# Patient Record
Sex: MALE | Race: WHITE | NOT HISPANIC OR LATINO
[De-identification: names, ages, dates, MRNs, and addresses within clinical notes are randomized per-mention and may not be internally consistent; named-entity substitution may affect disease eponyms.]

---

## 2018-05-17 ENCOUNTER — APPOINTMENT (OUTPATIENT)
Dept: ORTHOPEDIC SURGERY | Facility: CLINIC | Age: 53
End: 2018-05-17
Payer: COMMERCIAL

## 2018-05-17 VITALS
DIASTOLIC BLOOD PRESSURE: 96 MMHG | HEIGHT: 74 IN | HEART RATE: 87 BPM | SYSTOLIC BLOOD PRESSURE: 160 MMHG | WEIGHT: 310 LBS | BODY MASS INDEX: 39.78 KG/M2

## 2018-05-17 PROCEDURE — 99204 OFFICE O/P NEW MOD 45 MIN: CPT | Mod: 25

## 2018-05-17 PROCEDURE — 20552 NJX 1/MLT TRIGGER POINT 1/2: CPT

## 2018-05-17 PROCEDURE — 72100 X-RAY EXAM L-S SPINE 2/3 VWS: CPT

## 2018-05-17 RX ORDER — EPINEPHRINE 0.3 MG/.3ML
0.3 INJECTION INTRAMUSCULAR
Qty: 2 | Refills: 0 | Status: ACTIVE | COMMUNITY
Start: 2018-01-19

## 2018-06-07 ENCOUNTER — APPOINTMENT (OUTPATIENT)
Dept: ORTHOPEDIC SURGERY | Facility: CLINIC | Age: 53
End: 2018-06-07
Payer: COMMERCIAL

## 2018-06-07 VITALS
HEART RATE: 84 BPM | WEIGHT: 310 LBS | SYSTOLIC BLOOD PRESSURE: 149 MMHG | BODY MASS INDEX: 39.78 KG/M2 | DIASTOLIC BLOOD PRESSURE: 89 MMHG | HEIGHT: 74 IN

## 2018-06-07 PROCEDURE — 99214 OFFICE O/P EST MOD 30 MIN: CPT

## 2018-08-30 ENCOUNTER — OTHER (OUTPATIENT)
Age: 53
End: 2018-08-30

## 2018-09-18 ENCOUNTER — APPOINTMENT (OUTPATIENT)
Dept: ORTHOPEDIC SURGERY | Facility: CLINIC | Age: 53
End: 2018-09-18
Payer: COMMERCIAL

## 2018-09-18 VITALS
DIASTOLIC BLOOD PRESSURE: 90 MMHG | BODY MASS INDEX: 39.78 KG/M2 | WEIGHT: 310 LBS | SYSTOLIC BLOOD PRESSURE: 146 MMHG | HEIGHT: 74 IN | HEART RATE: 84 BPM

## 2018-09-18 DIAGNOSIS — Z78.9 OTHER SPECIFIED HEALTH STATUS: ICD-10-CM

## 2018-09-18 PROCEDURE — 96372 THER/PROPH/DIAG INJ SC/IM: CPT

## 2018-09-18 PROCEDURE — 99214 OFFICE O/P EST MOD 30 MIN: CPT | Mod: 25

## 2018-10-30 ENCOUNTER — APPOINTMENT (OUTPATIENT)
Dept: ORTHOPEDIC SURGERY | Facility: CLINIC | Age: 53
End: 2018-10-30
Payer: COMMERCIAL

## 2018-10-30 VITALS
HEIGHT: 74 IN | DIASTOLIC BLOOD PRESSURE: 93 MMHG | WEIGHT: 310 LBS | SYSTOLIC BLOOD PRESSURE: 163 MMHG | HEART RATE: 83 BPM | BODY MASS INDEX: 39.78 KG/M2

## 2018-10-30 PROCEDURE — 99214 OFFICE O/P EST MOD 30 MIN: CPT

## 2018-12-04 ENCOUNTER — OTHER (OUTPATIENT)
Age: 53
End: 2018-12-04

## 2019-01-04 ENCOUNTER — APPOINTMENT (OUTPATIENT)
Dept: ORTHOPEDIC SURGERY | Facility: CLINIC | Age: 54
End: 2019-01-04
Payer: COMMERCIAL

## 2019-01-04 VITALS
HEIGHT: 74 IN | HEART RATE: 79 BPM | DIASTOLIC BLOOD PRESSURE: 86 MMHG | SYSTOLIC BLOOD PRESSURE: 136 MMHG | WEIGHT: 310 LBS | BODY MASS INDEX: 39.78 KG/M2

## 2019-01-04 DIAGNOSIS — E66.9 OBESITY, UNSPECIFIED: ICD-10-CM

## 2019-01-04 DIAGNOSIS — M47.816 SPONDYLOSIS W/OUT MYELOPATHY OR RADICULOPATHY, LUMBAR REGION: ICD-10-CM

## 2019-01-04 DIAGNOSIS — Z86.79 PERSONAL HISTORY OF OTHER DISEASES OF THE CIRCULATORY SYSTEM: ICD-10-CM

## 2019-01-04 DIAGNOSIS — M60.9 MYOSITIS, UNSPECIFIED: ICD-10-CM

## 2019-01-04 PROCEDURE — 99214 OFFICE O/P EST MOD 30 MIN: CPT

## 2019-01-26 ENCOUNTER — EMERGENCY (EMERGENCY)
Facility: HOSPITAL | Age: 54
LOS: 1 days | Discharge: HOME | End: 2019-01-26
Attending: EMERGENCY MEDICINE
Payer: COMMERCIAL

## 2019-01-26 VITALS
RESPIRATION RATE: 18 BRPM | HEART RATE: 96 BPM | OXYGEN SATURATION: 99 % | SYSTOLIC BLOOD PRESSURE: 131 MMHG | DIASTOLIC BLOOD PRESSURE: 82 MMHG

## 2019-01-26 VITALS
RESPIRATION RATE: 22 BRPM | OXYGEN SATURATION: 99 % | TEMPERATURE: 98 F | SYSTOLIC BLOOD PRESSURE: 162 MMHG | DIASTOLIC BLOOD PRESSURE: 80 MMHG | HEART RATE: 114 BPM

## 2019-01-26 PROCEDURE — 99284 EMERGENCY DEPT VISIT MOD MDM: CPT

## 2019-01-26 PROCEDURE — 71045 X-RAY EXAM CHEST 1 VIEW: CPT

## 2019-01-26 PROCEDURE — 99285 EMERGENCY DEPT VISIT HI MDM: CPT

## 2019-01-26 PROCEDURE — 71045 X-RAY EXAM CHEST 1 VIEW: CPT | Mod: 26

## 2019-01-26 RX ORDER — SODIUM CHLORIDE 9 MG/ML
1000 INJECTION INTRAMUSCULAR; INTRAVENOUS; SUBCUTANEOUS ONCE
Qty: 0 | Refills: 0 | Status: COMPLETED | OUTPATIENT
Start: 2019-01-26 | End: 2019-01-26

## 2019-01-26 NOTE — ED PROVIDER NOTE - SKIN, MLM
Skin normal color for race, warm, dry and intact. No evidence of rash. NO scalp hematomas or external signs of trauma.

## 2019-01-26 NOTE — ED ADULT NURSE REASSESSMENT NOTE - NS ED NURSE REASSESS COMMENT FT1
received in room pt awake but sleepy denies complaints states wants to go home awaiting wife to arrive, pt informed of need to monitor due to receiving narcan verbalizes understanding. pt agrees to stay for observation, refusing blood and urine cardiac monitor in place 02 sat 97% on room air

## 2019-01-26 NOTE — ED ADULT NURSE REASSESSMENT NOTE - NS ED NURSE REASSESS COMMENT FT1
pt awake,  a and ox4 wants to leave, pts wife at bedside ok to take pt home. dr alonso aware. iv dced at pt request awaiting dc

## 2019-01-26 NOTE — ED PROVIDER NOTE - MEDICAL DECISION MAKING DETAILS
Pt presents after found unresponsive for EMS; pt reports drinking heavily x several days;  currently refusing blood work/imaging/; is alert and oriented; VSS:  will monitor.

## 2019-01-26 NOTE — ED ADULT NURSE NOTE - OBJECTIVE STATEMENT
assumed pt care at 1900 as critical care RN per ER protocol procedure.  Per EMS, pt was found unresponsive and cyanotic lying on the ground next to his car in a bar parking lot.  EMS gave 0.4 IV narcan.  Pt arrives awake, alert and oriented X 3.  Fall asleep inbetween answering questions.  Grunting at times and states "Im cold thast why im grunting."  Oxygen saturation 98%.  Dr. venegas at bedside.  Pt admits to drinking alcohol for the past two days.  He refused lab draw.  Dr. Venegas discussed importance of bloodwork. Pt takes oxycode at home for gout symptoms. No signs of trauma present. He denies pain.

## 2019-01-26 NOTE — ED ADULT TRIAGE NOTE - CHIEF COMPLAINT QUOTE
Patient BIBA, as per EMS patient found unconscious outside a bar, as per EMS patient was cyanotic, bagged and given 0.4mg narcan. patient barely responsive at this time, sent to critical care upon ED arrival

## 2019-01-26 NOTE — ED PROVIDER NOTE - PROGRESS NOTE DETAILS
PT alert/ oriented x 3; continues to refuse blood work and imaging. Pt sitting up on edge of bed with wife, awake and alert; I spoke to them together, with patient's permission to review events of today and concerns of EMS and US. I alerted pt that he could have electrolyte abnormalities/ dehydration due to heavy drinking x 3 days ( which he continues to endorse), and he states he will be sure to hydrate. Wife, who is a nurse, says she will watch him as well. Pt advised to return to ER immediately if he does not feel well or develops any concerning symptoms as our workup and intervention was minimal and he understands. Repeat VS stable. PT well appearing. No over current signs of intoxication. Will discharge home with wife. Pt sitting up on edge of bed with wife, awake and alert; I spoke to them together, with patient's permission to review events of today and concerns of EMS and US. I alerted pt that he could have electrolyte abnormalities/ dehydration due to heavy drinking x 3 days ( which he continues to endorse), and he states he will be sure to hydrate. Pt , again, admits to going on a drinking binge for the past three days due to the passing of his friend's wife and states he knows he drank too much.  Wife, who is a nurse, says she will watch him at home as well. Pt advised to return to ER immediately if he does not feel well or develops any concerning symptoms as our workup and intervention was minimal and he understands. Repeat VS stable. PT well appearing. No over current signs of intoxication. Will discharge home with wife.

## 2019-01-26 NOTE — ED PROVIDER NOTE - OBJECTIVE STATEMENT
53yoM found unresponsive on the ground outside a bar;  pt states he drank a lot; per EMS they arrived and pt was minimally responsive; they started bagging him and state that he had pinpoint pupils but started to come- to when they administered 0m4 mg Narcan;  pt admits to drinking; states that his friend's wife just passed away and he has been at a wake for several days;   in the field. Pt denies smoking/ drug use. Pt refusing blood work/ imaging stating he feels fine and doesn't need it. He states this only happened because he was drinking.     PCP Isaac. 53yoM found unresponsive on the ground outside a bar;  pt states he drank a lot; per EMS they arrived and pt was minimally responsive; they started bagging him and state that he had pinpoint pupils but started to come- to when they administered 0m4 mg Narcan;  pt admits to drinking; states that his friend's wife just passed away and he has been at a wake and drinking for several days straight;   in the field. Pt denies smoking/ drug use. Pt refusing blood work/ imaging stating he feels fine and doesn't need it. He states this only happened because he was drinking.     PCP Isaac. 53yoM found unresponsive on the ground outside a bar;  pt states he drank a lot; per EMS they arrived and pt was minimally responsive; they started bagging him and state that he had pinpoint pupils but started to come- to when they administered 0.4 mg Narcan;  pt admits to drinking; states that his friend's wife just passed away and he has been at a wake and drinking for several days straight;   in the field. Pt denies smoking/ drug use. Pt refusing blood work/ imaging stating he feels fine and doesn't need it. He states this only happened because he was drinking.     PCP Isaac.

## 2019-02-12 ENCOUNTER — OTHER (OUTPATIENT)
Age: 54
End: 2019-02-12

## 2021-03-09 PROBLEM — I10 ESSENTIAL (PRIMARY) HYPERTENSION: Chronic | Status: ACTIVE | Noted: 2019-01-26

## 2021-03-09 PROBLEM — M10.9 GOUT, UNSPECIFIED: Chronic | Status: ACTIVE | Noted: 2019-01-26

## 2021-03-15 ENCOUNTER — APPOINTMENT (OUTPATIENT)
Dept: SURGERY | Facility: CLINIC | Age: 56
End: 2021-03-15
Payer: MEDICARE

## 2021-03-15 VITALS
OXYGEN SATURATION: 95 % | SYSTOLIC BLOOD PRESSURE: 150 MMHG | WEIGHT: 315 LBS | BODY MASS INDEX: 42.2 KG/M2 | HEART RATE: 87 BPM | TEMPERATURE: 97.3 F | DIASTOLIC BLOOD PRESSURE: 87 MMHG | HEIGHT: 72.5 IN

## 2021-03-15 DIAGNOSIS — M54.9 DORSALGIA, UNSPECIFIED: ICD-10-CM

## 2021-03-15 DIAGNOSIS — E78.00 PURE HYPERCHOLESTEROLEMIA, UNSPECIFIED: ICD-10-CM

## 2021-03-15 DIAGNOSIS — I10 ESSENTIAL (PRIMARY) HYPERTENSION: ICD-10-CM

## 2021-03-15 DIAGNOSIS — M10.9 GOUT, UNSPECIFIED: ICD-10-CM

## 2021-03-15 PROCEDURE — 99204 OFFICE O/P NEW MOD 45 MIN: CPT

## 2021-03-15 RX ORDER — UBIDECARENONE/VIT E ACET 100MG-5
CAPSULE ORAL
Refills: 0 | Status: ACTIVE | COMMUNITY

## 2021-03-15 RX ORDER — MULTIVIT-MIN/IRON/FOLIC ACID/K 18-600-40
CAPSULE ORAL
Refills: 0 | Status: ACTIVE | COMMUNITY

## 2021-03-15 NOTE — HISTORY OF PRESENT ILLNESS
[de-identified] : LEONIDAS DALE is a 55 year old morbidly obese male (BMI=48), with history of prediabetes, AISHA on CPAP, high blood pressure, high cholesterol presents today to discuss his weight loss options. He states that he has tried numerous times to lose weight through more traditional medical means (diets including Slimfast, liquid protein, low calorie diet, Atkins, [place] & physician supervised diets) with limited success. He states that he loses approx. 15-20 lbs each attempt before plateauing and losing interest. He is looking for a more durable weight loss solution. He is interested in bariatric surgery in the hopes of eliminating these comorbid conditions and living a longer healthier life.  \par Denies tobacco use \par Denies GERD\par Family history of gastric cancer in multiple relatives

## 2021-03-15 NOTE — ASSESSMENT
[FreeTextEntry1] : 55M h/o HTN, HLD, AISHA, morbid obesity with BMI 48, here for initial visit for possible bariatric surgery. Based on current weight, comorbidities, and weight loss goals, would be excellent candidate for sleeve gastrectomy.

## 2021-03-15 NOTE — REVIEW OF SYSTEMS
[Joint Pain] : joint pain [Fever] : no fever [Chills] : no chills [Night Sweats] : no night sweats [Fatigue] : no fatigue [Eye Pain] : no eye pain [Red Eyes] : eyes not red [Dry Eyes] : no dryness of the eyes [Dysphagia] : no dysphagia [Loss of Hearing] : no loss of hearing [Nosebleeds] : no nosebleeds [Odynophagia] : no odynophagia [Mucosal Pain] : no mucosal pain [Chest Pain] : no chest pain [Palpitations] : no palpitations [Shortness Of Breath] : no shortness of breath [Wheezing] : no wheezing [Abdominal Pain] : no abdominal pain [Vomiting] : no vomiting [Constipation] : no constipation [Diarrhea] : no diarrhea [Reflux/Heartburn] : no reflex/heartburn [Hernia] : no hernia [Dysuria] : no dysuria [Incontinence] : no incontinence [Joint Stiffness] : no joint stiffness [Muscle Pain] : no muscle pain [Skin Rash] : no skin rash [Skin Wound] : no skin wound [Confused] : no confusion [Dizziness] : no dizziness [Fainting] : no fainting [Suicidal] : not suicidal [Insomnia] : no insomnia [Anxiety] : no anxiety [Proptosis] : no proptosis [Hot Flashes] : no hot flashes [Muscle Weakness] : no muscle weakness [Easy Bleeding] : no tendency for easy bleeding [Easy Bruising] : no tendency for easy bruising [Swollen Glands] : no swollen glands

## 2021-03-15 NOTE — PLAN
[FreeTextEntry1] : Bariatric labs\par Dietician/Psych\par UGI/RUQ Sono\par EGD/GI eval for H.pylori\par 3 months medically supervised wt loss\par Cards/Pulm clearance.

## 2021-03-18 DIAGNOSIS — R73.03 PREDIABETES.: ICD-10-CM

## 2021-03-18 LAB
25(OH)D3 SERPL-MCNC: 48.6 NG/ML
ALBUMIN SERPL ELPH-MCNC: 4.2 G/DL
ALP BLD-CCNC: 109 U/L
ALT SERPL-CCNC: 29 U/L
ANION GAP SERPL CALC-SCNC: 11 MMOL/L
APPEARANCE: CLEAR
APTT BLD: 32.2 SEC
AST SERPL-CCNC: 22 U/L
BACTERIA: NEGATIVE
BASOPHILS # BLD AUTO: 0.07 K/UL
BASOPHILS NFR BLD AUTO: 1.1 %
BILIRUB SERPL-MCNC: 0.3 MG/DL
BILIRUBIN URINE: NEGATIVE
BLOOD URINE: NEGATIVE
BUN SERPL-MCNC: 21 MG/DL
CALCIUM SERPL-MCNC: 9.7 MG/DL
CALCIUM SERPL-MCNC: 9.7 MG/DL
CHLORIDE SERPL-SCNC: 99 MMOL/L
CHOLEST SERPL-MCNC: 189 MG/DL
CO2 SERPL-SCNC: 32 MMOL/L
COLOR: NORMAL
CREAT SERPL-MCNC: 0.91 MG/DL
EOSINOPHIL # BLD AUTO: 0.27 K/UL
EOSINOPHIL NFR BLD AUTO: 4.3 %
ESTIMATED AVERAGE GLUCOSE: 151 MG/DL
FOLATE SERPL-MCNC: >20 NG/ML
GLUCOSE QUALITATIVE U: NEGATIVE
GLUCOSE SERPL-MCNC: 114 MG/DL
HBA1C MFR BLD HPLC: 6.9 %
HCT VFR BLD CALC: 47 %
HDLC SERPL-MCNC: 47 MG/DL
HGB BLD-MCNC: 15.4 G/DL
HYALINE CASTS: 0 /LPF
IMM GRANULOCYTES NFR BLD AUTO: 0.2 %
INR PPP: 0.97 RATIO
IRON SATN MFR SERPL: 51 %
IRON SERPL-MCNC: 157 UG/DL
KETONES URINE: NEGATIVE
LDLC SERPL CALC-MCNC: 102 MG/DL
LEUKOCYTE ESTERASE URINE: NEGATIVE
LYMPHOCYTES # BLD AUTO: 2.12 K/UL
LYMPHOCYTES NFR BLD AUTO: 33.8 %
MAN DIFF?: NORMAL
MCHC RBC-ENTMCNC: 30.5 PG
MCHC RBC-ENTMCNC: 32.8 GM/DL
MCV RBC AUTO: 93.1 FL
MICROSCOPIC-UA: NORMAL
MONOCYTES # BLD AUTO: 0.57 K/UL
MONOCYTES NFR BLD AUTO: 9.1 %
NEUTROPHILS # BLD AUTO: 3.24 K/UL
NEUTROPHILS NFR BLD AUTO: 51.5 %
NITRITE URINE: NEGATIVE
NONHDLC SERPL-MCNC: 142 MG/DL
PARATHYROID HORMONE INTACT: 40 PG/ML
PH URINE: 7
PLATELET # BLD AUTO: 222 K/UL
POTASSIUM SERPL-SCNC: 4.5 MMOL/L
PREALB SERPL NEPH-MCNC: 28 MG/DL
PROT SERPL-MCNC: 6.7 G/DL
PROTEIN URINE: NEGATIVE
PT BLD: 11.6 SEC
RBC # BLD: 5.05 M/UL
RBC # FLD: 12.7 %
RED BLOOD CELLS URINE: 3 /HPF
SODIUM SERPL-SCNC: 142 MMOL/L
SPECIFIC GRAVITY URINE: 1.02
SQUAMOUS EPITHELIAL CELLS: 0 /HPF
TIBC SERPL-MCNC: 307 UG/DL
TRIGL SERPL-MCNC: 198 MG/DL
TSH SERPL-ACNC: 1.56 UIU/ML
UIBC SERPL-MCNC: 151 UG/DL
UROBILINOGEN URINE: NORMAL
VIT B12 SERPL-MCNC: 694 PG/ML
WBC # FLD AUTO: 6.28 K/UL
WHITE BLOOD CELLS URINE: 0 /HPF

## 2021-03-19 ENCOUNTER — NON-APPOINTMENT (OUTPATIENT)
Age: 56
End: 2021-03-19

## 2021-03-19 LAB
H PYLORI AB SER-ACNC: <5 UNITS
H PYLORI IGA SER-ACNC: 32.7 UNITS

## 2021-03-22 LAB — ZINC SERPL-MCNC: 145 UG/DL

## 2021-03-23 LAB
A-TOCOPHEROL VIT E SERPL-MCNC: 9 MG/L
BETA+GAMMA TOCOPHEROL SERPL-MCNC: 2.1 MG/L
VIT A SERPL-MCNC: 46.3 UG/DL

## 2021-03-25 ENCOUNTER — NON-APPOINTMENT (OUTPATIENT)
Age: 56
End: 2021-03-25

## 2021-03-25 ENCOUNTER — OUTPATIENT (OUTPATIENT)
Dept: OUTPATIENT SERVICES | Facility: HOSPITAL | Age: 56
LOS: 1 days | End: 2021-03-25
Payer: MEDICARE

## 2021-03-25 DIAGNOSIS — Z01.818 ENCOUNTER FOR OTHER PREPROCEDURAL EXAMINATION: ICD-10-CM

## 2021-03-25 LAB — VIT B1 SERPL-MCNC: 182.6 NMOL/L

## 2021-03-25 PROCEDURE — 74246 X-RAY XM UPR GI TRC 2CNTRST: CPT | Mod: 26

## 2021-03-25 PROCEDURE — 76700 US EXAM ABDOM COMPLETE: CPT

## 2021-03-25 PROCEDURE — 74246 X-RAY XM UPR GI TRC 2CNTRST: CPT

## 2021-03-25 PROCEDURE — 76700 US EXAM ABDOM COMPLETE: CPT | Mod: 26

## 2021-03-30 ENCOUNTER — APPOINTMENT (OUTPATIENT)
Dept: PULMONOLOGY | Facility: CLINIC | Age: 56
End: 2021-03-30
Payer: MEDICARE

## 2021-03-30 VITALS
HEART RATE: 72 BPM | OXYGEN SATURATION: 96 % | TEMPERATURE: 97 F | SYSTOLIC BLOOD PRESSURE: 140 MMHG | HEIGHT: 72.5 IN | DIASTOLIC BLOOD PRESSURE: 86 MMHG | BODY MASS INDEX: 42.2 KG/M2 | RESPIRATION RATE: 16 BRPM | WEIGHT: 315 LBS

## 2021-03-30 DIAGNOSIS — Z82.49 FAMILY HISTORY OF ISCHEMIC HEART DISEASE AND OTHER DISEASES OF THE CIRCULATORY SYSTEM: ICD-10-CM

## 2021-03-30 DIAGNOSIS — E11.9 TYPE 2 DIABETES MELLITUS W/OUT COMPLICATIONS: ICD-10-CM

## 2021-03-30 DIAGNOSIS — M06.9 RHEUMATOID ARTHRITIS, UNSPECIFIED: ICD-10-CM

## 2021-03-30 PROCEDURE — 99204 OFFICE O/P NEW MOD 45 MIN: CPT | Mod: CS

## 2021-03-30 RX ORDER — DOXYCYCLINE HYCLATE 100 MG/1
100 TABLET ORAL
Qty: 20 | Refills: 0 | Status: DISCONTINUED | COMMUNITY
Start: 2021-01-22 | End: 2021-03-30

## 2021-03-30 RX ORDER — DIAZEPAM 5 MG/1
5 TABLET ORAL
Qty: 2 | Refills: 0 | Status: DISCONTINUED | COMMUNITY
Start: 2018-01-11 | End: 2021-03-30

## 2021-03-30 RX ORDER — AZITHROMYCIN 250 MG/1
250 TABLET, FILM COATED ORAL
Qty: 6 | Refills: 0 | Status: DISCONTINUED | COMMUNITY
Start: 2020-10-31 | End: 2021-03-30

## 2021-03-30 RX ORDER — TIZANIDINE HYDROCHLORIDE 4 MG/1
4 CAPSULE ORAL
Qty: 45 | Refills: 0 | Status: DISCONTINUED | COMMUNITY
Start: 2020-10-26 | End: 2021-03-30

## 2021-03-30 RX ORDER — KETOROLAC TROMETHAMINE 10 MG/1
10 TABLET, FILM COATED ORAL 3 TIMES DAILY
Qty: 15 | Refills: 0 | Status: DISCONTINUED | COMMUNITY
Start: 2018-09-18 | End: 2021-03-30

## 2021-03-30 RX ORDER — PREDNISONE 20 MG/1
20 TABLET ORAL
Qty: 5 | Refills: 0 | Status: ACTIVE | COMMUNITY
Start: 2020-11-04

## 2021-03-30 RX ORDER — METHYLPREDNISOLONE 4 MG/1
4 TABLET ORAL
Qty: 1 | Refills: 0 | Status: DISCONTINUED | COMMUNITY
Start: 2018-09-18 | End: 2021-03-30

## 2021-03-30 RX ORDER — OLMESARTAN MEDOXOMIL 40 MG/1
TABLET, FILM COATED ORAL
Refills: 0 | Status: DISCONTINUED | COMMUNITY
End: 2021-03-30

## 2021-03-30 RX ORDER — METHYLPREDNISOLONE 4 MG/1
4 TABLET ORAL
Qty: 1 | Refills: 0 | Status: DISCONTINUED | COMMUNITY
Start: 2019-01-04 | End: 2021-03-30

## 2021-03-30 RX ORDER — CYCLOBENZAPRINE HYDROCHLORIDE 10 MG/1
10 TABLET, FILM COATED ORAL
Qty: 30 | Refills: 0 | Status: DISCONTINUED | COMMUNITY
Start: 2020-11-04 | End: 2021-03-30

## 2021-03-30 RX ORDER — ZOLPIDEM TARTRATE 10 MG/1
10 TABLET ORAL
Qty: 30 | Refills: 0 | Status: DISCONTINUED | COMMUNITY
Start: 2020-12-14 | End: 2021-03-30

## 2021-03-30 RX ORDER — METHYLPREDNISOLONE 4 MG/1
4 TABLET ORAL
Qty: 1 | Refills: 1 | Status: DISCONTINUED | COMMUNITY
Start: 2018-05-17 | End: 2021-03-30

## 2021-03-30 RX ORDER — FLUTICASONE PROPIONATE 50 UG/1
50 SPRAY, METERED NASAL
Qty: 16 | Refills: 0 | Status: ACTIVE | COMMUNITY
Start: 2021-01-22

## 2021-03-30 RX ORDER — NAPROXEN 500 MG/1
500 TABLET ORAL
Qty: 60 | Refills: 0 | Status: DISCONTINUED | COMMUNITY
Start: 2018-04-13 | End: 2021-03-30

## 2021-03-30 RX ORDER — SEMAGLUTIDE 1.34 MG/ML
2 INJECTION, SOLUTION SUBCUTANEOUS
Qty: 3 | Refills: 0 | Status: ACTIVE | COMMUNITY
Start: 2021-03-20

## 2021-03-30 RX ORDER — TOBRAMYCIN AND DEXAMETHASONE 3; 1 MG/G; MG/G
0.3-0.1 OINTMENT OPHTHALMIC
Qty: 4 | Refills: 0 | Status: DISCONTINUED | COMMUNITY
Start: 2020-10-15 | End: 2021-03-30

## 2021-03-30 RX ORDER — KETOROLAC TROMETHAMINE 10 MG/1
10 TABLET, FILM COATED ORAL 3 TIMES DAILY
Qty: 15 | Refills: 0 | Status: DISCONTINUED | COMMUNITY
Start: 2018-05-17 | End: 2021-03-30

## 2021-03-30 NOTE — PHYSICAL EXAM
[No Acute Distress] : no acute distress [Well Nourished] : well nourished [Well Developed] : well developed [Low Lying Soft Palate] : low lying soft palate [Enlarged Base of the Tongue] : enlarged base of the tongue [IV] : Mallampati Class: IV [Supple] : supple [Normal Appearance] : normal appearance [Normal Rate/Rhythm] : normal rate/rhythm [Murmur ___ / 6] : murmur [unfilled] / 6 [Normal S1, S2] : normal s1, s2 [No Resp Distress] : no resp distress [No Acc Muscle Use] : no acc muscle use [Normal Rhythm and Effort] : normal rhythm and effort [Clear to Auscultation Bilaterally] : clear to auscultation bilaterally [No Abnormalities] : no abnormalities [Benign] : benign [Not Tender] : not tender [Soft] : soft [Normal Gait] : normal gait [No Clubbing] : no clubbing [No Edema] : no edema [No Focal Deficits] : no focal deficits [Oriented x3] : oriented x3 [TextBox_2] : Morbidly obese

## 2021-03-30 NOTE — DISCUSSION/SUMMARY
[FreeTextEntry1] : \par #1. Will schedule PFTs in near future to assess lung function with Covid testing prior to PFTs\par #2. The patient does not appear to require chronic BD therapy at this time\par #3. Diet and exercise for weight loss\par #4. SOBOE is likely related to weight or deconditioning\par #5. Pt is currently on CPAP therapy but reports residual EDS and is having trouble sleeping with it; will obtain bilevel titration study; alternative could try to change pressures as 4-8 cm of water may be too low\par #6. F/u with cardiology regarding murmur\par #7. F/u in 4-6 weeks with compliance\par #8. Reviewed risks of exposure and symptoms of Covid-19 virus, including how the virus is spread and precautions to avoid kenia virus.\par \par Patient's questions were answered and patient appears to understand these recommendations

## 2021-03-30 NOTE — CONSULT LETTER
[Dear  ___] : Dear  [unfilled], [Consult Letter:] : I had the pleasure of evaluating your patient, [unfilled]. [Please see my note below.] : Please see my note below. [Consult Closing:] : Thank you very much for allowing me to participate in the care of this patient.  If you have any questions, please do not hesitate to contact me. [Sincerely,] : Sincerely, [FreeTextEntry3] : Usman Vallecillo MD, FCCP, D. ABSM\par Pulmonary and Sleep Medicine\par [facility] at [place]

## 2021-03-30 NOTE — REVIEW OF SYSTEMS
[SOB on Exertion] : sob on exertion [Seasonal Allergies] : seasonal allergies [Back Pain] : back pain [Diabetes] : diabetes [Fever] : no fever [Chills] : no chills [Nasal Congestion] : no nasal congestion [Postnasal Drip] : no postnasal drip [Sinus Problems] : no sinus problems [Cough] : no cough [Chest Tightness] : no chest tightness [Sputum] : no sputum [Dyspnea] : no dyspnea [Pleuritic Pain] : no pleuritic pain [Wheezing] : no wheezing [Chest Discomfort] : no chest discomfort [Edema] : no edema [Palpitations] : no palpitations [Syncope] : no syncope [Hay Fever] : no hay fever [GERD] : no gerd [Abdominal Pain] : no abdominal pain [Nausea] : no nausea [Vomiting] : no vomiting [Constipation] : no constipation [Diarrhea] : no diarrhea [Anemia] : no anemia [Headache] : no headache [Seizures] : no seizures [Dizziness] : no dizziness [Numbness] : no numbness [Paralysis] : no paralysis [Confusion] : no confusion [Depression] : no depression [Anxiety] : no anxiety [Thyroid Problem] : no thyroid problem

## 2021-04-13 LAB — MENADIONE SERPL-MCNC: 1.37 NMOL/L

## 2021-04-19 ENCOUNTER — APPOINTMENT (OUTPATIENT)
Dept: GASTROENTEROLOGY | Facility: CLINIC | Age: 56
End: 2021-04-19
Payer: MEDICARE

## 2021-04-19 VITALS
SYSTOLIC BLOOD PRESSURE: 135 MMHG | WEIGHT: 315 LBS | DIASTOLIC BLOOD PRESSURE: 95 MMHG | BODY MASS INDEX: 42.2 KG/M2 | TEMPERATURE: 95.9 F | HEIGHT: 72.5 IN | OXYGEN SATURATION: 97 % | RESPIRATION RATE: 14 BRPM | HEART RATE: 90 BPM

## 2021-04-19 DIAGNOSIS — Z12.11 ENCOUNTER FOR SCREENING FOR MALIGNANT NEOPLASM OF COLON: ICD-10-CM

## 2021-04-19 PROCEDURE — 99204 OFFICE O/P NEW MOD 45 MIN: CPT

## 2021-04-19 RX ORDER — ROSUVASTATIN CALCIUM 10 MG/1
10 TABLET, FILM COATED ORAL
Qty: 90 | Refills: 0 | Status: DISCONTINUED | COMMUNITY
Start: 2021-03-20 | End: 2021-04-19

## 2021-04-19 NOTE — HISTORY OF PRESENT ILLNESS
[Heartburn] : denies heartburn [Nausea] : denies nausea [Vomiting] : denies vomiting [Diarrhea] : denies diarrhea [Constipation] : denies constipation [Yellow Skin Or Eyes (Jaundice)] : denies jaundice [Abdominal Pain] : denies abdominal pain [Abdominal Swelling] : denies abdominal swelling [Rectal Pain] : denies rectal pain [Test: ___] : [unfilled] [_________] : Performed [unfilled] [de-identified] : LEONIDAS DALE is a 55 year old male presenting today for clearance prior to bariatric surgery. He will be having the vertical sleeve gastrectomy with Dr. Allen. His current weight is 334 lbs and BMI is 44.68. His highest weight was about 360 lbs. He has lost about 30 lbs in the last month through strict diet and exercise. He denies any abdominal pain, nausea, vomiting, acid reflux, dysphagia or odynophagia. He reports a strong family history of gastric cancer on his dads side due to a genetic mutation. His paternal grandmother, 2 paternal aunts, and a paternal uncle all had gastric cancer. He states that 13 other family members including aunts, uncles, and cousins had radical gastrectomies due to the genetic mutation. His father did not have the gene. \par There is a family history of colon polyps in his father. He denies any family history of colon cancer. He denies abdominal pain, constipation, diarrhea, black or bloody stools. He moves his bowels regularly. \par Medical history includes HTN, diabetes, hypercholesterolemia, gout, and fatty liver.  [de-identified] : hepatic steatosis [de-identified] : normal study

## 2021-04-19 NOTE — PHYSICAL EXAM
[General Appearance - Alert] : alert [General Appearance - In No Acute Distress] : in no acute distress [Sclera] : the sclera and conjunctiva were normal [PERRL With Normal Accommodation] : pupils were equal in size, round, and reactive to light [Extraocular Movements] : extraocular movements were intact [Outer Ear] : the ears and nose were normal in appearance [Oropharynx] : the oropharynx was normal [Neck Appearance] : the appearance of the neck was normal [Neck Cervical Mass (___cm)] : no neck mass was observed [Jugular Venous Distention Increased] : there was no jugular-venous distention [Thyroid Diffuse Enlargement] : the thyroid was not enlarged [Thyroid Nodule] : there were no palpable thyroid nodules [Auscultation Breath Sounds / Voice Sounds] : lungs were clear to auscultation bilaterally [Heart Rate And Rhythm] : heart rate was normal and rhythm regular [Heart Sounds] : normal S1 and S2 [Heart Sounds Gallop] : no gallops [Murmurs] : no murmurs [Heart Sounds Pericardial Friction Rub] : no pericardial rub [Bowel Sounds] : normal bowel sounds [Abdomen Soft] : soft [Abdomen Tenderness] : non-tender [Abdomen Mass (___ Cm)] : no abdominal mass palpated [Abnormal Walk] : normal gait [Nail Clubbing] : no clubbing  or cyanosis of the fingernails [Musculoskeletal - Swelling] : no joint swelling seen [Skin Color & Pigmentation] : normal skin color and pigmentation [Motor Tone] : muscle strength and tone were normal [Skin Turgor] : normal skin turgor [] : no rash [Oriented To Time, Place, And Person] : oriented to person, place, and time [Impaired Insight] : insight and judgment were intact [Affect] : the affect was normal [Edema] : there was no peripheral edema [FreeTextEntry1] : obese

## 2021-04-19 NOTE — ASSESSMENT
[FreeTextEntry1] : The patient presents today for GI clearance prior to bariatric surgery and colon cancer screening. He will be scheduled for both an endoscopy and a colonoscopy with Suprep at [facility]. I have discussed the indications, benefits, risks (including but not limited to reaction to the anesthesia, infection, bleeding, and perforation),  and alternatives to an endoscopy and colonoscopy with the patient. He understands all options and has agreed to have both procedures. He is medically optimized.

## 2021-04-19 NOTE — ADDENDUM
[FreeTextEntry1] : I, Johanna Gatica NP, acted as scribe for Dr. VENU York for this patient encounter

## 2021-04-19 NOTE — REASON FOR VISIT
[Initial Evaluation] : an initial evaluation [FreeTextEntry1] : clearance for bariatric surgery, colonoscopy consult, father with colon polyps

## 2021-04-27 ENCOUNTER — APPOINTMENT (OUTPATIENT)
Dept: DISASTER EMERGENCY | Facility: CLINIC | Age: 56
End: 2021-04-27

## 2021-04-28 LAB — SARS-COV-2 N GENE NPH QL NAA+PROBE: NOT DETECTED

## 2021-04-30 ENCOUNTER — APPOINTMENT (OUTPATIENT)
Dept: PULMONOLOGY | Facility: CLINIC | Age: 56
End: 2021-04-30
Payer: MEDICARE

## 2021-04-30 VITALS — TEMPERATURE: 98 F | HEIGHT: 72 IN | BODY MASS INDEX: 42.66 KG/M2 | WEIGHT: 315 LBS

## 2021-04-30 VITALS
OXYGEN SATURATION: 95 % | HEART RATE: 84 BPM | RESPIRATION RATE: 16 BRPM | SYSTOLIC BLOOD PRESSURE: 122 MMHG | DIASTOLIC BLOOD PRESSURE: 78 MMHG

## 2021-04-30 DIAGNOSIS — U07.1 COVID-19: ICD-10-CM

## 2021-04-30 DIAGNOSIS — G47.33 OBSTRUCTIVE SLEEP APNEA (ADULT) (PEDIATRIC): ICD-10-CM

## 2021-04-30 DIAGNOSIS — Z99.89 OBSTRUCTIVE SLEEP APNEA (ADULT) (PEDIATRIC): ICD-10-CM

## 2021-04-30 DIAGNOSIS — Z01.818 ENCOUNTER FOR OTHER PREPROCEDURAL EXAMINATION: ICD-10-CM

## 2021-04-30 PROCEDURE — 94729 DIFFUSING CAPACITY: CPT

## 2021-04-30 PROCEDURE — 85018 HEMOGLOBIN: CPT | Mod: QW

## 2021-04-30 PROCEDURE — 99214 OFFICE O/P EST MOD 30 MIN: CPT | Mod: CS,25

## 2021-04-30 PROCEDURE — 94727 GAS DIL/WSHOT DETER LNG VOL: CPT

## 2021-04-30 PROCEDURE — 94010 BREATHING CAPACITY TEST: CPT

## 2021-04-30 NOTE — DISCUSSION/SUMMARY
[FreeTextEntry1] : \par #1. PFTs performed today are essentially normal with mildly reduced lung volumes\par #2. The patient does not appear to require chronic BD therapy at this time\par #3. SOBOE is likely related to weight or deconditioning given normal PFTs\par #4. Diet and exercise for weight loss \par #5. Pt is currently on CPAP therapy and reports improvedl EDS and reports compliance despite compliance report; will change pressure to 8-12 cm of water given residual mild AISHA with an AHI of 11.8; if still no improvement, could consider bilevel therapy but would expect further improvement with weight loss\par #6. F/u with cardiology regarding murmur\par #7. F/u in 2 months with compliance and CXR\par #8. Further pre-op recommendations will be forthcoming pending the above w/u \par #9. Reviewed risks of exposure and symptoms of Covid-19 virus, including how the virus is spread and precautions to avoid kenia virus.\par \par Patient's questions were answered and patient appears to understand these recommendations

## 2021-04-30 NOTE — HISTORY OF PRESENT ILLNESS
[Never] : never [None] : No associated symptoms are reported [Fair Symptom Control] : fair symptom control [Follow-Up - Routine Clinic] : a routine clinic follow-up of [Excess Weight] : excess weight [Currently Experiencing] : The patient is currently experiencing symptoms. [Dyspnea] : dyspnea [Knee Pain] : knee pain [Back Pain] : back pain [Low Calorie Diet] : low calorie diet [Fair Tolerance] : fair tolerance of treatment [Poor Symptom Control] : poor symptom control [Dyslipidemia] : dyslipidemia [Sleep Apnea] : sleep apnea [Diabetes] : diabetes [Hypertension] : hypertension [High] : high [Low Calorie] : low calorie [Well Balanced Diet] : well balanced meals [Infrequently] : exercises infrequently [TextBox_4] : Patient c/o SOBOE but is otherwise without associated respiratory complaints.\par He reports seasonal allergies.\par Pt presents for evaluation prior to his upcoming gastric sleeve surgery.\par  [Fair Compliance] : fair compliance with treatment [de-identified] : autoCPAP 4-8 cm of water

## 2021-04-30 NOTE — CONSULT LETTER
[Dear  ___] : Dear  [unfilled], [Consult Letter:] : I had the pleasure of evaluating your patient, [unfilled]. [Please see my note below.] : Please see my note below. [Consult Closing:] : Thank you very much for allowing me to participate in the care of this patient.  If you have any questions, please do not hesitate to contact me. [Sincerely,] : Sincerely, [FreeTextEntry3] : Usman Vallecillo MD, FCCP, D. ABSM\par Pulmonary and Sleep Medicine\par [facility] at [place]

## 2021-04-30 NOTE — PHYSICAL EXAM
[No Acute Distress] : no acute distress [Well Nourished] : well nourished [Well Developed] : well developed [Low Lying Soft Palate] : low lying soft palate [Enlarged Base of the Tongue] : enlarged base of the tongue [IV] : Mallampati Class: IV [Normal Appearance] : normal appearance [Supple] : supple [Normal Rate/Rhythm] : normal rate/rhythm [Murmur ___ / 6] : murmur [unfilled] / 6 [Normal S1, S2] : normal s1, s2 [No Resp Distress] : no resp distress [No Acc Muscle Use] : no acc muscle use [Normal Rhythm and Effort] : normal rhythm and effort [Clear to Auscultation Bilaterally] : clear to auscultation bilaterally [No Abnormalities] : no abnormalities [Benign] : benign [Not Tender] : not tender [Soft] : soft [Normal Gait] : normal gait [No Clubbing] : no clubbing [No Edema] : no edema [No Focal Deficits] : no focal deficits [Oriented x3] : oriented x3 [TextBox_2] : Morbidly obese

## 2021-04-30 NOTE — REVIEW OF SYSTEMS
[SOB on Exertion] : sob on exertion [Seasonal Allergies] : seasonal allergies [Back Pain] : back pain [Diabetes] : diabetes [Fever] : no fever [Chills] : no chills [Nasal Congestion] : no nasal congestion [Postnasal Drip] : no postnasal drip [Sinus Problems] : no sinus problems [Cough] : no cough [Chest Tightness] : no chest tightness [Sputum] : no sputum [Dyspnea] : no dyspnea [Pleuritic Pain] : no pleuritic pain [Wheezing] : no wheezing [Chest Discomfort] : no chest discomfort [Edema] : no edema [Palpitations] : no palpitations [Syncope] : no syncope [Hay Fever] : no hay fever [GERD] : no gerd [Abdominal Pain] : no abdominal pain [Nausea] : no nausea [Vomiting] : no vomiting [Diarrhea] : no diarrhea [Constipation] : no constipation [Anemia] : no anemia [Headache] : no headache [Seizures] : no seizures [Dizziness] : no dizziness [Numbness] : no numbness [Paralysis] : no paralysis [Confusion] : no confusion [Depression] : no depression [Anxiety] : no anxiety [Thyroid Problem] : no thyroid problem

## 2021-04-30 NOTE — REASON FOR VISIT
[Follow-Up] : a follow-up visit [Sleep Apnea] : sleep apnea [Shortness of Breath] : shortness of breath [Obesity] : obesity

## 2021-07-07 ENCOUNTER — APPOINTMENT (OUTPATIENT)
Dept: PULMONOLOGY | Facility: CLINIC | Age: 56
End: 2021-07-07

## 2021-07-17 ENCOUNTER — APPOINTMENT (OUTPATIENT)
Dept: DISASTER EMERGENCY | Facility: CLINIC | Age: 56
End: 2021-07-17

## 2021-07-18 LAB — SARS-COV-2 N GENE NPH QL NAA+PROBE: NOT DETECTED

## 2021-07-19 ENCOUNTER — TRANSCRIPTION ENCOUNTER (OUTPATIENT)
Age: 56
End: 2021-07-19

## 2021-07-20 ENCOUNTER — OUTPATIENT (OUTPATIENT)
Dept: OUTPATIENT SERVICES | Facility: HOSPITAL | Age: 56
LOS: 1 days | End: 2021-07-20
Payer: MEDICARE

## 2021-07-20 ENCOUNTER — RESULT REVIEW (OUTPATIENT)
Age: 56
End: 2021-07-20

## 2021-07-20 ENCOUNTER — APPOINTMENT (OUTPATIENT)
Dept: GASTROENTEROLOGY | Facility: HOSPITAL | Age: 56
End: 2021-07-20

## 2021-07-20 DIAGNOSIS — Z12.11 ENCOUNTER FOR SCREENING FOR MALIGNANT NEOPLASM OF COLON: ICD-10-CM

## 2021-07-20 DIAGNOSIS — Z80.0 FAMILY HISTORY OF MALIGNANT NEOPLASM OF DIGESTIVE ORGANS: ICD-10-CM

## 2021-07-20 PROCEDURE — 88305 TISSUE EXAM BY PATHOLOGIST: CPT | Mod: 26

## 2021-07-20 PROCEDURE — 45385 COLONOSCOPY W/LESION REMOVAL: CPT | Mod: PT

## 2021-07-20 PROCEDURE — C1889: CPT

## 2021-07-20 PROCEDURE — 43239 EGD BIOPSY SINGLE/MULTIPLE: CPT

## 2021-07-20 PROCEDURE — 45385 COLONOSCOPY W/LESION REMOVAL: CPT

## 2021-07-20 PROCEDURE — 88342 IMHCHEM/IMCYTCHM 1ST ANTB: CPT

## 2021-07-20 PROCEDURE — 88305 TISSUE EXAM BY PATHOLOGIST: CPT

## 2021-07-20 PROCEDURE — 43239 EGD BIOPSY SINGLE/MULTIPLE: CPT | Mod: 59

## 2021-07-20 PROCEDURE — 88342 IMHCHEM/IMCYTCHM 1ST ANTB: CPT | Mod: 26

## 2021-07-20 RX ORDER — PANTOPRAZOLE 40 MG/1
40 TABLET, DELAYED RELEASE ORAL
Qty: 1 | Refills: 3 | Status: ACTIVE | COMMUNITY
Start: 2021-07-20 | End: 1900-01-01

## 2021-07-22 LAB — SURGICAL PATHOLOGY STUDY: SIGNIFICANT CHANGE UP

## 2021-07-23 DIAGNOSIS — K22.70 BARRETT'S ESOPHAGUS W/OUT DYSPLASIA: ICD-10-CM

## 2021-07-27 ENCOUNTER — NON-APPOINTMENT (OUTPATIENT)
Age: 56
End: 2021-07-27

## 2021-08-23 ENCOUNTER — APPOINTMENT (OUTPATIENT)
Dept: SURGERY | Facility: CLINIC | Age: 56
End: 2021-08-23
Payer: MEDICARE

## 2021-08-23 VITALS
SYSTOLIC BLOOD PRESSURE: 147 MMHG | DIASTOLIC BLOOD PRESSURE: 92 MMHG | WEIGHT: 315 LBS | HEART RATE: 95 BPM | OXYGEN SATURATION: 93 % | BODY MASS INDEX: 42.66 KG/M2 | HEIGHT: 72 IN | TEMPERATURE: 97.2 F

## 2021-08-23 PROCEDURE — 99213 OFFICE O/P EST LOW 20 MIN: CPT

## 2021-08-23 NOTE — ASSESSMENT
[FreeTextEntry1] : Based on the recent EGD findings, I would recommend gastric bypass as the procedure of choice, which the patient is nervous about. I counseled the patient that a sleeve may make his reflux worse, thus worsening his Lamas's, whereas a bypass will improve his relfux. He would like to consider his options and will call the office.

## 2021-08-23 NOTE — REVIEW OF SYSTEMS
[Fever] : no fever [Chills] : no chills [Eye Pain] : no eye pain [Red Eyes] : eyes not red [Dysphagia] : no dysphagia [Loss of Hearing] : no loss of hearing [Odynophagia] : no odynophagia [Mucosal Pain] : no mucosal pain [Chest Pain] : no chest pain [Palpitations] : no palpitations [Shortness Of Breath] : no shortness of breath [Wheezing] : no wheezing [Abdominal Pain] : no abdominal pain [Vomiting] : no vomiting [Reflux/Heartburn] : no reflex/heartburn [Hernia] : no hernia [Dysuria] : no dysuria [Incontinence] : no incontinence [Joint Pain] : no joint pain [Joint Stiffness] : no joint stiffness [Skin Rash] : no skin rash [Skin Wound] : no skin wound [Confused] : no confusion [Dizziness] : no dizziness [Suicidal] : not suicidal [Insomnia] : no insomnia [Proptosis] : no proptosis [Hot Flashes] : no hot flashes [Easy Bleeding] : no tendency for easy bleeding [Easy Bruising] : no tendency for easy bruising

## 2021-08-23 NOTE — HISTORY OF PRESENT ILLNESS
[de-identified] : LEONIDAS DALE is a 55 year old morbidly obese male (BMI=48), with history of prediabetes, AISHA on CPAP, high blood pressure, high cholesterol presents today to discuss his weight loss options. He states that he has tried numerous times to lose weight through more traditional medical means (diets including ***) with limited success. He states that he  loses approx. *** lbs each attempt before plateauing and losing interest. He is looking for a more durable weight loss solution. He is interested in bariatric surgery in the hopes of eliminating these comorbid conditions and living a longer healthier life.  \par Denies tobacco use \par Denies GERD\par Family history of gastric cancer in multiple relatives [de-identified] : Presents today after recent EGD/biopsy showed Lamas's esophagus. Was planning for sleeve prior.

## 2021-11-03 ENCOUNTER — APPOINTMENT (OUTPATIENT)
Dept: GASTROENTEROLOGY | Facility: CLINIC | Age: 56
End: 2021-11-03
Payer: MEDICARE

## 2021-11-03 VITALS
WEIGHT: 315 LBS | HEART RATE: 95 BPM | BODY MASS INDEX: 40.43 KG/M2 | DIASTOLIC BLOOD PRESSURE: 82 MMHG | HEIGHT: 74 IN | SYSTOLIC BLOOD PRESSURE: 144 MMHG

## 2021-11-03 PROCEDURE — 99214 OFFICE O/P EST MOD 30 MIN: CPT

## 2021-11-04 NOTE — HISTORY OF PRESENT ILLNESS
[de-identified] : 56 year old man with obesity and simon's, referred here for RFA. \par \par Patient has morbid obesity and reflux and is being worked up and secheduled for bariatric surgery. Marlo wants a sleeve but due to his severe reflux RYBG was offerred. He had pre-op EGD and found to have simon's. \par \par Patient states that with the PPI he is on he has no symptoms. Without it he has post prandial nausea, chest burning. This would happen after every meal. No issues swallowing or eating. Food never gets stuck.  No post prandial vomiting. He has been on PPI for many years. \par \par

## 2021-11-04 NOTE — ASSESSMENT
[FreeTextEntry1] : 56 year old man with non dysplastic short segment simon's, here for evalauation prior to bariatric surgery. \par \par In terms of surgery, I agree with Dr. Allen that RYGB is the way to go. It is the best anti reflux surgery and this patient has reflux and simon's, and he will lose weight. Sleeve may worsen his symptoms and his simon's. He will really think about it. \par \par In terms of simon's, it's non dysplastic and short segment; in some patients you can make the argument to RFA non dysplastic long segment but not short segment, which is where we would just watch it with endoscopic eval. THe issue is that he had a GE junction polyp and it seems from the biopsies that it was all put together with the simon's biopsies, so not clear if this was a nodule and not clear if all taken out/needs EMR. \par \par He will be schedueld for EGD and possible EMR, re-biopsy of simon's.

## 2021-12-20 ENCOUNTER — APPOINTMENT (OUTPATIENT)
Dept: SURGERY | Facility: CLINIC | Age: 56
End: 2021-12-20
Payer: MEDICARE

## 2021-12-20 VITALS
RESPIRATION RATE: 16 BRPM | SYSTOLIC BLOOD PRESSURE: 118 MMHG | HEIGHT: 72 IN | HEART RATE: 83 BPM | TEMPERATURE: 97.1 F | DIASTOLIC BLOOD PRESSURE: 68 MMHG | BODY MASS INDEX: 42.66 KG/M2 | WEIGHT: 315 LBS | OXYGEN SATURATION: 9 %

## 2021-12-20 PROCEDURE — 99213 OFFICE O/P EST LOW 20 MIN: CPT

## 2022-01-06 ENCOUNTER — NON-APPOINTMENT (OUTPATIENT)
Age: 57
End: 2022-01-06

## 2022-01-18 ENCOUNTER — APPOINTMENT (OUTPATIENT)
Dept: SURGERY | Facility: HOSPITAL | Age: 57
End: 2022-01-18

## 2022-02-07 ENCOUNTER — APPOINTMENT (OUTPATIENT)
Dept: SURGERY | Facility: CLINIC | Age: 57
End: 2022-02-07

## 2022-04-11 ENCOUNTER — APPOINTMENT (OUTPATIENT)
Dept: SURGERY | Facility: CLINIC | Age: 57
End: 2022-04-11

## 2022-08-01 ENCOUNTER — APPOINTMENT (OUTPATIENT)
Dept: ORTHOPEDIC SURGERY | Facility: CLINIC | Age: 57
End: 2022-08-01

## 2022-08-01 VITALS
HEIGHT: 72 IN | DIASTOLIC BLOOD PRESSURE: 65 MMHG | SYSTOLIC BLOOD PRESSURE: 121 MMHG | BODY MASS INDEX: 42.66 KG/M2 | HEART RATE: 83 BPM | WEIGHT: 315 LBS

## 2022-08-01 PROCEDURE — 99204 OFFICE O/P NEW MOD 45 MIN: CPT | Mod: 25

## 2022-08-01 PROCEDURE — 73564 X-RAY EXAM KNEE 4 OR MORE: CPT | Mod: 50

## 2022-08-01 PROCEDURE — 20610 DRAIN/INJ JOINT/BURSA W/O US: CPT | Mod: 50

## 2022-08-01 RX ORDER — MELOXICAM 15 MG/1
15 TABLET ORAL
Qty: 30 | Refills: 0 | Status: ACTIVE | COMMUNITY
Start: 2022-08-01 | End: 1900-01-01

## 2022-08-01 NOTE — PROCEDURE
[de-identified] : I injected his right knee.\par I discussed at length with the patient the planned steroid and lidocaine injection. The risks, benefits, convalescence and alternatives were reviewed. The possible side effects discussed included but were not limited to: pain, swelling, heat, bleeding, and redness. Symptoms are generally mild but if they are extensive then contact the office. Giving pain relievers by mouth such as NSAIDs or Tylenol can generally treat the reactions to steroid and lidocaine. Rare cases of infection have been noted. Rash, hives and itching may occur post injection. If you have muscle pain or cramps, flushing and or swelling of the face, rapid heart beat, nausea, dizziness, fever, chills, headache, difficulty breathing, swelling in the arms or legs, or have a prickly feeling of your skin, contact a health care provider immediately. Following this discussion, the knee was prepped with Alcohol and under sterile condition the 80 mg Depo-Medrol and 6 cc Lidocaine injection was performed with a 20 gauge needle through a superolateral injection site. The needle was introduced into the joint, aspiration was performed to ensure intra-articular placement and the medication was injected. Upon withdrawal of the needle the site was cleaned with alcohol and a band aid applied. The patient tolerated the injection well and there were no adverse effects. Post injection instructions included no strenuous activity for 24 hours, cryotherapy and if there are any adverse effects to contact the office.\par I injected his left knee.\par I discussed at length with the patient the planned steroid and lidocaine injection. The risks, benefits, convalescence and alternatives were reviewed. The possible side effects discussed included but were not limited to: pain, swelling, heat, bleeding, and redness. Symptoms are generally mild but if they are extensive then contact the office. Giving pain relievers by mouth such as NSAIDs or Tylenol can generally treat the reactions to steroid and lidocaine. Rare cases of infection have been noted. Rash, hives and itching may occur post injection. If you have muscle pain or cramps, flushing and or swelling of the face, rapid heart beat, nausea, dizziness, fever, chills, headache, difficulty breathing, swelling in the arms or legs, or have a prickly feeling of your skin, contact a health care provider immediately. Following this discussion, the knee was prepped with Alcohol and under sterile condition the 80 mg Depo-Medrol and 6 cc Lidocaine injection was performed with a 20 gauge needle through a superolateral injection site. The needle was introduced into the joint, aspiration was performed to ensure intra-articular placement and the medication was injected. Upon withdrawal of the needle the site was cleaned with alcohol and a band aid applied. The patient tolerated the injection well and there were no adverse effects. Post injection instructions included no strenuous activity for 24 hours, cryotherapy and if there are any adverse effects to contact the office.\par

## 2022-08-01 NOTE — DISCUSSION/SUMMARY
[Medication Risks Reviewed] : Medication risks reviewed [Surgical risks reviewed] : Surgical risks reviewed [de-identified] : Patient is a 57-year-old male with severe bilateral knee osteoarthritis presenting today for initial evaluation.  I had a thorough discussion with him about conservative and surgical treatment options.  Due to the fact of his elevated BMI I do not think he is a surgical candidate at this time.  I have given him a referral to nutritionist to try to help assist with weight loss.  I recommended physical therapy.  I given a prescription for meloxicam 15 mg daily as needed for pain.  I gave him injection of cortisone to his bilateral knees which she tolerated well.  I will see him back in 3 months for repeat evaluation.  All questions were asked and answered

## 2022-08-01 NOTE — PHYSICAL EXAM
[de-identified] : GENERAL APPEARANCE: Well nourished and hydrated, pleasant, alert, and oriented x 3. Appears their stated age. \par HEENT: Normocephalic, extraocular eye motion intact. Nasal septum midline. Oral cavity clear. External auditory canal clear. \par RESPIRATORY: Breath sounds clear and audible in all lobes. No wheezing, No accessory muscle use.\par CARDIOVASCULAR: No apparent abnormalities. No lower leg edema. No varicosities. Pedal pulses are palpable.\par NEUROLOGIC: Sensation is normal, no muscle weakness in the upper or lower extremities.\par DERMATOLOGIC: No apparent skin lesions, moist, warm, no rash.\par SPINE: Cervical spine appears normal and moves freely; thoracic spine appears normal and moves freely; lumbosacral spine appears normal and moves freely, normal, nontender.\par MUSCULOSKELETAL: Hands, wrists, and elbows are normal and move freely, shoulders are normal and move freely. \par Psychiatric: Oriented to person, place, and time, insight and judgement were intact and the affect was normal. \par Musculoskeletal:. Left knee exam shows moderate effusion, ROM is 0-1 10 degrees, no instability, no pain with [place], medial and lateral joint line tenderness. \par Right knee exam shows moderate effusion, ROM is 0-1 10 degrees, no instability, no pain with [place], lateral joint line tenderness. \par 5/5 motor strength in bilateral lower extremities. Sensory: Intact in bilateral lower extremities. DTRs: Biceps, brachioradialis, triceps, patellar, ankle and plantar 2+ and symmetric bilaterally. Pulses: dorsalis pedis, posterior tibial, femoral, popliteal, and radial 2+ and symmetric bilaterally. \par  [de-identified] : 4 views of the right knee obtained the office today show no acute fracture or dislocation.  There is severe lateral joint space narrowing tricompartment degenerative changes bone-on-bone osteoarthritis consistent with Kellgren-Tobias grade 4 changes.\par \par MRI of the right knee dated 7/25/2022 shows severe lateral compartment arthritis with meniscus tear and stress reaction.  There is small subcortical stress fracture in the medial femoral condyle.  There is tearing of the medial meniscus posterior horn.  High-grade partial-thickness loss in the patellofemoral compartment.  Small joint effusion.  Small region of osteonecrosis in the proximal tibial metaphysis.\par \par 4 views of the left knee obtained the office today show no acute fracture dislocation.  There is severe medial joint space narrowing bone-on-bone osteoarthritis tricompartmental degenerative changes consistent with Kellgren-Tobias grade 4 changes.

## 2022-08-01 NOTE — HISTORY OF PRESENT ILLNESS
[de-identified] : Patient is a 57-year-old male here today for evaluation of right worse than left knee pain has been going on for the past 3 to 4 weeks.  Patient has a history of left knee arthritis and states he has previously been treated with cortisone injections.  Was told he needed to lose weight prior to any surgical intervention.  States over the last 3 to 4 weeks he is having increasing pain in the right knee.  Hurts with navigating stairs and arising reseated position.  States he has swelling in the knee.  States he feels some cracking and crunching in the knees.  Denies any neurovascular compromise.  Has been taking naproxen with some relief of the pain.

## 2022-12-07 ENCOUNTER — APPOINTMENT (OUTPATIENT)
Dept: ORTHOPEDIC SURGERY | Facility: CLINIC | Age: 57
End: 2022-12-07

## 2022-12-07 VITALS
DIASTOLIC BLOOD PRESSURE: 77 MMHG | WEIGHT: 315 LBS | SYSTOLIC BLOOD PRESSURE: 115 MMHG | BODY MASS INDEX: 42.66 KG/M2 | HEIGHT: 72 IN | HEART RATE: 87 BPM

## 2022-12-07 PROCEDURE — 20610 DRAIN/INJ JOINT/BURSA W/O US: CPT | Mod: 50

## 2022-12-07 PROCEDURE — 99214 OFFICE O/P EST MOD 30 MIN: CPT | Mod: 25

## 2022-12-07 RX ORDER — PREDNISONE 10 MG/1
10 TABLET ORAL
Qty: 40 | Refills: 0 | Status: ACTIVE | COMMUNITY
Start: 2022-07-18

## 2022-12-07 RX ORDER — OXYCODONE 20 MG/1
20 TABLET ORAL
Qty: 120 | Refills: 0 | Status: ACTIVE | COMMUNITY
Start: 2022-11-04

## 2022-12-07 NOTE — PHYSICAL EXAM
[LE] : Sensory: Intact in bilateral lower extremities [ALL] : dorsalis pedis, posterior tibial, femoral, popliteal, and radial 2+ and symmetric bilaterally [de-identified] : GENERAL APPEARANCE: Well nourished and hydrated, pleasant, alert, and oriented x 3. Appears their stated age. \par HEENT: Normocephalic, extraocular eye motion intact. Nasal septum midline. Oral cavity clear. External auditory canal clear. \par RESPIRATORY: Breath sounds clear and audible in all lobes. No wheezing, No accessory muscle use.\par CARDIOVASCULAR: No apparent abnormalities. No lower leg edema. No varicosities. Pedal pulses are palpable.\par NEUROLOGIC: Sensation is normal, no muscle weakness in the upper or lower extremities.\par DERMATOLOGIC: No apparent skin lesions, moist, warm, no rash.\par SPINE: Cervical spine appears normal and moves freely; thoracic spine appears normal and moves freely; lumbosacral spine appears normal and moves freely, normal, nontender.\par MUSCULOSKELETAL: Hands, wrists, and elbows are normal and move freely, shoulders are normal and move freely.  [de-identified] : Bilateral knee exam shows medial jointline tenderness and lateral jointline tenderness, ROM 0-120 [de-identified] : 5V xray of the b/l knee done in the office today and reviewed by Dr. Valente Prater demonstrates bone on bone lateral right knee and bone on bone medial left knee \par \par

## 2022-12-07 NOTE — HISTORY OF PRESENT ILLNESS
[Pain Location] : pain [Stable] : stable [4] : a current pain level of 4/10 [1] : a minimum pain level of 1/10 [6] : a maximum pain level of 6/10 [Walking] : worsened by walking [Rest] : relieved by rest [de-identified] : 58 y/o M pt presents for another evaluation of b/l knee pain. The left knee pain is worse.The pain is intermittent. He was seen by Dr. pacheco. He says he has lost 20 pounds which have helped him. He had injection and it did help. He says his pain is generalized on both his knees. He is under chronic opioid use. He sees pain management. He takes Oxycodone 30 mg for his back and knees. \par \par \par

## 2022-12-07 NOTE — DISCUSSION/SUMMARY
[Medication Risks Reviewed] : Medication risks reviewed [Surgical risks reviewed] : Surgical risks reviewed [de-identified] : 58 y/o M pt presents with bone on bone lateral right knee and bone on bone medial left knee. Based on the imaging, the pt is a reasonable candidate for a staged bilateral TKA, starting. We discussed the surgery in detail. We also discussed exhausting conservative therapy options such as cortisone injections, HA injections, and PT. The pt opted for a bilateral knee cortisone injections which he tolerated well. He is interested in PT to improve strength and for weight loss. We agreed and sent a script for PT. He understands before surgery he needs to wean off of narcotics prior to surgery and will have to see pain management. He understands obesity does raise the risk of infection, he is currently working toward weight loss.He will f/u in 3 months for repeat injections if needed. \par \par The patient has been counseled regarding the elevated risks associated with surgical complications in patients with a BMI> 35. The patient demonstrates an understanding of the increased risk. After a lengthy discussion, the patient agreed to make a coordinated effort at weight loss prior to surgical intervention. The patient understands our BMI policy and they will make a conscious effort to improve their BMI.		 \par \par The patient is a 57 year individual with end stage arthritis of their b/l knee joint. Based upon the patient's continued symptoms and failure to respond to conservative treatment (including HA injections, cortisone injections, over the counter medications, and PT) I have recommended a b/l total knee arthroplasty for this patient. A long discussion took place with the patient describing what a total joint replacement is and what the procedure would entail. A total knee arthroplasty model, similar to the implant that was used during the operation, was utilized to demonstrate and to discuss the various bearing surfaces of the implants. The hospitalization and post-operative care and rehabilitation were also discussed. The use of perioperative antibiotics and DVT prophylaxis were discussed. The risk, benefits and alternatives to a surgical intervention were discussed at length with the patient. The patient was also advised of risks related to the medical comorbidities, elevated body mass index (BMI), and smoking where applicable. We discussed how to reduce modifiable risk factors and encouraged smoking cessation were applicable.. A lengthy discussion took place to review the most common complications including but not limited to: deep vein thrombosis, pulmonary embolus, heart attack, stroke, infection, wound breakdown, numbness, damage to nerves, tendon, muscles, arteries or other blood vessels, death and other possible complications from anesthesia. The patient was told that we will take steps to minimize these risks by using sterile technique, antibiotics and DVT prophylaxis when appropriate and follow the patient postoperatively in the office setting to monitor progress. The possibility of recurrent pain, no improvement in pain and actual worsening of pain were also discussed with the patient.\par The discharge plan of care focused on the patient going home following surgery. The patient was encouraged to make the necessary arrangements to have someone stay with them when they are discharged home. Following discharge, a home care nurse was to the patient. The home care nurse would open the patient’s home care case and request home physical therapy services. Home physical therapy was to commence following discharge provided it was appropriate and covered by the health insurance benefit plan. \par The benefits of surgery were discussed with the patient including the potential for improving his current clinical condition through operative intervention. Alternatives to surgical intervention including continued conservative management were also discussed in detail. All questions were answered to the satisfaction of the patient. The treatment plan of care, as well as a model of a total knee arthroplasty equivalent to the one that will be used for their total joint replacement, was shared with the patient. The patient agreed to the plan of care as well as the use of implants in their total joint replacement.

## 2022-12-07 NOTE — PROCEDURE
[de-identified] : I injected the patient's right knee today with cortisone for primary osteoarthritis.\par \par I discussed at length with the patient the planned steroid and lidocaine injection. The risks, benefits, convalescence and alternatives were reviewed. The possible side effects discussed included but were not limited to: pain, swelling, heat, bleeding, and redness. Symptoms are generally mild but if they are extensive then contact the office. Giving pain relievers by mouth such as NSAIDs or Tylenol can generally treat the reactions to steroid and lidocaine. Rare cases of infection have been noted. Rash, hives and itching may occur post injection. If you have muscle pain or cramps, flushing and or swelling of the face, rapid heart beat, nausea, dizziness, fever, chills, headache, difficulty breathing, swelling in the arms or legs, or have a prickly feeling of your skin, contact a health care provider immediately. Following this discussion, the knee was prepped with Alcohol and under sterile condition the 80 mg Depo-Medrol and 6 cc Lidocaine injection was performed with a 20 gauge needle through a superolateral injection site. The needle was introduced into the joint, aspiration was performed to ensure intra-articular placement and the medication was injected. Upon withdrawal of the needle the site was cleaned with alcohol and a band aid applied. The patient tolerated the injection well and there were no adverse effects. Post injection instructions included no strenuous activity for 24 hours, cryotherapy and if there are any adverse effects to contact the office.	 \par \par I injected the patient's left knee today with cortisone for primary osteoarthritis.\par \par I discussed at length with the patient the planned steroid and lidocaine injection. The risks, benefits, convalescence and alternatives were reviewed. The possible side effects discussed included but were not limited to: pain, swelling, heat, bleeding, and redness. Symptoms are generally mild but if they are extensive then contact the office. Giving pain relievers by mouth such as NSAIDs or Tylenol can generally treat the reactions to steroid and lidocaine. Rare cases of infection have been noted. Rash, hives and itching may occur post injection. If you have muscle pain or cramps, flushing and or swelling of the face, rapid heart beat, nausea, dizziness, fever, chills, headache, difficulty breathing, swelling in the arms or legs, or have a prickly feeling of your skin, contact a health care provider immediately. Following this discussion, the knee was prepped with Alcohol and under sterile condition the 80 mg Depo-Medrol and 6 cc Lidocaine injection was performed with a 20 gauge needle through a superolateral injection site. The needle was introduced into the joint, aspiration was performed to ensure intra-articular placement and the medication was injected. Upon withdrawal of the needle the site was cleaned with alcohol and a band aid applied. The patient tolerated the injection well and there were no adverse effects. Post injection instructions included no strenuous activity for 24 hours, cryotherapy and if there are any adverse effects to contact the office.

## 2022-12-07 NOTE — END OF VISIT
[FreeTextEntry3] : I, Rosemary Arrington, acted solely as a scribe for Dr. Valente Prater on 12/07/2022

## 2023-03-17 ENCOUNTER — APPOINTMENT (OUTPATIENT)
Dept: ORTHOPEDIC SURGERY | Facility: CLINIC | Age: 58
End: 2023-03-17
Payer: MEDICARE

## 2023-03-17 VITALS
SYSTOLIC BLOOD PRESSURE: 144 MMHG | BODY MASS INDEX: 42.66 KG/M2 | WEIGHT: 315 LBS | HEIGHT: 72 IN | DIASTOLIC BLOOD PRESSURE: 83 MMHG | HEART RATE: 81 BPM

## 2023-03-17 PROCEDURE — 20610 DRAIN/INJ JOINT/BURSA W/O US: CPT | Mod: 50

## 2023-03-17 PROCEDURE — 99214 OFFICE O/P EST MOD 30 MIN: CPT | Mod: 25

## 2023-03-17 NOTE — PROCEDURE
[de-identified] : Patient received bilateral knee 80mg cortisone injection for osteoarthritis \par I discussed at length with the patient the planned steroid and lidocaine injection. The risks, benefits, convalescence and alternatives were reviewed. The possible side effects discussed included but were not limited to: pain, swelling, heat, bleeding, and redness. Symptoms are generally mild but if they are extensive then contact the office. Giving pain relievers by mouth such as NSAIDs or Tylenol can generally treat the reactions to steroid and lidocaine. Rare cases of infection have been noted. Rash, hives and itching may occur post injection. If you have muscle pain or cramps, flushing and or swelling of the face, rapid heart beat, nausea, dizziness, fever, chills, headache, difficulty breathing, swelling in the arms or legs, or have a prickly feeling of your skin, contact a health care provider immediately. Following this discussion, the knee was prepped with Alcohol and under sterile condition the 80 mg Depo-Medrol and 6 cc Lidocaine injection was performed with a 20 gauge needle through a superolateral injection site. The needle was introduced into the joint, aspiration was performed to ensure intra-articular placement and the medication was injected. Upon withdrawal of the needle the site was cleaned with alcohol and a band aid applied. The patient tolerated the injection well and there were no adverse effects. Post injection instructions included no strenuous activity for 24 hours, cryotherapy and if there are any adverse effects to contact the office. \par

## 2023-03-17 NOTE — REVIEW OF SYSTEMS
[Joint Pain] : joint pain [Joint Stiffness] : joint stiffness [Joint Swelling] : joint swelling [Negative] : Heme/Lymph [FreeTextEntry9] : Bilateral knee

## 2023-03-17 NOTE — PHYSICAL EXAM
[de-identified] : GENERAL APPEARANCE: Well nourished and hydrated, pleasant, alert, and oriented x 3. Appears their stated age. \par HEENT: Normocephalic, extraocular eye motion intact. Nasal septum midline. Oral cavity clear. External auditory canal clear. \par RESPIRATORY: Breath sounds clear and audible in all lobes. No wheezing, No accessory muscle use.\par CARDIOVASCULAR: No apparent abnormalities. No lower leg edema. No varicosities. Pedal pulses are palpable.\par NEUROLOGIC: Sensation is normal, no muscle weakness in the upper or lower extremities.\par DERMATOLOGIC: No apparent skin lesions, moist, warm, no rash.\par SPINE: Cervical spine appears normal and moves freely; thoracic spine appears normal and moves freely; lumbosacral spine appears normal and moves freely, normal, nontender.\par MUSCULOSKELETAL: Hands, wrists, and elbows are normal and move freely, shoulders are normal and move freely. \par Musculoskeletal:. Bilateral knee exam shows medial jointline tenderness and lateral jointline tenderness, ROM 0-120. \par 5/5 motor strength in bilateral lower extremities. Sensory: Intact in bilateral lower extremities. DTRs: Biceps, brachioradialis, triceps, patellar, ankle and plantar 2+ and symmetric bilaterally. Pulses: dorsalis pedis, posterior tibial, femoral, popliteal, and radial 2+ and symmetric bilaterally. \par

## 2023-03-17 NOTE — HISTORY OF PRESENT ILLNESS
[Pain Location] : pain [Stable] : stable [___ yrs] : [unfilled] year(s) ago [5] : a current pain level of 5/10 [7] : a maximum pain level of 7/10 [de-identified] : This is a 57-year-old male with bilateral knee pain left worse than right.  He has end-stage knee osteoarthritis and he had cortisone injection which gave him good relief.  He is under the care of a pain management taking oxycodone 30 mg for back pain and knee pain.\par He has trouble with long standing walking and climbing stairs.  \par He is considering left knee replacement after the summer is still able to play some golf with his 80-year-old father\par  his father had a  joint replacement with Dr. Prater has a great outcome.  Patient had history of sleep apnea but after he lost some weight it went away he is not using CPAP or BiPAP he denies a history of diabetes he does have hypertension and takes the medication  .\par

## 2023-03-17 NOTE — DISCUSSION/SUMMARY
[de-identified] : Medication risks reviewed. Surgical risks reviewed. 56 y/o M pt presents with bone on bone lateral right knee and bone on bone medial left knee. Based on the imaging, the pt is a reasonable candidate for a staged bilateral TKA, starting.  He states his left knee pain is worse therefore he  would like topursue left knee replacement  in November . we discussed the surgery in detail. We also discussed exhausting conservative therapy options such as cortisone injections, HA injections, and PT. The pt opted for a bilateral knee cortisone injections which he tolerated well.  He will continue to work on weight loss.  Patient understand and his postop pain medication will be managed by his pain management . He will f/u in 3 months for repeat injections if needed. \par \par The patient has been counseled regarding the elevated risks associated with surgical complications in patients with a BMI> 35. The patient demonstrates an understanding of the increased risk. After a lengthy discussion, the patient agreed to make a coordinated effort at weight loss prior to surgical intervention. The patient understands our BMI policy and they will make a conscious effort to improve their BMI.		 \par \par The patient is a 57 year individual with end stage arthritis of their b/l knee joint. Based upon the patient's continued symptoms and failure to respond to conservative treatment (including HA injections, cortisone injections, over the counter medications, and PT) I have recommended a b/l total knee arthroplasty for this patient. A long discussion took place with the patient describing what a total joint replacement is and what the procedure would entail. A total knee arthroplasty model, similar to the implant that was used during the operation, was utilized to demonstrate and to discuss the various bearing surfaces of the implants. The hospitalization and post-operative care and rehabilitation were also discussed. The use of perioperative antibiotics and DVT prophylaxis were discussed. The risk, benefits and alternatives to a surgical intervention were discussed at length with the patient. The patient was also advised of risks related to the medical comorbidities, elevated body mass index (BMI), and smoking where applicable. We discussed how to reduce modifiable risk factors and encouraged smoking cessation were applicable.. A lengthy discussion took place to review the most common complications including but not limited to: deep vein thrombosis, pulmonary embolus, heart attack, stroke, infection, wound breakdown, numbness, damage to nerves, tendon, muscles, arteries or other blood vessels, death and other possible complications from anesthesia. The patient was told that we will take steps to minimize these risks by using sterile technique, antibiotics and DVT prophylaxis when appropriate and follow the patient postoperatively in the office setting to monitor progress. The possibility of recurrent pain, no improvement in pain and actual worsening of pain were also discussed with the patient.\par The discharge plan of care focused on the patient going home following surgery. The patient was encouraged to make the necessary arrangements to have someone stay with them when they are discharged home. Following discharge, a home care nurse was to the patient. The home care nurse would open the patient’s home care case and request home physical therapy services. Home physical therapy was to commence following discharge provided it was appropriate and covered by the health insurance benefit plan. \par The benefits of surgery were discussed with the patient including the potential for improving his current clinical condition through operative intervention. Alternatives to surgical intervention including continued conservative management were also discussed in detail. All questions were answered to the satisfaction of the patient. The treatment plan of care, as well as a model of a total knee arthroplasty equivalent to the one that will be used for their total joint replacement, was shared with the patient. The patient agreed to the plan of care as well as the use of implants in their total joint replacement. \par

## 2023-04-05 ENCOUNTER — EMERGENCY (EMERGENCY)
Facility: HOSPITAL | Age: 58
LOS: 1 days | Discharge: HOME | End: 2023-04-05
Attending: EMERGENCY MEDICINE
Payer: MEDICARE

## 2023-04-05 VITALS
OXYGEN SATURATION: 95 % | HEART RATE: 87 BPM | TEMPERATURE: 98 F | RESPIRATION RATE: 18 BRPM | SYSTOLIC BLOOD PRESSURE: 130 MMHG | DIASTOLIC BLOOD PRESSURE: 73 MMHG

## 2023-04-05 PROCEDURE — 82962 GLUCOSE BLOOD TEST: CPT

## 2023-04-05 PROCEDURE — 99284 EMERGENCY DEPT VISIT MOD MDM: CPT

## 2023-04-05 PROCEDURE — 99285 EMERGENCY DEPT VISIT HI MDM: CPT

## 2023-04-05 NOTE — ED PROVIDER NOTE - OBJECTIVE STATEMENT
57 year old male presents with intoxication. Pt states he went to a golf outing today, started to drink at noon, continued ot drink after the golf outing, and was found sleeping outside of a bar. Pt offers no complaints at this time, no chets pain, headache, neck pain

## 2023-04-05 NOTE — ED ADULT TRIAGE NOTE - CHIEF COMPLAINT QUOTE
Pt BIBA c/o etoh intoxicated; reported etoh drinking the entire day today. Pt was found on the floor in the bar on EMS arrival. denies any injuries sustained. Pt is alert and O x4. No obvious injuries noted on arrival. Capillary glucose of 113. Pt BIBA c/o etoh intoxicated; reported etoh drinking the entire day today. Pt was found on the floor in the bar on EMS arrival. denies any injuries sustained. Pt is alert and O x4. No obvious injuries noted on arrival. Capillary glucose of 113. Changed pt to yellow gown.

## 2023-04-05 NOTE — ED ADULT NURSE NOTE - NSIMPLEMENTINTERV_GEN_ALL_ED
Implemented All Fall Risk Interventions:  Gravel Switch to call system. Call bell, personal items and telephone within reach. Instruct patient to call for assistance. Room bathroom lighting operational. Non-slip footwear when patient is off stretcher. Physically safe environment: no spills, clutter or unnecessary equipment. Stretcher in lowest position, wheels locked, appropriate side rails in place. Provide visual cue, wrist band, yellow gown, etc. Monitor gait and stability. Monitor for mental status changes and reorient to person, place, and time. Review medications for side effects contributing to fall risk. Reinforce activity limits and safety measures with patient and family.

## 2023-04-05 NOTE — ED ADULT NURSE NOTE - CHIEF COMPLAINT QUOTE
Pt BIBA c/o etoh intoxicated; reported etoh drinking the entire day today. Pt was found on the floor in the bar on EMS arrival. denies any injuries sustained. Pt is alert and O x4. No obvious injuries noted on arrival. Capillary glucose of 113. Changed pt to yellow gown.

## 2023-04-05 NOTE — ED ADULT NURSE NOTE - OBJECTIVE STATEMENT
Pt. BIBA for intox, found on floor in a bar.  Pt. states he thinks he's here because "I drank too much", when asked how many, states having 30 drinks.  When asked if he drinks daily, he denies.  Denies injury/pain.  Asking for his clothes.

## 2023-04-05 NOTE — ED ADULT TRIAGE NOTE - CCCP TRG CHIEF CMPLNT
HPI:  5/3/2019    This is a 48 y.o.male   Chief Complaint   Patient presents with    Foot Pain     left foot pain along the outside- has been happening for about a month off and on     HPI    Left foot pain  Started about 1 month ago  Getting worse   More frequent   Taking aleve without relief   Wearing same pair of shoes  Denies bruising or swelling  Had a fall a few weeks ago  Does not think he injured it     /82   Pulse 80   Ht 5' 10\" (1.778 m)   Wt 258 lb (117 kg)   BMI 37.02 kg/m²     Allergies   Allergen Reactions    Tylenol [Acetaminophen] Shortness Of Breath     Tylenol 3, makes him feel like hes having a heart attack, however can take Vicodin     Current Outpatient Medications   Medication Sig Dispense Refill    polycarbophil (FIBERCON) 625 MG tablet Take 625 mg by mouth daily      metFORMIN (GLUCOPHAGE) 500 MG tablet TAKE 1 TABLET BY MOUTH DAILY (WITH BREAKFAST) 30 tablet 5    lisinopril (PRINIVIL;ZESTRIL) 10 MG tablet TAKE ONE (1) TABLET BY MOUTH DAILY 30 tablet 5    atorvastatin (LIPITOR) 20 MG tablet TAKE ONE (1) TABLET BY MOUTH DAILY 30 tablet 5    docusate sodium (COLACE) 100 MG capsule Take 100 mg by mouth daily       aspirin EC 81 MG EC tablet Take 1 tablet by mouth daily 30 tablet 0     No current facility-administered medications for this visit. Review of Systems  See hpi    Physical Exam   Constitutional: He is oriented to person, place, and time. He appears well-developed and well-nourished. No distress. HENT:   Head: Normocephalic and atraumatic. Musculoskeletal: He exhibits no edema. Right foot: Normal.        Left foot: There is tenderness. There is normal range of motion, no swelling, no crepitus and no deformity. Neurological: He is alert and oriented to person, place, and time. Coordination normal.   Skin: Skin is warm and dry. He is not diaphoretic. No erythema. Psychiatric: He has a normal mood and affect.  Thought content normal. Assessment/Plan:  Bette Knox was seen today for foot pain. Diagnoses and all orders for this visit:  Left foot pain  -     Cancel: XR FOOT LEFT (2 VIEWS);  Future  Xray today  Did have a injury with a fall  RICE  Podiatry if abnormal imaging     Follow up for new / worsening symptoms or failing to improve as anticipated    Electronically signed by TONIA Melara CNP on 5/3/2019 at 5:09 PM ingestion

## 2023-04-05 NOTE — ED PROVIDER NOTE - PATIENT PORTAL LINK FT
You can access the FollowMyHealth Patient Portal offered by [facility] by registering at the following website: [email or web address]. By joining [facility]’s FollowMyHealth portal, you will also be able to view your health information using other applications (apps) compatible with our system.

## 2023-06-19 ENCOUNTER — APPOINTMENT (OUTPATIENT)
Dept: ORTHOPEDIC SURGERY | Facility: CLINIC | Age: 58
End: 2023-06-19
Payer: MEDICARE

## 2023-06-19 VITALS
HEART RATE: 80 BPM | WEIGHT: 305 LBS | SYSTOLIC BLOOD PRESSURE: 107 MMHG | BODY MASS INDEX: 41.31 KG/M2 | DIASTOLIC BLOOD PRESSURE: 72 MMHG | HEIGHT: 72 IN

## 2023-06-19 DIAGNOSIS — Z79.891 LONG TERM (CURRENT) USE OF OPIATE ANALGESIC: ICD-10-CM

## 2023-06-19 DIAGNOSIS — M17.0 BILATERAL PRIMARY OSTEOARTHRITIS OF KNEE: ICD-10-CM

## 2023-06-19 DIAGNOSIS — E66.01 MORBID (SEVERE) OBESITY DUE TO EXCESS CALORIES: ICD-10-CM

## 2023-06-19 PROCEDURE — 20610 DRAIN/INJ JOINT/BURSA W/O US: CPT | Mod: 50

## 2023-06-19 PROCEDURE — 99214 OFFICE O/P EST MOD 30 MIN: CPT | Mod: 25

## 2023-06-19 NOTE — PHYSICAL EXAM
[de-identified] : GENERAL APPEARANCE: Well nourished and hydrated, pleasant, alert, and oriented x 3. Appears their stated age. \par HEENT: Normocephalic, extraocular eye motion intact. Nasal septum midline. Oral cavity clear. External auditory canal clear. \par RESPIRATORY: Breath sounds clear and audible in all lobes. No wheezing, No accessory muscle use.\par CARDIOVASCULAR: No apparent abnormalities. No lower leg edema. No varicosities. Pedal pulses are palpable.\par NEUROLOGIC: Sensation is normal, no muscle weakness in the upper or lower extremities.\par DERMATOLOGIC: No apparent skin lesions, moist, warm, no rash.\par SPINE: Cervical spine appears normal and moves freely; thoracic spine appears normal and moves freely; lumbosacral spine appears normal and moves freely, normal, nontender.\par MUSCULOSKELETAL: Hands, wrists, and elbows are normal and move freely, shoulders are normal and move freely. \par Musculoskeletal:. Bilateral knee exam shows medial jointline tenderness and lateral jointline tenderness, ROM 0-120. moderate left knee effusion is present\par 5/5 motor strength in bilateral lower extremities. Sensory: Intact in bilateral lower extremities. DTRs: Biceps, brachioradialis, triceps, patellar, ankle and plantar 2+ and symmetric bilaterally. Pulses: dorsalis pedis, posterior tibial, femoral, popliteal, and radial 2+ and symmetric bilaterally. \par

## 2023-06-19 NOTE — REVIEW OF SYSTEMS
[Joint Pain] : joint pain [Joint Stiffness] : joint stiffness [Joint Swelling] : joint swelling [FreeTextEntry9] : b/l knee

## 2023-06-19 NOTE — DISCUSSION/SUMMARY
[de-identified] : Medication risks reviewed. Surgical risks reviewed. 57 y/o M pt presents with bone on bone lateral right knee and bone on bone medial left knee. Based on the imaging, the pt is a reasonable candidate for a staged bilateral TKA, starting.  He states his left knee pain is worse therefore he  would like topursue left knee replacement  in November . we discussed the surgery in detail. We also discussed exhausting conservative therapy options such as cortisone injections, HA injections, and PT. The pt opted for a bilateral knee cortisone injections which he tolerated well.  He will continue to work on weight loss.  Patient understand and his postop pain medication will be managed by his pain management . he needs cardiac, medical and pain management clearnace.  He will f/u in 3 months for repeat injections in the right knee if needed. \par \par The patient has been counseled regarding the elevated risks associated with surgical complications in patients with a BMI> 35. The patient demonstrates an understanding of the increased risk. After a lengthy discussion, the patient agreed to make a coordinated effort at weight loss prior to surgical intervention. The patient understands our BMI policy and they will make a conscious effort to improve their BMI.		 \par \par The patient is a 58 year individual with end stage arthritis of their b/l knee joint. Based upon the patient's continued symptoms and failure to respond to conservative treatment (including HA injections, cortisone injections, over the counter medications, and PT) I have recommended a b/l total knee arthroplasty for this patient. A long discussion took place with the patient describing what a total joint replacement is and what the procedure would entail. A total knee arthroplasty model, similar to the implant that was used during the operation, was utilized to demonstrate and to discuss the various bearing surfaces of the implants. The hospitalization and post-operative care and rehabilitation were also discussed. The use of perioperative antibiotics and DVT prophylaxis were discussed. The risk, benefits and alternatives to a surgical intervention were discussed at length with the patient. The patient was also advised of risks related to the medical comorbidities, elevated body mass index (BMI), and smoking where applicable. We discussed how to reduce modifiable risk factors and encouraged smoking cessation were applicable.. A lengthy discussion took place to review the most common complications including but not limited to: deep vein thrombosis, pulmonary embolus, heart attack, stroke, infection, wound breakdown, numbness, damage to nerves, tendon, muscles, arteries or other blood vessels, death and other possible complications from anesthesia. The patient was told that we will take steps to minimize these risks by using sterile technique, antibiotics and DVT prophylaxis when appropriate and follow the patient postoperatively in the office setting to monitor progress. The possibility of recurrent pain, no improvement in pain and actual worsening of pain were also discussed with the patient.\par The discharge plan of care focused on the patient going home following surgery. The patient was encouraged to make the necessary arrangements to have someone stay with them when they are discharged home. Following discharge, a home care nurse was to the patient. The home care nurse would open the patient’s home care case and request home physical therapy services. Home physical therapy was to commence following discharge provided it was appropriate and covered by the health insurance benefit plan. \par The benefits of surgery were discussed with the patient including the potential for improving his current clinical condition through operative intervention. Alternatives to surgical intervention including continued conservative management were also discussed in detail. All questions were answered to the satisfaction of the patient. The treatment plan of care, as well as a model of a total knee arthroplasty equivalent to the one that will be used for their total joint replacement, was shared with the patient. The patient agreed to the plan of care as well as the use of implants in their total joint replacement. \par

## 2023-06-19 NOTE — PROCEDURE
[de-identified] : Patient received bilateral knee 80mg cortisone injection for osteoarthritis I aspirated clear yellow 100 CC synovial fluid from his left knee \par  \par I discussed at length with the patient the planned steroid and lidocaine injection. The risks, benefits, convalescence and alternatives were reviewed. The possible side effects discussed included but were not limited to: pain, swelling, heat, bleeding, and redness. Symptoms are generally mild but if they are extensive then contact the office. Giving pain relievers by mouth such as NSAIDs or Tylenol can generally treat the reactions to steroid and lidocaine. Rare cases of infection have been noted. Rash, hives and itching may occur post injection. If you have muscle pain or cramps, flushing and or swelling of the face, rapid heart beat, nausea, dizziness, fever, chills, headache, difficulty breathing, swelling in the arms or legs, or have a prickly feeling of your skin, contact a health care provider immediately. Following this discussion, the knee was prepped with Alcohol and under sterile condition the 80 mg Depo-Medrol and 6 cc Lidocaine injection was performed with a 20 gauge needle through a superolateral injection site. The needle was introduced into the joint, aspiration was performed to ensure intra-articular placement and the medication was injected. Upon withdrawal of the needle the site was cleaned with alcohol and a band aid applied. The patient tolerated the injection well and there were no adverse effects. Post injection instructions included no strenuous activity for 24 hours, cryotherapy and if there are any adverse effects to contact the office. \par

## 2023-06-19 NOTE — HISTORY OF PRESENT ILLNESS
[Pain Location] : pain [Worsening] : worsening [6] : a current pain level of 6/10 [7] : a maximum pain level of 7/10 [de-identified] : This is a 58-year-old male with bilateral knee pain left worse than right.  He has end-stage knee osteoarthritis and he had cortisone injection which gave him good relief.  He is under the care of a pain management taking oxycodone 30 mg for back pain and knee pain.\par He has trouble with long standing walking and climbing stairs.  \par He is considering left knee replacement after the summer is still able to play some golf with his 80-year-old father\par  his father had a  joint replacement with Dr. Prater has a great outcome.  Pt had history of sleep apnea but after he lost some weight it went away he is not using CPAP or BiPAP he denies a history of diabetes he does have hypertension and takes the medication  .\par

## 2023-11-01 ENCOUNTER — OUTPATIENT (OUTPATIENT)
Dept: OUTPATIENT SERVICES | Facility: HOSPITAL | Age: 58
LOS: 1 days | End: 2023-11-01
Payer: MEDICARE

## 2023-11-01 VITALS
OXYGEN SATURATION: 98 % | RESPIRATION RATE: 16 BRPM | HEIGHT: 73 IN | DIASTOLIC BLOOD PRESSURE: 60 MMHG | TEMPERATURE: 98 F | WEIGHT: 315 LBS | SYSTOLIC BLOOD PRESSURE: 128 MMHG | HEART RATE: 79 BPM

## 2023-11-01 DIAGNOSIS — I10 ESSENTIAL (PRIMARY) HYPERTENSION: ICD-10-CM

## 2023-11-01 DIAGNOSIS — M10.9 GOUT, UNSPECIFIED: ICD-10-CM

## 2023-11-01 DIAGNOSIS — G47.33 OBSTRUCTIVE SLEEP APNEA (ADULT) (PEDIATRIC): ICD-10-CM

## 2023-11-01 DIAGNOSIS — Z01.818 ENCOUNTER FOR OTHER PREPROCEDURAL EXAMINATION: ICD-10-CM

## 2023-11-01 DIAGNOSIS — M17.12 UNILATERAL PRIMARY OSTEOARTHRITIS, LEFT KNEE: ICD-10-CM

## 2023-11-01 DIAGNOSIS — Z29.9 ENCOUNTER FOR PROPHYLACTIC MEASURES, UNSPECIFIED: ICD-10-CM

## 2023-11-01 LAB
A1C WITH ESTIMATED AVERAGE GLUCOSE RESULT: 6.4 % — HIGH (ref 4–5.6)
A1C WITH ESTIMATED AVERAGE GLUCOSE RESULT: 6.4 % — HIGH (ref 4–5.6)
ANION GAP SERPL CALC-SCNC: 10 MMOL/L — SIGNIFICANT CHANGE UP (ref 5–17)
ANION GAP SERPL CALC-SCNC: 10 MMOL/L — SIGNIFICANT CHANGE UP (ref 5–17)
APTT BLD: 30.3 SEC — SIGNIFICANT CHANGE UP (ref 24.5–35.6)
APTT BLD: 30.3 SEC — SIGNIFICANT CHANGE UP (ref 24.5–35.6)
BLD GP AB SCN SERPL QL: SIGNIFICANT CHANGE UP
BLD GP AB SCN SERPL QL: SIGNIFICANT CHANGE UP
BUN SERPL-MCNC: 35.1 MG/DL — HIGH (ref 8–20)
BUN SERPL-MCNC: 35.1 MG/DL — HIGH (ref 8–20)
CALCIUM SERPL-MCNC: 9.4 MG/DL — SIGNIFICANT CHANGE UP (ref 8.4–10.5)
CALCIUM SERPL-MCNC: 9.4 MG/DL — SIGNIFICANT CHANGE UP (ref 8.4–10.5)
CHLORIDE SERPL-SCNC: 100 MMOL/L — SIGNIFICANT CHANGE UP (ref 96–108)
CHLORIDE SERPL-SCNC: 100 MMOL/L — SIGNIFICANT CHANGE UP (ref 96–108)
CO2 SERPL-SCNC: 26 MMOL/L — SIGNIFICANT CHANGE UP (ref 22–29)
CO2 SERPL-SCNC: 26 MMOL/L — SIGNIFICANT CHANGE UP (ref 22–29)
CREAT SERPL-MCNC: 1.13 MG/DL — SIGNIFICANT CHANGE UP (ref 0.5–1.3)
CREAT SERPL-MCNC: 1.13 MG/DL — SIGNIFICANT CHANGE UP (ref 0.5–1.3)
EGFR: 75 ML/MIN/1.73M2 — SIGNIFICANT CHANGE UP
EGFR: 75 ML/MIN/1.73M2 — SIGNIFICANT CHANGE UP
ESTIMATED AVERAGE GLUCOSE: 137 MG/DL — HIGH (ref 68–114)
ESTIMATED AVERAGE GLUCOSE: 137 MG/DL — HIGH (ref 68–114)
GLUCOSE SERPL-MCNC: 111 MG/DL — HIGH (ref 70–99)
GLUCOSE SERPL-MCNC: 111 MG/DL — HIGH (ref 70–99)
HCT VFR BLD CALC: 37.6 % — LOW (ref 39–50)
HCT VFR BLD CALC: 37.6 % — LOW (ref 39–50)
HGB BLD-MCNC: 12.6 G/DL — LOW (ref 13–17)
HGB BLD-MCNC: 12.6 G/DL — LOW (ref 13–17)
INR BLD: 0.95 RATIO — SIGNIFICANT CHANGE UP (ref 0.85–1.18)
INR BLD: 0.95 RATIO — SIGNIFICANT CHANGE UP (ref 0.85–1.18)
MCHC RBC-ENTMCNC: 30.7 PG — SIGNIFICANT CHANGE UP (ref 27–34)
MCHC RBC-ENTMCNC: 30.7 PG — SIGNIFICANT CHANGE UP (ref 27–34)
MCHC RBC-ENTMCNC: 33.5 GM/DL — SIGNIFICANT CHANGE UP (ref 32–36)
MCHC RBC-ENTMCNC: 33.5 GM/DL — SIGNIFICANT CHANGE UP (ref 32–36)
MCV RBC AUTO: 91.5 FL — SIGNIFICANT CHANGE UP (ref 80–100)
MCV RBC AUTO: 91.5 FL — SIGNIFICANT CHANGE UP (ref 80–100)
PLATELET # BLD AUTO: 264 K/UL — SIGNIFICANT CHANGE UP (ref 150–400)
PLATELET # BLD AUTO: 264 K/UL — SIGNIFICANT CHANGE UP (ref 150–400)
POTASSIUM SERPL-MCNC: 4.5 MMOL/L — SIGNIFICANT CHANGE UP (ref 3.5–5.3)
POTASSIUM SERPL-MCNC: 4.5 MMOL/L — SIGNIFICANT CHANGE UP (ref 3.5–5.3)
POTASSIUM SERPL-SCNC: 4.5 MMOL/L — SIGNIFICANT CHANGE UP (ref 3.5–5.3)
POTASSIUM SERPL-SCNC: 4.5 MMOL/L — SIGNIFICANT CHANGE UP (ref 3.5–5.3)
PROTHROM AB SERPL-ACNC: 10.6 SEC — SIGNIFICANT CHANGE UP (ref 9.5–13)
PROTHROM AB SERPL-ACNC: 10.6 SEC — SIGNIFICANT CHANGE UP (ref 9.5–13)
RBC # BLD: 4.11 M/UL — LOW (ref 4.2–5.8)
RBC # BLD: 4.11 M/UL — LOW (ref 4.2–5.8)
RBC # FLD: 12.6 % — SIGNIFICANT CHANGE UP (ref 10.3–14.5)
RBC # FLD: 12.6 % — SIGNIFICANT CHANGE UP (ref 10.3–14.5)
SODIUM SERPL-SCNC: 136 MMOL/L — SIGNIFICANT CHANGE UP (ref 135–145)
SODIUM SERPL-SCNC: 136 MMOL/L — SIGNIFICANT CHANGE UP (ref 135–145)
WBC # BLD: 8.32 K/UL — SIGNIFICANT CHANGE UP (ref 3.8–10.5)
WBC # BLD: 8.32 K/UL — SIGNIFICANT CHANGE UP (ref 3.8–10.5)
WBC # FLD AUTO: 8.32 K/UL — SIGNIFICANT CHANGE UP (ref 3.8–10.5)
WBC # FLD AUTO: 8.32 K/UL — SIGNIFICANT CHANGE UP (ref 3.8–10.5)

## 2023-11-01 PROCEDURE — 93005 ELECTROCARDIOGRAM TRACING: CPT

## 2023-11-01 PROCEDURE — 93010 ELECTROCARDIOGRAM REPORT: CPT

## 2023-11-01 PROCEDURE — G0463: CPT

## 2023-11-01 NOTE — H&P PST ADULT - HISTORY OF PRESENT ILLNESS
58-year-old male with bilateral knee pain left worse than right. He has end-stage knee osteoarthritis and he had cortisone injection which gave him good relief. He is under the care of a pain management taking oxycodone 30 mg for back pain and knee pain.  He has trouble with long standing walking and climbing stairs.  Pt had history of sleep apnea but after he lost some weight it went away he is not using CPAP or BiPAP. He is schedueld for a Left Knee Total joint Replacment by dr. Prater on 11/21/23.       58-year-old male with PMH of AISHA ( patient states that he resolved after 5o lbs weight loss) HTN, Gout here for PST with bilateral knee pain left worse than right. He has end-stage knee osteoarthritis and he had cortisone injection which gave him good relief. He is under the care of a pain management taking oxycodone 30 mg for back pain and knee pain. He has trouble with long standing walking and climbing stairs and reports 8/10 pain with most of the activities, Pt had history of sleep apnea but after he lost some weight it went away he is not using CPAP or BiPAP. He is scheduled for a Left Knee Total joint Replacement by dr. Prater on 11/21/23. Medical and cardiac clearance pending       58-year-old male with PMH of AISHA ( patient states that he resolved after 50 lbs weight loss) HTN, Gout here for PST with bilateral knee pain left worse than right. He has end-stage knee osteoarthritis and he had cortisone injection which gave him good relief. He is under the care of a pain management taking oxycodone 30 mg for back pain and knee pain. He has trouble with long standing walking and climbing stairs and reports 8/10 pain with most of the activities, Pt had history of sleep apnea but after he lost some weight it went away he is not using CPAP or BiPAP. He is scheduled for a Left Knee Total joint Replacement by dr. Prater on 11/21/23. Medical and cardiac clearance pending

## 2023-11-01 NOTE — CHART NOTE - NSCHARTNOTEFT_GEN_A_CORE
PDI	First Name	Last Name	Birth Date	Gender	Street Address	[facility] Code  A	Tawanda Mcbride	1965	Male	[address]	[facility]	[id number]  B	Tawanda Mcbride	1965	Male	[address]	[facility]	[id number]    Prescription Information      PDI Filter:    PDI	My Rx	Current Rx	Drug Type	Rx Written	Rx Dispensed	Drug	Quantity	Days Supply	Prescriber Name	Prescriber SAÚL #	Payment Method	Dispenser  A	N	N	O	11/04/2022	11/11/2022	oxycodone hcl (ir) 20 mg tab	120	30	Yang Jones MD	[id number]	Insurance	[facility] [id number]  B	N	N	O	10/03/2023	10/03/2023	oxycodone hcl (ir) 30 mg tab	180	23	HalSchuyler alarcon	[id number]	[facility] #[id number]  B	N	N	O	09/06/2023	09/06/2023	oxycodone hcl (ir) 30 mg tab	180	23	HalSchuyler alarcon	[id number]	[facility] #[id number]  B	N	N	O	08/07/2023	08/08/2023	oxycodone hcl (ir) 30 mg tab	180	23	HalSchuyler alarcon	[id number]	[facility] #[id number]  B	N	N	O	07/13/2023	07/13/2023	oxycodone hcl (ir) 30 mg tab	180	22	HalSchuyler alarcon	[id number]	[facility] #[id number]  B	N	N	O	06/15/2023	06/15/2023	oxycodone hcl (ir) 30 mg tab	180	30	Schuyler Gauthier	[id number]	[facility] #[id number]  B	N	N	O	05/09/2023	05/12/2023	oxycodone hcl (ir) 30 mg tab	180	23	HalSchuyler alarcon	[id number]	[facility] #[id number]  B	N	N	O	04/06/2023	04/06/2023	oxycodone hcl (ir) 30 mg tab	180	30	Schuyler Gauthier	[id number]	[facility] #[id number]  B	N	N	O	03/09/2023	03/09/2023	oxycodone hcl (ir) 30 mg tab	180	23	\[facility]\""Schuyler alarcon	[id number]	[facility] #[id number]  B	N	N	O	02/10/2023	02/10/2023	oxycodone hcl (ir) 30 mg tab	180	23	Schuyler Gauthier	[id number]	[facility] #[id number]  B	N	N	O	01/09/2023	01/09/2023	oxycodone hcl (ir) 30 mg tab	180	23	Schuyler Gauthier	[id number]	[facility] #[id number]  B	N	N	O	12/09/2022	12/09/2022	oxycodone hcl (ir) 30 mg tab	180	23	Schuyler Gauthier	[id number]	[facility] #[id number]  B	N	N	O	11/11/2022	11/11/2022	oxycodone hcl (ir) 30 mg tab	180	22	Schuyler Gauthier	[id number]	[facility] #[id number]

## 2023-11-01 NOTE — H&P PST ADULT - EKG AND INTERPRETATION
EKG reviewed, no acute changes, official reading pending. NSR at 72,EKG reviewed, no acute changes, official reading pending. cardiac clearance pending

## 2023-11-01 NOTE — H&P PST ADULT - ASSESSMENT
medications reviewed, instructions given on what medications to take and what not to take. ERP teaching given and the pt verbalized understanding.     ADALI VTE 2.0 SCORE [CLOT updated 2019]    AGE RELATED RISK FACTORS                                                       MOBILITY RELATED FACTORS  [ ] Age 41-60 years                                            (1 Point)                    [ ] Bed rest                                                        (1 Point)  [ ] Age: 61-74 years                                           (2 Points)                  [ ] Plaster cast                                                   (2 Points)  [ ] Age= 75 years                                              (3 Points)                    [ ] Bed bound for more than 72 hours                 (2 Points)    DISEASE RELATED RISK FACTORS                                               GENDER SPECIFIC FACTORS  [ ] Edema in the lower extremities                       (1 Point)              [ ] Pregnancy                                                     (1 Point)  [ ] Varicose veins                                               (1 Point)                     [ ] Post-partum < 6 weeks                                   (1 Point)             [ ] BMI > 25 Kg/m2                                            (1 Point)                     [ ] Hormonal therapy  or oral contraception          (1 Point)                 [ ] Sepsis (in the previous month)                        (1 Point)               [ ] History of pregnancy complications                 (1 point)  [ ] Pneumonia or serious lung disease                                               [ ] Unexplained or recurrent                     (1 Point)           (in the previous month)                               (1 Point)  [ ] Abnormal pulmonary function test                     (1 Point)                 SURGERY RELATED RISK FACTORS  [ ] Acute myocardial infarction                              (1 Point)               [ ]  Section                                             (1 Point)  [ ] Congestive heart failure (in the previous month)  (1 Point)      [ ] Minor surgery                                                  (1 Point)   [ ] Inflammatory bowel disease                             (1 Point)               [ ] Arthroscopic surgery                                        (2 Points)  [ ] Central venous access                                      (2 Points)                [ ] General surgery lasting more than 45 minutes (2 points)  [ ] Malignancy- Present or previous                   (2 Points)                [ ] Elective arthroplasty                                         (5 points)    [ ] Stroke (in the previous month)                          (5 Points)                                                                                                                                                           HEMATOLOGY RELATED FACTORS                                                 TRAUMA RELATED RISK FACTORS  [ ] Prior episodes of VTE                                     (3 Points)                [ ] Fracture of the hip, pelvis, or leg                       (5 Points)  [ ] Positive family history for VTE                         (3 Points)             [ ] Acute spinal cord injury (in the previous month)  (5 Points)  [ ] Prothrombin 89331 A                                     (3 Points)               [ ] Paralysis  (less than 1 month)                             (5 Points)  [ ] Factor V Leiden                                             (3 Points)                  [ ] Multiple Trauma within 1 month                        (5 Points)  [ ] Lupus anticoagulants                                     (3 Points)                                                           [ ] Anticardiolipin antibodies                               (3 Points)                                                       [ ] High homocysteine in the blood                      (3 Points)                                             [ ] Other congenital or acquired thrombophilia      (3 Points)                                                [ ] Heparin induced thrombocytopenia                  (3 Points)                                     Total Score [          ]  OPIOID RISK TOOL    SHIRA EACH BOX THAT APPLIES AND ADD TOTALS AT THE END    FAMILY HISTORY OF SUBSTANCE ABUSE                 FEMALE         MALE                                                Alcohol                             [  ]1 pt          [  ]3pts                                               Illegal Drugs                     [  ]2 pts        [  ]3pts                                               Rx Drugs                           [  ]4 pts        [  ]4 pts    PERSONAL HISTORY OF SUBSTANCE ABUSE                                                                                          Alcohol                             [  ]3 pts       [  ]3 pts                                               Illegal Drugs                     [  ]4 pts        [  ]4 pts                                               Rx Drugs                           [  ]5 pts        [  ]5 pts    AGE BETWEEN 16-45 YEARS                                      [  ]1 pt         [  ]1 pt    HISTORY OF PREADOLESCENT   SEXUAL ABUSE                                                             [  ]3 pts        [  ]0pts    PSYCHOLOGICAL DISEASE                     ADD, OCD, Bipolar, Schizophrenia        [  ]2 pts         [  ]2 pts                      Depression                                               [  ]1 pt           [  ]1 pt           SCORING TOTAL   (add numbers and type here)              ( 0)                                     A score of 3 or lower indicated LOW risk for future opioid abuse  A score of 4 to 7 indicated moderate risk for future opioid abuse  A score of 8 or higher indicates a high risk for opioid abuse 58-year-old male with PMH of AISHA ( patient states that he resolved after 50 lbs weight loss) HTN, Gout here for PST with bilateral knee pain left worse than right. He has end-stage knee osteoarthritis and he had cortisone injection which gave him good relief. He is under the care of a pain management taking oxycodone 30 mg for back pain and knee pain. He has trouble with long standing walking and climbing stairs and reports 8/10 pain with most of the activities, Pt had history of sleep apnea but after he lost some weight it went away he is not using CPAP or BiPAP. He is scheduled for a Left Knee Total joint Replacement by dr. Prater on 23. Medical and cardiac clearance pending        medications reviewed, instructions given on what medications to take and what not to take. ERP teaching given and the pt verbalized understanding. He can take Oxycodone in the AM of surgery, Last dose of Olmesartan in on 23 AM. All other meds can be continued till the night before surgery. pt is not taking ASA/Plavix/Anticoagulation medication at this time. Asked the pt not to take any NSAID's 5-7 days before surgery and told the pt Tylenol is okay to take for pain, pt verbalized understanding.     CAPRINI VTE 2.0 SCORE [CLOT updated 2019]    AGE RELATED RISK FACTORS                                                       MOBILITY RELATED FACTORS  [ x] Age 41-60 years                                            (1 Point)                    [ ] Bed rest                                                        (1 Point)  [ ] Age: 61-74 years                                           (2 Points)                  [ ] Plaster cast                                                   (2 Points)  [ ] Age= 75 years                                              (3 Points)                    [ ] Bed bound for more than 72 hours                 (2 Points)    DISEASE RELATED RISK FACTORS                                               GENDER SPECIFIC FACTORS  [x ] Edema in the lower extremities                       (1 Point)              [ ] Pregnancy                                                     (1 Point)  [ ] Varicose veins                                               (1 Point)                     [ ] Post-partum < 6 weeks                                   (1 Point)             [x ] BMI > 25 Kg/m2                                            (1 Point)                     [ ] Hormonal therapy  or oral contraception          (1 Point)                 [ ] Sepsis (in the previous month)                        (1 Point)               [ ] History of pregnancy complications                 (1 point)  [ ] Pneumonia or serious lung disease                                               [ ] Unexplained or recurrent                     (1 Point)           (in the previous month)                               (1 Point)  [ ] Abnormal pulmonary function test                     (1 Point)                 SURGERY RELATED RISK FACTORS  [ ] Acute myocardial infarction                              (1 Point)               [ ]  Section                                             (1 Point)  [ ] Congestive heart failure (in the previous month)  (1 Point)      [ ] Minor surgery                                                  (1 Point)   [ ] Inflammatory bowel disease                             (1 Point)               [ ] Arthroscopic surgery                                        (2 Points)  [ ] Central venous access                                      (2 Points)                [ ] General surgery lasting more than 45 minutes (2 points)  [ ] Malignancy- Present or previous                   (2 Points)                [x ] Elective arthroplasty                                         (5 points)    [ ] Stroke (in the previous month)                          (5 Points)                                                                                                                                                           HEMATOLOGY RELATED FACTORS                                                 TRAUMA RELATED RISK FACTORS  [ ] Prior episodes of VTE                                     (3 Points)                [ ] Fracture of the hip, pelvis, or leg                       (5 Points)  [ ] Positive family history for VTE                         (3 Points)             [ ] Acute spinal cord injury (in the previous month)  (5 Points)  [ ] Prothrombin 02955 A                                     (3 Points)               [ ] Paralysis  (less than 1 month)                             (5 Points)  [ ] Factor V Leiden                                             (3 Points)                  [ ] Multiple Trauma within 1 month                        (5 Points)  [ ] Lupus anticoagulants                                     (3 Points)                                                           [ ] Anticardiolipin antibodies                               (3 Points)                                                       [ ] High homocysteine in the blood                      (3 Points)                                             [ ] Other congenital or acquired thrombophilia      (3 Points)                                                [ ] Heparin induced thrombocytopenia                  (3 Points)                                     Total Score [   8       ]  OPIOID RISK TOOL    SHIRA EACH BOX THAT APPLIES AND ADD TOTALS AT THE END    FAMILY HISTORY OF SUBSTANCE ABUSE                 FEMALE         MALE                                                Alcohol                             [  ]1 pt          [  ]3pts                                               Illegal Drugs                     [  ]2 pts        [  ]3pts                                               Rx Drugs                           [  ]4 pts        [  ]4 pts    PERSONAL HISTORY OF SUBSTANCE ABUSE                                                                                          Alcohol                             [  ]3 pts       [  ]3 pts                                               Illegal Drugs                     [  ]4 pts        [  ]4 pts                                               Rx Drugs                           [  ]5 pts        [  ]5 pts    AGE BETWEEN 16-45 YEARS                                      [  ]1 pt         [  ]1 pt    HISTORY OF PREADOLESCENT   SEXUAL ABUSE                                                             [  ]3 pts        [  ]0pts    PSYCHOLOGICAL DISEASE                     ADD, OCD, Bipolar, Schizophrenia        [  ]2 pts         [  ]2 pts                      Depression                                               [  ]1 pt           [  ]1 pt           SCORING TOTAL   (add numbers and type here)              ( 0)                                     A score of 3 or lower indicated LOW risk for future opioid abuse  A score of 4 to 7 indicated moderate risk for future opioid abuse  A score of 8 or higher indicates a high risk for opioid abuse

## 2023-11-01 NOTE — H&P PST ADULT - MUSCULOSKELETAL
details… normal/ROM intact/normal gait/strength 5/5 bilateral upper extremities/strength 5/5 bilateral lower extremities decreased ROM/decreased ROM due to pain/joint swelling/decreased strength/abnormal gait

## 2023-11-02 LAB
MRSA PCR RESULT.: SIGNIFICANT CHANGE UP
MRSA PCR RESULT.: SIGNIFICANT CHANGE UP
S AUREUS DNA NOSE QL NAA+PROBE: DETECTED
S AUREUS DNA NOSE QL NAA+PROBE: DETECTED

## 2023-11-20 ENCOUNTER — TRANSCRIPTION ENCOUNTER (OUTPATIENT)
Age: 58
End: 2023-11-20

## 2023-11-21 ENCOUNTER — INPATIENT (INPATIENT)
Facility: HOSPITAL | Age: 58
LOS: 0 days | Discharge: HOME HEALTH | DRG: 470 | End: 2023-11-22
Attending: ORTHOPAEDIC SURGERY | Admitting: ORTHOPAEDIC SURGERY
Payer: COMMERCIAL

## 2023-11-21 ENCOUNTER — TRANSCRIPTION ENCOUNTER (OUTPATIENT)
Age: 58
End: 2023-11-21

## 2023-11-21 ENCOUNTER — APPOINTMENT (OUTPATIENT)
Dept: ORTHOPEDIC SURGERY | Facility: HOSPITAL | Age: 58
End: 2023-11-21

## 2023-11-21 VITALS
TEMPERATURE: 97 F | RESPIRATION RATE: 16 BRPM | SYSTOLIC BLOOD PRESSURE: 124 MMHG | HEIGHT: 74 IN | WEIGHT: 315 LBS | HEART RATE: 80 BPM | DIASTOLIC BLOOD PRESSURE: 66 MMHG

## 2023-11-21 DIAGNOSIS — M17.12 UNILATERAL PRIMARY OSTEOARTHRITIS, LEFT KNEE: ICD-10-CM

## 2023-11-21 DIAGNOSIS — Z98.890 OTHER SPECIFIED POSTPROCEDURAL STATES: Chronic | ICD-10-CM

## 2023-11-21 LAB
BLD GP AB SCN SERPL QL: SIGNIFICANT CHANGE UP
BLD GP AB SCN SERPL QL: SIGNIFICANT CHANGE UP
GLUCOSE BLDC GLUCOMTR-MCNC: 105 MG/DL — HIGH (ref 70–99)
GLUCOSE BLDC GLUCOMTR-MCNC: 105 MG/DL — HIGH (ref 70–99)
GLUCOSE BLDC GLUCOMTR-MCNC: 118 MG/DL — HIGH (ref 70–99)
GLUCOSE BLDC GLUCOMTR-MCNC: 118 MG/DL — HIGH (ref 70–99)
GLUCOSE BLDC GLUCOMTR-MCNC: 129 MG/DL — HIGH (ref 70–99)
GLUCOSE BLDC GLUCOMTR-MCNC: 129 MG/DL — HIGH (ref 70–99)
GLUCOSE BLDC GLUCOMTR-MCNC: 141 MG/DL — HIGH (ref 70–99)
GLUCOSE BLDC GLUCOMTR-MCNC: 141 MG/DL — HIGH (ref 70–99)
GLUCOSE BLDC GLUCOMTR-MCNC: 174 MG/DL — HIGH (ref 70–99)
GLUCOSE BLDC GLUCOMTR-MCNC: 174 MG/DL — HIGH (ref 70–99)

## 2023-11-21 PROCEDURE — 27447 TOTAL KNEE ARTHROPLASTY: CPT | Mod: LT

## 2023-11-21 PROCEDURE — 73560 X-RAY EXAM OF KNEE 1 OR 2: CPT | Mod: 26,LT

## 2023-11-21 PROCEDURE — 20985 CPTR-ASST DIR MS PX: CPT

## 2023-11-21 PROCEDURE — 27447 TOTAL KNEE ARTHROPLASTY: CPT | Mod: AS,LT

## 2023-11-21 PROCEDURE — 99222 1ST HOSP IP/OBS MODERATE 55: CPT

## 2023-11-21 DEVICE — IMPLANTABLE DEVICE: Type: IMPLANTABLE DEVICE | Site: LEFT | Status: FUNCTIONAL

## 2023-11-21 DEVICE — COMP PATELLA TRI-PEG E-PLUS POLY 9X38MM: Type: IMPLANTABLE DEVICE | Site: LEFT | Status: FUNCTIONAL

## 2023-11-21 DEVICE — INSERT TIB NONPOROUS UNIV SZ 10 LT: Type: IMPLANTABLE DEVICE | Site: LEFT | Status: FUNCTIONAL

## 2023-11-21 DEVICE — GUIDE PIN SCREW ORTHO STR FLUTED: Type: IMPLANTABLE DEVICE | Site: LEFT | Status: FUNCTIONAL

## 2023-11-21 DEVICE — GUIDE PIN SCREW ORTHO THRD SQ HEADED: Type: IMPLANTABLE DEVICE | Site: LEFT | Status: FUNCTIONAL

## 2023-11-21 DEVICE — STEM MOD UNIV TIB 12X40MM: Type: IMPLANTABLE DEVICE | Site: LEFT | Status: FUNCTIONAL

## 2023-11-21 DEVICE — GUIDE PIN/SCREW ORTHO 3.2 X 37MM: Type: IMPLANTABLE DEVICE | Site: LEFT | Status: FUNCTIONAL

## 2023-11-21 DEVICE — COMP FEM NON POROUS SZ 10 LT: Type: IMPLANTABLE DEVICE | Site: LEFT | Status: FUNCTIONAL

## 2023-11-21 DEVICE — CEMENT PALACOS R: Type: IMPLANTABLE DEVICE | Site: LEFT | Status: FUNCTIONAL

## 2023-11-21 RX ORDER — GLUCAGON INJECTION, SOLUTION 0.5 MG/.1ML
1 INJECTION, SOLUTION SUBCUTANEOUS ONCE
Refills: 0 | Status: DISCONTINUED | OUTPATIENT
Start: 2023-11-21 | End: 2023-11-22

## 2023-11-21 RX ORDER — SODIUM CHLORIDE 9 MG/ML
1000 INJECTION INTRAMUSCULAR; INTRAVENOUS; SUBCUTANEOUS
Refills: 0 | Status: DISCONTINUED | OUTPATIENT
Start: 2023-11-21 | End: 2023-11-22

## 2023-11-21 RX ORDER — CELECOXIB 200 MG/1
400 CAPSULE ORAL ONCE
Refills: 0 | Status: DISCONTINUED | OUTPATIENT
Start: 2023-11-21 | End: 2023-11-21

## 2023-11-21 RX ORDER — SENNA PLUS 8.6 MG/1
2 TABLET ORAL AT BEDTIME
Refills: 0 | Status: DISCONTINUED | OUTPATIENT
Start: 2023-11-21 | End: 2023-11-22

## 2023-11-21 RX ORDER — SODIUM CHLORIDE 9 MG/ML
500 INJECTION INTRAMUSCULAR; INTRAVENOUS; SUBCUTANEOUS ONCE
Refills: 0 | Status: COMPLETED | OUTPATIENT
Start: 2023-11-21 | End: 2023-11-21

## 2023-11-21 RX ORDER — ALLOPURINOL 300 MG
300 TABLET ORAL DAILY
Refills: 0 | Status: DISCONTINUED | OUTPATIENT
Start: 2023-11-21 | End: 2023-11-22

## 2023-11-21 RX ORDER — CEFAZOLIN SODIUM 1 G
2000 VIAL (EA) INJECTION
Refills: 0 | Status: DISCONTINUED | OUTPATIENT
Start: 2023-11-21 | End: 2023-11-21

## 2023-11-21 RX ORDER — SODIUM CHLORIDE 9 MG/ML
1000 INJECTION, SOLUTION INTRAVENOUS
Refills: 0 | Status: DISCONTINUED | OUTPATIENT
Start: 2023-11-21 | End: 2023-11-22

## 2023-11-21 RX ORDER — CEFAZOLIN SODIUM 1 G
2000 VIAL (EA) INJECTION ONCE
Refills: 0 | Status: DISCONTINUED | OUTPATIENT
Start: 2023-11-21 | End: 2023-11-21

## 2023-11-21 RX ORDER — ONDANSETRON 8 MG/1
4 TABLET, FILM COATED ORAL EVERY 6 HOURS
Refills: 0 | Status: DISCONTINUED | OUTPATIENT
Start: 2023-11-21 | End: 2023-11-22

## 2023-11-21 RX ORDER — ACETAMINOPHEN 500 MG
975 TABLET ORAL ONCE
Refills: 0 | Status: DISCONTINUED | OUTPATIENT
Start: 2023-11-21 | End: 2023-11-21

## 2023-11-21 RX ORDER — ACETAMINOPHEN 500 MG
975 TABLET ORAL ONCE
Refills: 0 | Status: COMPLETED | OUTPATIENT
Start: 2023-11-21 | End: 2023-11-21

## 2023-11-21 RX ORDER — SODIUM CHLORIDE 9 MG/ML
500 INJECTION, SOLUTION INTRAVENOUS ONCE
Refills: 0 | Status: COMPLETED | OUTPATIENT
Start: 2023-11-21 | End: 2023-11-21

## 2023-11-21 RX ORDER — HYDROMORPHONE HYDROCHLORIDE 2 MG/ML
0.5 INJECTION INTRAMUSCULAR; INTRAVENOUS; SUBCUTANEOUS
Refills: 0 | Status: DISCONTINUED | OUTPATIENT
Start: 2023-11-21 | End: 2023-11-21

## 2023-11-21 RX ORDER — OXYCODONE HYDROCHLORIDE 5 MG/1
20 TABLET ORAL EVERY 4 HOURS
Refills: 0 | Status: DISCONTINUED | OUTPATIENT
Start: 2023-11-21 | End: 2023-11-22

## 2023-11-21 RX ORDER — ONDANSETRON 8 MG/1
4 TABLET, FILM COATED ORAL ONCE
Refills: 0 | Status: DISCONTINUED | OUTPATIENT
Start: 2023-11-21 | End: 2023-11-21

## 2023-11-21 RX ORDER — KETOROLAC TROMETHAMINE 30 MG/ML
15 SYRINGE (ML) INJECTION EVERY 6 HOURS
Refills: 0 | Status: COMPLETED | OUTPATIENT
Start: 2023-11-21 | End: 2023-11-22

## 2023-11-21 RX ORDER — APREPITANT 80 MG/1
40 CAPSULE ORAL ONCE
Refills: 0 | Status: DISCONTINUED | OUTPATIENT
Start: 2023-11-21 | End: 2023-11-21

## 2023-11-21 RX ORDER — TRANEXAMIC ACID 100 MG/ML
1000 INJECTION, SOLUTION INTRAVENOUS ONCE
Refills: 0 | Status: DISCONTINUED | OUTPATIENT
Start: 2023-11-21 | End: 2023-11-21

## 2023-11-21 RX ORDER — CEFAZOLIN SODIUM 1 G
3000 VIAL (EA) INJECTION ONCE
Refills: 0 | Status: DISCONTINUED | OUTPATIENT
Start: 2023-11-21 | End: 2023-11-21

## 2023-11-21 RX ORDER — FENTANYL CITRATE 50 UG/ML
50 INJECTION INTRAVENOUS
Refills: 0 | Status: DISCONTINUED | OUTPATIENT
Start: 2023-11-21 | End: 2023-11-21

## 2023-11-21 RX ORDER — DEXTROSE 50 % IN WATER 50 %
25 SYRINGE (ML) INTRAVENOUS ONCE
Refills: 0 | Status: DISCONTINUED | OUTPATIENT
Start: 2023-11-21 | End: 2023-11-22

## 2023-11-21 RX ORDER — SODIUM CHLORIDE 9 MG/ML
1000 INJECTION, SOLUTION INTRAVENOUS
Refills: 0 | Status: DISCONTINUED | OUTPATIENT
Start: 2023-11-21 | End: 2023-11-21

## 2023-11-21 RX ORDER — DEXTROSE 50 % IN WATER 50 %
12.5 SYRINGE (ML) INTRAVENOUS ONCE
Refills: 0 | Status: DISCONTINUED | OUTPATIENT
Start: 2023-11-21 | End: 2023-11-22

## 2023-11-21 RX ORDER — ASPIRIN/CALCIUM CARB/MAGNESIUM 324 MG
81 TABLET ORAL
Refills: 0 | Status: DISCONTINUED | OUTPATIENT
Start: 2023-11-21 | End: 2023-11-22

## 2023-11-21 RX ORDER — APREPITANT 80 MG/1
40 CAPSULE ORAL ONCE
Refills: 0 | Status: COMPLETED | OUTPATIENT
Start: 2023-11-21 | End: 2023-11-21

## 2023-11-21 RX ORDER — POLYETHYLENE GLYCOL 3350 17 G/17G
17 POWDER, FOR SOLUTION ORAL AT BEDTIME
Refills: 0 | Status: DISCONTINUED | OUTPATIENT
Start: 2023-11-21 | End: 2023-11-22

## 2023-11-21 RX ORDER — INSULIN LISPRO 100/ML
VIAL (ML) SUBCUTANEOUS
Refills: 0 | Status: DISCONTINUED | OUTPATIENT
Start: 2023-11-21 | End: 2023-11-22

## 2023-11-21 RX ORDER — LOSARTAN POTASSIUM 100 MG/1
100 TABLET, FILM COATED ORAL DAILY
Refills: 0 | Status: DISCONTINUED | OUTPATIENT
Start: 2023-11-23 | End: 2023-11-22

## 2023-11-21 RX ORDER — OLMESARTAN MEDOXOMIL 5 MG/1
1 TABLET, FILM COATED ORAL
Refills: 0 | DISCHARGE

## 2023-11-21 RX ORDER — DEXTROSE 50 % IN WATER 50 %
15 SYRINGE (ML) INTRAVENOUS ONCE
Refills: 0 | Status: DISCONTINUED | OUTPATIENT
Start: 2023-11-21 | End: 2023-11-22

## 2023-11-21 RX ORDER — CELECOXIB 200 MG/1
400 CAPSULE ORAL ONCE
Refills: 0 | Status: COMPLETED | OUTPATIENT
Start: 2023-11-21 | End: 2023-11-21

## 2023-11-21 RX ORDER — CEFAZOLIN SODIUM 1 G
2000 VIAL (EA) INJECTION
Refills: 0 | Status: COMPLETED | OUTPATIENT
Start: 2023-11-21 | End: 2023-11-21

## 2023-11-21 RX ORDER — ACETAMINOPHEN 500 MG
1000 TABLET ORAL ONCE
Refills: 0 | Status: COMPLETED | OUTPATIENT
Start: 2023-11-21 | End: 2023-11-22

## 2023-11-21 RX ORDER — SODIUM CHLORIDE 9 MG/ML
3 INJECTION INTRAMUSCULAR; INTRAVENOUS; SUBCUTANEOUS EVERY 8 HOURS
Refills: 0 | Status: DISCONTINUED | OUTPATIENT
Start: 2023-11-21 | End: 2023-11-21

## 2023-11-21 RX ORDER — PANTOPRAZOLE SODIUM 20 MG/1
40 TABLET, DELAYED RELEASE ORAL
Refills: 0 | Status: DISCONTINUED | OUTPATIENT
Start: 2023-11-21 | End: 2023-11-22

## 2023-11-21 RX ORDER — OXYCODONE HYDROCHLORIDE 5 MG/1
10 TABLET ORAL EVERY 4 HOURS
Refills: 0 | Status: DISCONTINUED | OUTPATIENT
Start: 2023-11-21 | End: 2023-11-22

## 2023-11-21 RX ORDER — ACETAMINOPHEN 500 MG
975 TABLET ORAL EVERY 8 HOURS
Refills: 0 | Status: DISCONTINUED | OUTPATIENT
Start: 2023-11-21 | End: 2023-11-22

## 2023-11-21 RX ORDER — MAGNESIUM HYDROXIDE 400 MG/1
30 TABLET, CHEWABLE ORAL DAILY
Refills: 0 | Status: DISCONTINUED | OUTPATIENT
Start: 2023-11-21 | End: 2023-11-22

## 2023-11-21 RX ORDER — ALLOPURINOL 300 MG
1 TABLET ORAL
Refills: 0 | DISCHARGE

## 2023-11-21 RX ORDER — CELECOXIB 200 MG/1
200 CAPSULE ORAL EVERY 12 HOURS
Refills: 0 | Status: DISCONTINUED | OUTPATIENT
Start: 2023-11-23 | End: 2023-11-22

## 2023-11-21 RX ADMIN — APREPITANT 40 MILLIGRAM(S): 80 CAPSULE ORAL at 08:40

## 2023-11-21 RX ADMIN — Medication 2000 MILLIGRAM(S): at 17:55

## 2023-11-21 RX ADMIN — Medication 15 MILLIGRAM(S): at 23:54

## 2023-11-21 RX ADMIN — SENNA PLUS 2 TABLET(S): 8.6 TABLET ORAL at 22:54

## 2023-11-21 RX ADMIN — Medication 15 MILLIGRAM(S): at 18:47

## 2023-11-21 RX ADMIN — Medication 975 MILLIGRAM(S): at 22:54

## 2023-11-21 RX ADMIN — Medication 81 MILLIGRAM(S): at 17:46

## 2023-11-21 RX ADMIN — OXYCODONE HYDROCHLORIDE 20 MILLIGRAM(S): 5 TABLET ORAL at 23:55

## 2023-11-21 RX ADMIN — Medication 975 MILLIGRAM(S): at 23:54

## 2023-11-21 RX ADMIN — CELECOXIB 400 MILLIGRAM(S): 200 CAPSULE ORAL at 08:41

## 2023-11-21 RX ADMIN — Medication 975 MILLIGRAM(S): at 08:40

## 2023-11-21 RX ADMIN — OXYCODONE HYDROCHLORIDE 20 MILLIGRAM(S): 5 TABLET ORAL at 22:55

## 2023-11-21 RX ADMIN — SODIUM CHLORIDE 500 MILLILITER(S): 9 INJECTION INTRAMUSCULAR; INTRAVENOUS; SUBCUTANEOUS at 14:40

## 2023-11-21 RX ADMIN — OXYCODONE HYDROCHLORIDE 20 MILLIGRAM(S): 5 TABLET ORAL at 17:46

## 2023-11-21 RX ADMIN — SODIUM CHLORIDE 1000 MILLILITER(S): 9 INJECTION, SOLUTION INTRAVENOUS at 15:45

## 2023-11-21 RX ADMIN — Medication 15 MILLIGRAM(S): at 22:54

## 2023-11-21 RX ADMIN — Medication 2000 MILLIGRAM(S): at 22:54

## 2023-11-21 RX ADMIN — Medication 15 MILLIGRAM(S): at 17:47

## 2023-11-21 RX ADMIN — OXYCODONE HYDROCHLORIDE 20 MILLIGRAM(S): 5 TABLET ORAL at 18:46

## 2023-11-21 NOTE — PATIENT PROFILE ADULT - PUBLIC BENEFITS
amlodipine      Last Written Prescription Date:  06/19/19  Last Fill Quantity: 30,   # refills: 1  Last Office Visit: 06/19/  Future Office visit:         
no

## 2023-11-21 NOTE — DISCHARGE NOTE PROVIDER - NSDCMRMEDTOKEN_GEN_ALL_CORE_FT
allopurinol 300 mg oral tablet: 1 tab(s) orally once a day  olmesartan 40 mg oral tablet: 1 tab(s) orally once a day  oxyCODONE 20 mg oral tablet: 1 tab(s) orally 4 times a day   acetaminophen 325 mg oral tablet: 3 tab(s) orally every 8 hours  allopurinol 300 mg oral tablet: 1 tab(s) orally once a day  aspirin 81 mg oral delayed release tablet: 1 tab(s) orally 2 times a day  CeleBREX 200 mg oral capsule: 1 cap(s) orally 2 times a day  olmesartan 40 mg oral tablet: 1 tab(s) orally once a day  pantoprazole 40 mg oral delayed release tablet: 1 tab(s) orally once a day  Senna S 50 mg-8.6 mg oral tablet: 2 tab(s) orally once a day (at bedtime)

## 2023-11-21 NOTE — DISCHARGE NOTE PROVIDER - CARE PROVIDERS DIRECT ADDRESSES
[email or web address]

## 2023-11-21 NOTE — CONSULT NOTE ADULT - SUBJECTIVE AND OBJECTIVE BOX
Patient is a 58y old  Male who presents with a chief complaint of Left total knee arthroplasty  (21 Nov 2023 13:46)      HPI:  58-year-old male with PMH of AISHA ( patient states that he resolved after 50 lbs weight loss) HTN, Gout here for PST with bilateral knee pain left worse than right. He has end-stage knee osteoarthritis and he had cortisone injection which gave him good relief. He is under the care of a pain management taking oxycodone 30 mg for back pain and knee pain. He has trouble with long standing walking and climbing stairs and reports 8/10 pain with most of the activities, Pt had history of sleep apnea but after he lost some weight it went away he is not using CPAP or BiPAP. He is scheduled for a Left Knee Total joint Replacement by dr. Prater on 11/21/23. Medical and cardiac clearance pending       (01 Nov 2023 15:36)            Analgesic Dosing for past 24 hours reviewed as below:    acetaminophen     Tablet ..   975 milliGRAM(s) Oral (11-21-23 @ 08:40)    aprepitant   40 milliGRAM(s) Oral (11-21-23 @ 08:40)    celecoxib   400 milliGRAM(s) Oral (11-21-23 @ 08:41)    ketorolac   Injectable   15 milliGRAM(s) IV Push (11-21-23 @ 17:47)    oxyCODONE    IR   20 milliGRAM(s) Oral (11-21-23 @ 17:46)          T(C): 36.3 (11-21-23 @ 17:11), Max: 36.3 (11-21-23 @ 16:00)  HR: 71 (11-21-23 @ 17:11) (62 - 80)  BP: 108/63 (11-21-23 @ 17:11) (93/73 - 124/66)  RR: 18 (11-21-23 @ 17:11) (12 - 20)  SpO2: 96% (11-21-23 @ 17:11) (96% - 100%)        acetaminophen     Tablet .. 975 milliGRAM(s) Oral every 8 hours  acetaminophen   IVPB .. 1000 milliGRAM(s) IV Intermittent once  allopurinol 300 milliGRAM(s) Oral daily  aluminum hydroxide/magnesium hydroxide/simethicone Suspension 30 milliLiter(s) Oral four times a day PRN  aspirin enteric coated 81 milliGRAM(s) Oral two times a day  ceFAZolin  Injectable. 2000 milliGRAM(s) IV Push <User Schedule>  dextrose 5%. 1000 milliLiter(s) IV Continuous <Continuous>  dextrose 5%. 1000 milliLiter(s) IV Continuous <Continuous>  dextrose 50% Injectable 25 Gram(s) IV Push once  dextrose 50% Injectable 12.5 Gram(s) IV Push once  dextrose 50% Injectable 25 Gram(s) IV Push once  dextrose Oral Gel 15 Gram(s) Oral once PRN  glucagon  Injectable 1 milliGRAM(s) IntraMuscular once  insulin lispro (ADMELOG) corrective regimen sliding scale   SubCutaneous three times a day before meals  ketorolac   Injectable 15 milliGRAM(s) IV Push every 6 hours  magnesium hydroxide Suspension 30 milliLiter(s) Oral daily PRN  ondansetron Injectable 4 milliGRAM(s) IV Push every 6 hours PRN  oxyCODONE    IR 20 milliGRAM(s) Oral every 4 hours PRN  oxyCODONE    IR 10 milliGRAM(s) Oral every 4 hours PRN  pantoprazole    Tablet 40 milliGRAM(s) Oral before breakfast  polyethylene glycol 3350 17 Gram(s) Oral at bedtime  senna 2 Tablet(s) Oral at bedtime  sodium chloride 0.9%. 1000 milliLiter(s) IV Continuous <Continuous>                  Pain Service   [phone]
58-y/o male with Hx of Morbid Obesity, AISHA not using any CPAP, HTN, Gout, he has bilateral knee pain left worse than right. He has end-stage knee osteoarthritis and he had cortisone injection which gave him good relief, he came in here for elective Left Knee Total joint Replacement by dr. Prater on 11/21/23 s/p procedure.      Allergies:  	shellfish: Food, Anaphylaxis  	penicillin: Drug, Rash     Past Medical, Past Surgical, and Family History:  PAST MEDICAL HISTORY:  Gout     HTN (hypertension)     Obstructive sleep apnea.     PAST SURGICAL HISTORY:  H/O arthroscopy of knee.     FAMILY HISTORY:  No pertinent family history in first degree relatives. No pertinent family history of: pt denies any  family history of DM, HTN, heart disease, seizures, cancer, heme disorders or auto immune disease.     Anesthesia History:  · Previous Reaction to Anesthesia	none  · Family History of Anesthesia Reaction/Malignant Hyperthermia	No  · Latex Allergy	No    Social History:     Not a smoker, drinker or using any drugs      Home Medications:   * Incomplete Medication History as of 01-Nov-2023 16:19 documented in Structured Notes  · 	allopurinol 300 mg oral tablet: Last Dose Taken:  , 1 tab(s) orally once a day  · 	oxyCODONE 20 mg oral tablet: Last Dose Taken:  , 1 tab(s) orally 4 times a day  · 	olmesartan 40 mg oral tablet: Last Dose Taken:  , 1 tab(s) orally once a day    Vital Signs Last 24 Hrs  T(C): 36.1 (21 Nov 2023 14:04), Max: 36.2 (21 Nov 2023 08:29)  T(F): 97 (21 Nov 2023 14:04), Max: 97.2 (21 Nov 2023 08:29)  HR: 68 (21 Nov 2023 14:45) (68 - 80)  BP: 113/82 (21 Nov 2023 14:45) (110/78 - 124/66)  RR: 14 (21 Nov 2023 14:45) (12 - 16)  SpO2: 100% (21 Nov 2023 14:45) (99% - 100%)    Parameters below as of 21 Nov 2023 14:45  Patient On (Oxygen Delivery Method): room air        PHYSICAL EXAM:    GENERAL: Middle age male looking comfortable   HEENT: PERRL, +EOMI  NECK: soft, Supple, No JVD  CHEST/LUNG: Clear to auscultate bilaterally; No wheezing  HEART: S1S2+, Regular rate and rhythm; No murmurs  ABDOMEN: Soft, Nontender, Nondistended; Bowel sounds present  EXTREMITIES:  1+ Peripheral Pulses, No edema, left Knee Joint area cover with dressing, no bleeding or soaking   SKIN: No rashes or lesions  NEURO: AAOX3  PSYCH: normal mood

## 2023-11-21 NOTE — PHYSICAL THERAPY INITIAL EVALUATION ADULT - ADDITIONAL COMMENTS
Pt reports living in private home with wife who is able to assist. Pt has 3 FELICITA with no handrail and 6+6 stairs inside with handrail. Independent prior to admission. Owns RW, cane, and raised toilet seat.

## 2023-11-21 NOTE — PATIENT PROFILE ADULT - FALL HARM RISK - HARM RISK INTERVENTIONS
Assistance with ambulation/Assistance OOB with selected safe patient handling equipment/Communicate Risk of Fall with Harm to all staff/Discuss with provider need for PT consult/Monitor gait and stability/Provide patient with walking aids - walker, cane, crutches/Reinforce activity limits and safety measures with patient and family/Sit up slowly, dangle for a short time, stand at bedside before walking/Tailored Fall Risk Interventions/Use of alarms - bed, chair and/or voice tab/Visual Cue: Yellow wristband and red socks/Bed in lowest position, wheels locked, appropriate side rails in place/Call bell, personal items and telephone in reach/Instruct patient to call for assistance before getting out of bed or chair/Non-slip footwear when patient is out of bed/[place] to call system/Physically safe environment - no spills, clutter or unnecessary equipment/Purposeful Proactive Rounding/Room/bathroom lighting operational, light cord in reach

## 2023-11-21 NOTE — DISCHARGE NOTE PROVIDER - CARE PROVIDER_API CALL
Valente Prater  Orthopaedic Surgery  [address] [place] [id number]  Phone: [phone]  Fax: [phone]  Follow Up Time:

## 2023-11-21 NOTE — DISCHARGE NOTE NURSING/CASE MANAGEMENT/SOCIAL WORK - PATIENT PORTAL LINK FT
You can access the FollowMyHealth Patient Portal offered by [facility] by registering at the following website: [email or web address]. By joining [email or web address]’s FollowMyHealth portal, you will also be able to view your health information using other applications (apps) compatible with our system.

## 2023-11-21 NOTE — DISCHARGE NOTE PROVIDER - NSDCFUSCHEDAPPT_GEN_ALL_CORE_FT
Elebiary, Yeon J  [facility]  ORTHOSURG [address]  Scheduled Appointment: 12/12/2023    Valente Prater  [facility]  ORTHOSURG [address]  Scheduled Appointment: 01/31/2024

## 2023-11-21 NOTE — DISCHARGE NOTE PROVIDER - HOSPITAL COURSE
The patient underwent a LEFT TOTAL KNEE REPLACEMENT on 11/21/23. The patient received antibiotics consistent with SCIP guidelines. The patient underwent the procedure and had no intra-operative complications. Post-operatively, the patient was seen by medicine and PT. The patient received ASPIRIN for clot prevention. The patient received pain medications per orthopedic pain management pathway and the pain was appropriately controlled. The patient did not have any post-operative medical complications. The patient was discharged in stable condition. The patient underwent a LEFT TOTAL KNEE REPLACEMENT on 11/21/23. The patient received antibiotics consistent with SCIP guidelines. The patient underwent the procedure and had no intra-operative complications. Post-operatively, the patient was seen by medicine and PT. The patient received ASPIRIN for DVTprevention. The patient received pain medications per orthopedic pain management pathway and the pain was appropriately controlled. The patient did not have any post-operative medical complications. The patient was discharged in stable condition.

## 2023-11-21 NOTE — CONSULT NOTE ADULT - ASSESSMENT
58-y/o male with Hx of Morbid Obesity, AISHA not using any CPAP, HTN, Gout, he has bilateral knee pain left worse than right. He has end-stage knee osteoarthritis and he had cortisone injection which gave him good relief, he came in here for elective Left Knee Total joint Replacement by dr. Prater on 11/21/23 s/p procedure.       Plan:     OA of the Left Knee s/p TKR post op day 0:     - post op no complications  - VS stable  - abx per ortho   - c/w anti hypertensive to be restarted post op day 02 except if blood pressure goes >150 systolic   - c/w IVF x 24 hrs then reassess per ortho  - opiate induced constipation regimen   - encouraging incentive spirometry   -c/w local wound care per ortho   -DVT prophylaxis and Pain meds as per Ortho team   -PT/OT and weight bearing per ortho     Gout: Will continue with allopurinol 300 mg once a day    HTN: Will resume olmesartan 40 mg once a day once blood pressure is up.     Morbid Obesity: Low caloric diet.     AISHA: he was following Dr Vallecillo from Pulmonary, in last note he was on 20cm of water, will resume CPAP, if still desaturating well call pulmonary consult.   as per PACU staff while laying in sleep he was sating low and was having apenic episodes     
58M  hx sig for fior  hx of chronic pain on oxy ir 20mg qid, recently weaned from 30mg qid  s/p TKR    Med Recs:  ketorolac   Injectable 15 milliGRAM(s) IV Push every 6 hours  oxyCODONE    IR 20 milliGRAM(s) Oral every 4 hours PRN  oxyCODONE    IR 10 milliGRAM(s) Oral every 4 hours PRN      if pain uncontrolled:  Start acetaminophen 975mg q6 hours standing. HOLD for LFT dysfunction. MAX daily dose = 4 grams   - Recommend starting robaxin 750mg TID standing. HOLD for sedation   - Recommend starting hydromorphone IVP 0.5mg q4hour PRN breakthrough pain

## 2023-11-22 VITALS
SYSTOLIC BLOOD PRESSURE: 126 MMHG | TEMPERATURE: 98 F | HEART RATE: 96 BPM | RESPIRATION RATE: 18 BRPM | DIASTOLIC BLOOD PRESSURE: 80 MMHG | OXYGEN SATURATION: 98 %

## 2023-11-22 LAB
ANION GAP SERPL CALC-SCNC: 9 MMOL/L — SIGNIFICANT CHANGE UP (ref 5–17)
ANION GAP SERPL CALC-SCNC: 9 MMOL/L — SIGNIFICANT CHANGE UP (ref 5–17)
BUN SERPL-MCNC: 32.8 MG/DL — HIGH (ref 8–20)
BUN SERPL-MCNC: 32.8 MG/DL — HIGH (ref 8–20)
CALCIUM SERPL-MCNC: 8.6 MG/DL — SIGNIFICANT CHANGE UP (ref 8.4–10.5)
CALCIUM SERPL-MCNC: 8.6 MG/DL — SIGNIFICANT CHANGE UP (ref 8.4–10.5)
CHLORIDE SERPL-SCNC: 102 MMOL/L — SIGNIFICANT CHANGE UP (ref 96–108)
CHLORIDE SERPL-SCNC: 102 MMOL/L — SIGNIFICANT CHANGE UP (ref 96–108)
CO2 SERPL-SCNC: 27 MMOL/L — SIGNIFICANT CHANGE UP (ref 22–29)
CO2 SERPL-SCNC: 27 MMOL/L — SIGNIFICANT CHANGE UP (ref 22–29)
CREAT SERPL-MCNC: 0.99 MG/DL — SIGNIFICANT CHANGE UP (ref 0.5–1.3)
CREAT SERPL-MCNC: 0.99 MG/DL — SIGNIFICANT CHANGE UP (ref 0.5–1.3)
EGFR: 88 ML/MIN/1.73M2 — SIGNIFICANT CHANGE UP
EGFR: 88 ML/MIN/1.73M2 — SIGNIFICANT CHANGE UP
GLUCOSE BLDC GLUCOMTR-MCNC: 140 MG/DL — HIGH (ref 70–99)
GLUCOSE BLDC GLUCOMTR-MCNC: 140 MG/DL — HIGH (ref 70–99)
GLUCOSE SERPL-MCNC: 148 MG/DL — HIGH (ref 70–99)
GLUCOSE SERPL-MCNC: 148 MG/DL — HIGH (ref 70–99)
HCT VFR BLD CALC: 26.5 % — LOW (ref 39–50)
HCT VFR BLD CALC: 26.5 % — LOW (ref 39–50)
HGB BLD-MCNC: 8.7 G/DL — LOW (ref 13–17)
HGB BLD-MCNC: 8.7 G/DL — LOW (ref 13–17)
MCHC RBC-ENTMCNC: 30.2 PG — SIGNIFICANT CHANGE UP (ref 27–34)
MCHC RBC-ENTMCNC: 30.2 PG — SIGNIFICANT CHANGE UP (ref 27–34)
MCHC RBC-ENTMCNC: 32.8 GM/DL — SIGNIFICANT CHANGE UP (ref 32–36)
MCHC RBC-ENTMCNC: 32.8 GM/DL — SIGNIFICANT CHANGE UP (ref 32–36)
MCV RBC AUTO: 92 FL — SIGNIFICANT CHANGE UP (ref 80–100)
MCV RBC AUTO: 92 FL — SIGNIFICANT CHANGE UP (ref 80–100)
PLATELET # BLD AUTO: 210 K/UL — SIGNIFICANT CHANGE UP (ref 150–400)
PLATELET # BLD AUTO: 210 K/UL — SIGNIFICANT CHANGE UP (ref 150–400)
POTASSIUM SERPL-MCNC: 4.9 MMOL/L — SIGNIFICANT CHANGE UP (ref 3.5–5.3)
POTASSIUM SERPL-MCNC: 4.9 MMOL/L — SIGNIFICANT CHANGE UP (ref 3.5–5.3)
POTASSIUM SERPL-SCNC: 4.9 MMOL/L — SIGNIFICANT CHANGE UP (ref 3.5–5.3)
POTASSIUM SERPL-SCNC: 4.9 MMOL/L — SIGNIFICANT CHANGE UP (ref 3.5–5.3)
RBC # BLD: 2.88 M/UL — LOW (ref 4.2–5.8)
RBC # BLD: 2.88 M/UL — LOW (ref 4.2–5.8)
RBC # FLD: 13.2 % — SIGNIFICANT CHANGE UP (ref 10.3–14.5)
RBC # FLD: 13.2 % — SIGNIFICANT CHANGE UP (ref 10.3–14.5)
SODIUM SERPL-SCNC: 138 MMOL/L — SIGNIFICANT CHANGE UP (ref 135–145)
SODIUM SERPL-SCNC: 138 MMOL/L — SIGNIFICANT CHANGE UP (ref 135–145)
WBC # BLD: 10.66 K/UL — HIGH (ref 3.8–10.5)
WBC # BLD: 10.66 K/UL — HIGH (ref 3.8–10.5)
WBC # FLD AUTO: 10.66 K/UL — HIGH (ref 3.8–10.5)
WBC # FLD AUTO: 10.66 K/UL — HIGH (ref 3.8–10.5)

## 2023-11-22 PROCEDURE — C1776: CPT

## 2023-11-22 PROCEDURE — 86901 BLOOD TYPING SEROLOGIC RH(D): CPT

## 2023-11-22 PROCEDURE — 99232 SBSQ HOSP IP/OBS MODERATE 35: CPT

## 2023-11-22 PROCEDURE — 86900 BLOOD TYPING SEROLOGIC ABO: CPT

## 2023-11-22 PROCEDURE — 94660 CPAP INITIATION&MGMT: CPT

## 2023-11-22 PROCEDURE — 97116 GAIT TRAINING THERAPY: CPT

## 2023-11-22 PROCEDURE — 36415 COLL VENOUS BLD VENIPUNCTURE: CPT

## 2023-11-22 PROCEDURE — 85027 COMPLETE CBC AUTOMATED: CPT

## 2023-11-22 PROCEDURE — 82962 GLUCOSE BLOOD TEST: CPT

## 2023-11-22 PROCEDURE — 80048 BASIC METABOLIC PNL TOTAL CA: CPT

## 2023-11-22 PROCEDURE — 27447 TOTAL KNEE ARTHROPLASTY: CPT

## 2023-11-22 PROCEDURE — 73560 X-RAY EXAM OF KNEE 1 OR 2: CPT

## 2023-11-22 PROCEDURE — C1713: CPT

## 2023-11-22 PROCEDURE — 97110 THERAPEUTIC EXERCISES: CPT

## 2023-11-22 PROCEDURE — 86850 RBC ANTIBODY SCREEN: CPT

## 2023-11-22 RX ORDER — PANTOPRAZOLE SODIUM 20 MG/1
1 TABLET, DELAYED RELEASE ORAL
Qty: 42 | Refills: 0
Start: 2023-11-22 | End: 2024-01-02

## 2023-11-22 RX ORDER — OXYCODONE HYDROCHLORIDE 5 MG/1
1 TABLET ORAL
Refills: 0 | DISCHARGE

## 2023-11-22 RX ORDER — CELECOXIB 200 MG/1
1 CAPSULE ORAL
Qty: 60 | Refills: 0
Start: 2023-11-22 | End: 2023-12-21

## 2023-11-22 RX ORDER — SENNOSIDES/DOCUSATE SODIUM 8.6MG-50MG
2 TABLET ORAL
Qty: 10 | Refills: 0
Start: 2023-11-22 | End: 2023-11-26

## 2023-11-22 RX ORDER — ASPIRIN/CALCIUM CARB/MAGNESIUM 324 MG
1 TABLET ORAL
Qty: 84 | Refills: 0
Start: 2023-11-22 | End: 2024-01-02

## 2023-11-22 RX ORDER — OXYCODONE HYDROCHLORIDE 5 MG/1
1 TABLET ORAL
Qty: 20 | Refills: 0
Start: 2023-11-22 | End: 2023-11-26

## 2023-11-22 RX ORDER — ACETAMINOPHEN 500 MG
3 TABLET ORAL
Qty: 0 | Refills: 0 | DISCHARGE
Start: 2023-11-22

## 2023-11-22 RX ADMIN — OXYCODONE HYDROCHLORIDE 20 MILLIGRAM(S): 5 TABLET ORAL at 06:09

## 2023-11-22 RX ADMIN — Medication 400 MILLIGRAM(S): at 05:22

## 2023-11-22 RX ADMIN — OXYCODONE HYDROCHLORIDE 20 MILLIGRAM(S): 5 TABLET ORAL at 07:09

## 2023-11-22 RX ADMIN — Medication 81 MILLIGRAM(S): at 05:22

## 2023-11-22 RX ADMIN — Medication 15 MILLIGRAM(S): at 05:22

## 2023-11-22 RX ADMIN — Medication 15 MILLIGRAM(S): at 06:22

## 2023-11-22 RX ADMIN — PANTOPRAZOLE SODIUM 40 MILLIGRAM(S): 20 TABLET, DELAYED RELEASE ORAL at 05:21

## 2023-11-22 RX ADMIN — OXYCODONE HYDROCHLORIDE 20 MILLIGRAM(S): 5 TABLET ORAL at 11:15

## 2023-11-22 RX ADMIN — OXYCODONE HYDROCHLORIDE 20 MILLIGRAM(S): 5 TABLET ORAL at 10:18

## 2023-11-22 RX ADMIN — Medication 1000 MILLIGRAM(S): at 06:22

## 2023-11-22 NOTE — PROGRESS NOTE ADULT - ASSESSMENT
58-y/o male with Hx of Morbid Obesity, AISHA not using any CPAP, HTN, Gout, he has bilateral knee pain left worse than right. He has end-stage knee osteoarthritis and he had cortisone injection which gave him good relief, he came in here for elective Left Knee Total joint Replacement by dr. Prater on 11/21/23 s/p procedure.       Plan:     OA of the Left Knee s/p TKR post op day 0:     - post op no complications  - VS stable  - abx per ortho   - opiate induced constipation regimen   - encouraging incentive spirometry   -c/w local wound care per ortho   -DVT prophylaxis and Pain meds as per Ortho team   -PT/OT and weight bearing per ortho     Gout: Will continue with allopurinol 300 mg once a day    HTN: Will resume olmesartan 40 mg once a day once blood pressure is up.     Morbid Obesity: Low caloric diet.     AISHA: he was following Dr Vallecillo from Pulmonary, in last note he was on 20cm of water, he was given CPAP at night    He was told to follow with him to make sure he gets his devic fixed    Patient is stable from the medicine point of view for discharge pending PT and Ortho eval.    58-y/o male with Hx of Morbid Obesity, AISHA not using any CPAP, HTN, Gout, he has bilateral knee pain left worse than right. He has end-stage knee osteoarthritis and he had cortisone injection which gave him good relief, he came in here for elective Left Knee Total joint Replacement by dr. Prater on 11/21/23 s/p procedure.       Plan:     OA of the Left Knee s/p TKR post op day 0:     - post op no complications  - VS stable  - abx per ortho   - opiate induced constipation regimen   - encouraging incentive spirometry   -c/w local wound care per ortho   -DVT prophylaxis and Pain meds as per Ortho team   -PT/OT and weight bearing per ortho     Gout: Will continue with allopurinol 300 mg once a day    HTN: Will resume olmesartan 40 mg once a day once blood pressure is up.     Morbid Obesity: Low caloric diet.     AISHA: he was following Dr Vallecillo from Pulmonary, in last note he was on 20cm of water, he was given CPAP at night    He was told to follow with him to make sure he gets his devic fixed    Acute blood loss anemia due to procedure: Iron supplement    Patient is stable from the medicine point of view for discharge pending PT and Ortho eval.

## 2023-11-22 NOTE — PROGRESS NOTE ADULT - ASSESSMENT
58M  hx sig for fior  hx of chronic pain on oxy ir 20mg qid, recently weaned from 30mg qid  s/p TKR    Pain controlled  Pain service will sign off  Please reconsult as needed

## 2023-11-22 NOTE — PROGRESS NOTE ADULT - SUBJECTIVE AND OBJECTIVE BOX
Ortho Post Op Check    Name: LEONIDAS DALE    MR #: [id number]    Procedure: Left total knee arthroplasty   Surgeon: Dr Prater    Pt comfortable without complaints, pain controlled  Denies CP, SOB, N/V, numbness/tingling     MEDICATIONS  (STANDING):  acetaminophen     Tablet .. 975 milliGRAM(s) Oral every 8 hours  acetaminophen   IVPB .. 1000 milliGRAM(s) IV Intermittent once  aspirin enteric coated 81 milliGRAM(s) Oral two times a day  ceFAZolin  Injectable. 2000 milliGRAM(s) IV Push <User Schedule>  dextrose 5%. 1000 milliLiter(s) (100 mL/Hr) IV Continuous <Continuous>  dextrose 5%. 1000 milliLiter(s) (50 mL/Hr) IV Continuous <Continuous>  dextrose 50% Injectable 25 Gram(s) IV Push once  dextrose 50% Injectable 12.5 Gram(s) IV Push once  dextrose 50% Injectable 25 Gram(s) IV Push once  glucagon  Injectable 1 milliGRAM(s) IntraMuscular once  insulin lispro (ADMELOG) corrective regimen sliding scale   SubCutaneous three times a day before meals  ketorolac   Injectable 15 milliGRAM(s) IV Push every 6 hours  lactated ringers Bolus 500 milliLiter(s) IV Bolus once  lactated ringers. 1000 milliLiter(s) (75 mL/Hr) IV Continuous <Continuous>  pantoprazole    Tablet 40 milliGRAM(s) Oral before breakfast  polyethylene glycol 3350 17 Gram(s) Oral at bedtime  senna 2 Tablet(s) Oral at bedtime  sodium chloride 0.9%. 1000 milliLiter(s) (125 mL/Hr) IV Continuous <Continuous>    MEDICATIONS  (PRN):  aluminum hydroxide/magnesium hydroxide/simethicone Suspension 30 milliLiter(s) Oral four times a day PRN Indigestion  dextrose Oral Gel 15 Gram(s) Oral once PRN Blood Glucose LESS THAN 70 milliGRAM(s)/deciliter  fentaNYL    Injectable 50 MICROGram(s) IV Push every 5 minutes PRN Severe Pain (7 - 10)  HYDROmorphone  Injectable 0.5 milliGRAM(s) IV Push every 10 minutes PRN Moderate Pain (4 - 6)  magnesium hydroxide Suspension 30 milliLiter(s) Oral daily PRN Constipation  ondansetron Injectable 4 milliGRAM(s) IV Push every 6 hours PRN Nausea and/or Vomiting  ondansetron Injectable 4 milliGRAM(s) IV Push once PRN Nausea and/or Vomiting  oxyCODONE    IR 10 milliGRAM(s) Oral every 4 hours PRN Moderate Pain (4 - 6)  oxyCODONE    IR 20 milliGRAM(s) Oral every 4 hours PRN Severe Pain (7 - 10)      General Exam:  Vital Signs Last 24 Hrs  T(C): 36.1 (11-21-23 @ 14:04), Max: 36.1 (11-21-23 @ 14:04)  T(F): 97 (11-21-23 @ 14:04), Max: 97 (11-21-23 @ 14:04)  HR: 68 (11-21-23 @ 14:45) (68 - 76)  BP: 113/82 (11-21-23 @ 14:45) (110/78 - 119/80)  BP(mean): --  RR: 14 (11-21-23 @ 14:45) (12 - 15)  SpO2: 100% (11-21-23 @ 14:45) (99% - 100%)    General: Pt Alert and oriented, NAD, controlled pain.  Dressings C/D/I. No bleeding.  Pulses: 2+ dorsalis pedis pulse. Cap refill < 2 sec.  Sensation: Grossly intact to light touch without deficit.  Motor: + EHL/FHL/TA/GS      Post-op X-Ray: prosthesis in place. No fx/dislocation    A/P: 58yMale POD#0 s/p Left total knee arthroplasty   - Stable  - Pain Control  - DVT ppx: SCDs/ASA  - Post op abx: ancef  - PT eval pending  - Weight bearing status: WBAT  - medicine following  - am labs
Interval Hx:  Patient seen during rounds  Patient reports pain to be controlled on current medications  Patient denies sedation with medications     Analgesic Dosing for past 24 hours reviewed as below:  acetaminophen     Tablet ..   975 milliGRAM(s) Oral (11-21-23 @ 22:54)    acetaminophen   IVPB ..   400 mL/Hr IV Intermittent (11-22-23 @ 05:22)    ketorolac   Injectable   15 milliGRAM(s) IV Push (11-22-23 @ 05:22)   15 milliGRAM(s) IV Push (11-21-23 @ 22:54)   15 milliGRAM(s) IV Push (11-21-23 @ 17:47)    oxyCODONE    IR   20 milliGRAM(s) Oral (11-22-23 @ 10:18)   20 milliGRAM(s) Oral (11-22-23 @ 06:09)   20 milliGRAM(s) Oral (11-21-23 @ 22:55)   20 milliGRAM(s) Oral (11-21-23 @ 17:46)          T(C): 36.9 (11-22-23 @ 09:18), Max: 36.9 (11-22-23 @ 09:18)  HR: 96 (11-22-23 @ 09:18) (62 - 96)  BP: 126/80 (11-22-23 @ 09:18) (93/73 - 126/80)  RR: 18 (11-22-23 @ 09:18) (12 - 20)  SpO2: 98% (11-22-23 @ 09:18) (95% - 100%)      11-21-23 @ 07:01  -  11-22-23 @ 07:00  --------------------------------------------------------  IN: 1615 mL / OUT: 850 mL / NET: 765 mL        acetaminophen     Tablet .. 975 milliGRAM(s) Oral every 8 hours  allopurinol 300 milliGRAM(s) Oral daily  aluminum hydroxide/magnesium hydroxide/simethicone Suspension 30 milliLiter(s) Oral four times a day PRN  aspirin enteric coated 81 milliGRAM(s) Oral two times a day  dextrose 5%. 1000 milliLiter(s) IV Continuous <Continuous>  dextrose 5%. 1000 milliLiter(s) IV Continuous <Continuous>  dextrose 50% Injectable 25 Gram(s) IV Push once  dextrose 50% Injectable 12.5 Gram(s) IV Push once  dextrose 50% Injectable 25 Gram(s) IV Push once  dextrose Oral Gel 15 Gram(s) Oral once PRN  glucagon  Injectable 1 milliGRAM(s) IntraMuscular once  insulin lispro (ADMELOG) corrective regimen sliding scale   SubCutaneous three times a day before meals  ketorolac   Injectable 15 milliGRAM(s) IV Push every 6 hours  magnesium hydroxide Suspension 30 milliLiter(s) Oral daily PRN  ondansetron Injectable 4 milliGRAM(s) IV Push every 6 hours PRN  oxyCODONE    IR 10 milliGRAM(s) Oral every 4 hours PRN  oxyCODONE    IR 20 milliGRAM(s) Oral every 4 hours PRN  pantoprazole    Tablet 40 milliGRAM(s) Oral before breakfast  polyethylene glycol 3350 17 Gram(s) Oral at bedtime  senna 2 Tablet(s) Oral at bedtime  sodium chloride 0.9%. 1000 milliLiter(s) IV Continuous <Continuous>                          8.7    10.66 )-----------( 210      ( 22 Nov 2023 06:00 )             26.5     11-22    138  |  102  |  32.8<H>  ----------------------------<  148<H>  4.9   |  27.0  |  0.99    Ca    8.6      22 Nov 2023 06:00        Urinalysis Basic - ( 22 Nov 2023 06:00 )    Color: x / Appearance: x / SG: x / pH: x  Gluc: 148 mg/dL / Ketone: x  / Bili: x / Urobili: x   Blood: x / Protein: x / Nitrite: x   Leuk Esterase: x / RBC: x / WBC x   Sq Epi: x / Non Sq Epi: x / Bacteria: x        Pain Service   [phone]
LEONIDAS DALE    [id number]    58y      Male    Patient is a 58y old  Male who presents with a chief complaint of Left TKA (22 Nov 2023 09:15)      INTERVAL HPI/OVERNIGHT EVENTS:    Patient is doing ok, pain is well managed with pain medications, has no fever, chills, chest pain, SOB, nausea, vomiting, constipation.       Vital Signs Last 24 Hrs  T(C): 36.9 (22 Nov 2023 09:18), Max: 36.9 (22 Nov 2023 09:18)  T(F): 98.5 (22 Nov 2023 09:18), Max: 98.5 (22 Nov 2023 09:18)  HR: 96 (22 Nov 2023 09:18) (62 - 96)  BP: 126/80 (22 Nov 2023 09:18) (93/73 - 126/80)  RR: 18 (22 Nov 2023 09:18) (12 - 20)  SpO2: 98% (22 Nov 2023 09:18) (95% - 100%)    Parameters below as of 22 Nov 2023 09:18  Patient On (Oxygen Delivery Method): room air        PHYSICAL EXAM:  GENERAL: Middle age male looking comfortable   HEENT: PERRL, +EOMI  NECK: soft, Supple, No JVD  CHEST/LUNG: Clear to auscultate bilaterally; No wheezing  HEART: S1S2+, Regular rate and rhythm; No murmurs  ABDOMEN: Soft, Nontender, Nondistended; Bowel sounds present  EXTREMITIES:  1+ Peripheral Pulses, No edema, left Knee Joint area cover with dressing, no bleeding or soaking   SKIN: No rashes or lesions  NEURO: AAOX3  PSYCH: normal mood      LABS:                        8.7    10.66 )-----------( 210      ( 22 Nov 2023 06:00 )             26.5     11-22    138  |  102  |  32.8<H>  ----------------------------<  148<H>  4.9   |  27.0  |  0.99    Ca    8.6      22 Nov 2023 06:00        Urinalysis Basic - ( 22 Nov 2023 06:00 )    Color: x / Appearance: x / SG: x / pH: x  Gluc: 148 mg/dL / Ketone: x  / Bili: x / Urobili: x   Blood: x / Protein: x / Nitrite: x   Leuk Esterase: x / RBC: x / WBC x   Sq Epi: x / Non Sq Epi: x / Bacteria: x          I&O's Summary    21 Nov 2023 07:01  -  22 Nov 2023 07:00  --------------------------------------------------------  IN: 1615 mL / OUT: 850 mL / NET: 765 mL        MEDICATIONS  (STANDING):  acetaminophen     Tablet .. 975 milliGRAM(s) Oral every 8 hours  allopurinol 300 milliGRAM(s) Oral daily  aspirin enteric coated 81 milliGRAM(s) Oral two times a day  dextrose 5%. 1000 milliLiter(s) (50 mL/Hr) IV Continuous <Continuous>  dextrose 5%. 1000 milliLiter(s) (100 mL/Hr) IV Continuous <Continuous>  dextrose 50% Injectable 25 Gram(s) IV Push once  dextrose 50% Injectable 12.5 Gram(s) IV Push once  dextrose 50% Injectable 25 Gram(s) IV Push once  glucagon  Injectable 1 milliGRAM(s) IntraMuscular once  insulin lispro (ADMELOG) corrective regimen sliding scale   SubCutaneous three times a day before meals  ketorolac   Injectable 15 milliGRAM(s) IV Push every 6 hours  pantoprazole    Tablet 40 milliGRAM(s) Oral before breakfast  polyethylene glycol 3350 17 Gram(s) Oral at bedtime  senna 2 Tablet(s) Oral at bedtime  sodium chloride 0.9%. 1000 milliLiter(s) (125 mL/Hr) IV Continuous <Continuous>    MEDICATIONS  (PRN):  aluminum hydroxide/magnesium hydroxide/simethicone Suspension 30 milliLiter(s) Oral four times a day PRN Indigestion  dextrose Oral Gel 15 Gram(s) Oral once PRN Blood Glucose LESS THAN 70 milliGRAM(s)/deciliter  magnesium hydroxide Suspension 30 milliLiter(s) Oral daily PRN Constipation  ondansetron Injectable 4 milliGRAM(s) IV Push every 6 hours PRN Nausea and/or Vomiting  oxyCODONE    IR 10 milliGRAM(s) Oral every 4 hours PRN Moderate Pain (4 - 6)  oxyCODONE    IR 20 milliGRAM(s) Oral every 4 hours PRN Severe Pain (7 - 10)        
LEONIDAS SHERIDANINSKI    [id number]    History: 58y Male is status post Left total knee arthroplasty on 11/21, POD#1.  Patient is doing well and is comfortable. The patient's pain is controlled using the prescribed pain medications. The patient is participating in physical therapy. Denies nausea, vomiting, chest pain, shortness of breath, abdominal pain or fever. No new complaints.                              8.7    10.66 )-----------( 210      ( 22 Nov 2023 06:00 )             26.5     11-22    138  |  102  |  32.8<H>  ----------------------------<  148<H>  4.9   |  27.0  |  0.99    Ca    8.6      22 Nov 2023 06:00        MEDICATIONS  (STANDING):  acetaminophen     Tablet .. 975 milliGRAM(s) Oral every 8 hours  allopurinol 300 milliGRAM(s) Oral daily  aspirin enteric coated 81 milliGRAM(s) Oral two times a day  dextrose 5%. 1000 milliLiter(s) (50 mL/Hr) IV Continuous <Continuous>  dextrose 5%. 1000 milliLiter(s) (100 mL/Hr) IV Continuous <Continuous>  dextrose 50% Injectable 12.5 Gram(s) IV Push once  dextrose 50% Injectable 25 Gram(s) IV Push once  dextrose 50% Injectable 25 Gram(s) IV Push once  glucagon  Injectable 1 milliGRAM(s) IntraMuscular once  insulin lispro (ADMELOG) corrective regimen sliding scale   SubCutaneous three times a day before meals  ketorolac   Injectable 15 milliGRAM(s) IV Push every 6 hours  pantoprazole    Tablet 40 milliGRAM(s) Oral before breakfast  polyethylene glycol 3350 17 Gram(s) Oral at bedtime  senna 2 Tablet(s) Oral at bedtime  sodium chloride 0.9%. 1000 milliLiter(s) (125 mL/Hr) IV Continuous <Continuous>    MEDICATIONS  (PRN):  aluminum hydroxide/magnesium hydroxide/simethicone Suspension 30 milliLiter(s) Oral four times a day PRN Indigestion  dextrose Oral Gel 15 Gram(s) Oral once PRN Blood Glucose LESS THAN 70 milliGRAM(s)/deciliter  magnesium hydroxide Suspension 30 milliLiter(s) Oral daily PRN Constipation  ondansetron Injectable 4 milliGRAM(s) IV Push every 6 hours PRN Nausea and/or Vomiting  oxyCODONE    IR 20 milliGRAM(s) Oral every 4 hours PRN Severe Pain (7 - 10)  oxyCODONE    IR 10 milliGRAM(s) Oral every 4 hours PRN Moderate Pain (4 - 6)      Physical exam: The Left  knee dressing is clean, dry and intact. No drainage or discharge. No erythema is noted. No blistering. No ecchymosis. The calf is supple nontender. Passive range of motion is acceptable to due postoperative pain. No calf tenderness. Sensation to light touch is grossly intact distally. Motor function distally is 5/5. Extensor hallucis longus and flexor hallucis longus are intact. No foot drop. 2+ dorsalis pedis pulse. Capillary refill is less than 2 seconds. No cyanosis.    Primary Orthopedic Assessment:  • s/p Left  total knee replacement    Secondary  Orthopedic Assessment(s):   •     Secondary  Medical Assessment(s):   •     Plan:   • DVT prophylaxis as prescribed, including use of compression devices and ankle pumps  • Continue physical therapy  • Weightbearing as tolerated of the Left  lower extremity with assistance of a walker  • Incentive spirometry encouraged  • Pain control as clinically indicated  • Discharge planning – anticipated discharge is Home

## 2023-11-24 ENCOUNTER — TRANSCRIPTION ENCOUNTER (OUTPATIENT)
Age: 58
End: 2023-11-24

## 2023-11-26 ENCOUNTER — EMERGENCY (EMERGENCY)
Facility: HOSPITAL | Age: 58
LOS: 1 days | Discharge: HOME | End: 2023-11-26
Attending: EMERGENCY MEDICINE
Payer: MEDICARE

## 2023-11-26 VITALS
DIASTOLIC BLOOD PRESSURE: 62 MMHG | TEMPERATURE: 98 F | OXYGEN SATURATION: 98 % | RESPIRATION RATE: 18 BRPM | HEIGHT: 74 IN | WEIGHT: 315 LBS | SYSTOLIC BLOOD PRESSURE: 116 MMHG | HEART RATE: 79 BPM

## 2023-11-26 VITALS
RESPIRATION RATE: 18 BRPM | DIASTOLIC BLOOD PRESSURE: 68 MMHG | HEART RATE: 80 BPM | OXYGEN SATURATION: 99 % | SYSTOLIC BLOOD PRESSURE: 122 MMHG

## 2023-11-26 DIAGNOSIS — Z98.890 OTHER SPECIFIED POSTPROCEDURAL STATES: Chronic | ICD-10-CM

## 2023-11-26 LAB
ALBUMIN SERPL ELPH-MCNC: 3.7 G/DL — SIGNIFICANT CHANGE UP (ref 3.3–5.2)
ALBUMIN SERPL ELPH-MCNC: 3.7 G/DL — SIGNIFICANT CHANGE UP (ref 3.3–5.2)
ALP SERPL-CCNC: 89 U/L — SIGNIFICANT CHANGE UP (ref 40–120)
ALP SERPL-CCNC: 89 U/L — SIGNIFICANT CHANGE UP (ref 40–120)
ALT FLD-CCNC: 34 U/L — SIGNIFICANT CHANGE UP
ALT FLD-CCNC: 34 U/L — SIGNIFICANT CHANGE UP
ANION GAP SERPL CALC-SCNC: 10 MMOL/L — SIGNIFICANT CHANGE UP (ref 5–17)
ANION GAP SERPL CALC-SCNC: 10 MMOL/L — SIGNIFICANT CHANGE UP (ref 5–17)
APTT BLD: 31 SEC — SIGNIFICANT CHANGE UP (ref 24.5–35.6)
APTT BLD: 31 SEC — SIGNIFICANT CHANGE UP (ref 24.5–35.6)
AST SERPL-CCNC: 24 U/L — SIGNIFICANT CHANGE UP
AST SERPL-CCNC: 24 U/L — SIGNIFICANT CHANGE UP
BASOPHILS # BLD AUTO: 0.06 K/UL — SIGNIFICANT CHANGE UP (ref 0–0.2)
BASOPHILS # BLD AUTO: 0.06 K/UL — SIGNIFICANT CHANGE UP (ref 0–0.2)
BASOPHILS NFR BLD AUTO: 0.5 % — SIGNIFICANT CHANGE UP (ref 0–2)
BASOPHILS NFR BLD AUTO: 0.5 % — SIGNIFICANT CHANGE UP (ref 0–2)
BILIRUB SERPL-MCNC: 0.3 MG/DL — LOW (ref 0.4–2)
BILIRUB SERPL-MCNC: 0.3 MG/DL — LOW (ref 0.4–2)
BUN SERPL-MCNC: 32.4 MG/DL — HIGH (ref 8–20)
BUN SERPL-MCNC: 32.4 MG/DL — HIGH (ref 8–20)
CALCIUM SERPL-MCNC: 9 MG/DL — SIGNIFICANT CHANGE UP (ref 8.4–10.5)
CALCIUM SERPL-MCNC: 9 MG/DL — SIGNIFICANT CHANGE UP (ref 8.4–10.5)
CHLORIDE SERPL-SCNC: 102 MMOL/L — SIGNIFICANT CHANGE UP (ref 96–108)
CHLORIDE SERPL-SCNC: 102 MMOL/L — SIGNIFICANT CHANGE UP (ref 96–108)
CO2 SERPL-SCNC: 26 MMOL/L — SIGNIFICANT CHANGE UP (ref 22–29)
CO2 SERPL-SCNC: 26 MMOL/L — SIGNIFICANT CHANGE UP (ref 22–29)
CREAT SERPL-MCNC: 1.15 MG/DL — SIGNIFICANT CHANGE UP (ref 0.5–1.3)
CREAT SERPL-MCNC: 1.15 MG/DL — SIGNIFICANT CHANGE UP (ref 0.5–1.3)
EGFR: 74 ML/MIN/1.73M2 — SIGNIFICANT CHANGE UP
EGFR: 74 ML/MIN/1.73M2 — SIGNIFICANT CHANGE UP
EOSINOPHIL # BLD AUTO: 0.53 K/UL — HIGH (ref 0–0.5)
EOSINOPHIL # BLD AUTO: 0.53 K/UL — HIGH (ref 0–0.5)
EOSINOPHIL NFR BLD AUTO: 4.8 % — SIGNIFICANT CHANGE UP (ref 0–6)
EOSINOPHIL NFR BLD AUTO: 4.8 % — SIGNIFICANT CHANGE UP (ref 0–6)
GLUCOSE SERPL-MCNC: 121 MG/DL — HIGH (ref 70–99)
GLUCOSE SERPL-MCNC: 121 MG/DL — HIGH (ref 70–99)
HCT VFR BLD CALC: 23.6 % — LOW (ref 39–50)
HCT VFR BLD CALC: 23.6 % — LOW (ref 39–50)
HGB BLD-MCNC: 7.8 G/DL — LOW (ref 13–17)
HGB BLD-MCNC: 7.8 G/DL — LOW (ref 13–17)
IMM GRANULOCYTES NFR BLD AUTO: 0.8 % — SIGNIFICANT CHANGE UP (ref 0–0.9)
IMM GRANULOCYTES NFR BLD AUTO: 0.8 % — SIGNIFICANT CHANGE UP (ref 0–0.9)
INR BLD: 1.03 RATIO — SIGNIFICANT CHANGE UP (ref 0.85–1.18)
INR BLD: 1.03 RATIO — SIGNIFICANT CHANGE UP (ref 0.85–1.18)
LYMPHOCYTES # BLD AUTO: 2.7 K/UL — SIGNIFICANT CHANGE UP (ref 1–3.3)
LYMPHOCYTES # BLD AUTO: 2.7 K/UL — SIGNIFICANT CHANGE UP (ref 1–3.3)
LYMPHOCYTES # BLD AUTO: 24.3 % — SIGNIFICANT CHANGE UP (ref 13–44)
LYMPHOCYTES # BLD AUTO: 24.3 % — SIGNIFICANT CHANGE UP (ref 13–44)
MCHC RBC-ENTMCNC: 31.2 PG — SIGNIFICANT CHANGE UP (ref 27–34)
MCHC RBC-ENTMCNC: 31.2 PG — SIGNIFICANT CHANGE UP (ref 27–34)
MCHC RBC-ENTMCNC: 33.1 GM/DL — SIGNIFICANT CHANGE UP (ref 32–36)
MCHC RBC-ENTMCNC: 33.1 GM/DL — SIGNIFICANT CHANGE UP (ref 32–36)
MCV RBC AUTO: 94.4 FL — SIGNIFICANT CHANGE UP (ref 80–100)
MCV RBC AUTO: 94.4 FL — SIGNIFICANT CHANGE UP (ref 80–100)
MONOCYTES # BLD AUTO: 0.91 K/UL — HIGH (ref 0–0.9)
MONOCYTES # BLD AUTO: 0.91 K/UL — HIGH (ref 0–0.9)
MONOCYTES NFR BLD AUTO: 8.2 % — SIGNIFICANT CHANGE UP (ref 2–14)
MONOCYTES NFR BLD AUTO: 8.2 % — SIGNIFICANT CHANGE UP (ref 2–14)
NEUTROPHILS # BLD AUTO: 6.81 K/UL — SIGNIFICANT CHANGE UP (ref 1.8–7.4)
NEUTROPHILS # BLD AUTO: 6.81 K/UL — SIGNIFICANT CHANGE UP (ref 1.8–7.4)
NEUTROPHILS NFR BLD AUTO: 61.4 % — SIGNIFICANT CHANGE UP (ref 43–77)
NEUTROPHILS NFR BLD AUTO: 61.4 % — SIGNIFICANT CHANGE UP (ref 43–77)
PLATELET # BLD AUTO: 248 K/UL — SIGNIFICANT CHANGE UP (ref 150–400)
PLATELET # BLD AUTO: 248 K/UL — SIGNIFICANT CHANGE UP (ref 150–400)
POTASSIUM SERPL-MCNC: 4.6 MMOL/L — SIGNIFICANT CHANGE UP (ref 3.5–5.3)
POTASSIUM SERPL-MCNC: 4.6 MMOL/L — SIGNIFICANT CHANGE UP (ref 3.5–5.3)
POTASSIUM SERPL-SCNC: 4.6 MMOL/L — SIGNIFICANT CHANGE UP (ref 3.5–5.3)
POTASSIUM SERPL-SCNC: 4.6 MMOL/L — SIGNIFICANT CHANGE UP (ref 3.5–5.3)
PROT SERPL-MCNC: 6.4 G/DL — LOW (ref 6.6–8.7)
PROT SERPL-MCNC: 6.4 G/DL — LOW (ref 6.6–8.7)
PROTHROM AB SERPL-ACNC: 11.4 SEC — SIGNIFICANT CHANGE UP (ref 9.5–13)
PROTHROM AB SERPL-ACNC: 11.4 SEC — SIGNIFICANT CHANGE UP (ref 9.5–13)
RBC # BLD: 2.5 M/UL — LOW (ref 4.2–5.8)
RBC # BLD: 2.5 M/UL — LOW (ref 4.2–5.8)
RBC # FLD: 13.5 % — SIGNIFICANT CHANGE UP (ref 10.3–14.5)
RBC # FLD: 13.5 % — SIGNIFICANT CHANGE UP (ref 10.3–14.5)
SODIUM SERPL-SCNC: 138 MMOL/L — SIGNIFICANT CHANGE UP (ref 135–145)
SODIUM SERPL-SCNC: 138 MMOL/L — SIGNIFICANT CHANGE UP (ref 135–145)
WBC # BLD: 11.1 K/UL — HIGH (ref 3.8–10.5)
WBC # BLD: 11.1 K/UL — HIGH (ref 3.8–10.5)
WBC # FLD AUTO: 11.1 K/UL — HIGH (ref 3.8–10.5)
WBC # FLD AUTO: 11.1 K/UL — HIGH (ref 3.8–10.5)

## 2023-11-26 PROCEDURE — 93010 ELECTROCARDIOGRAM REPORT: CPT

## 2023-11-26 PROCEDURE — 99291 CRITICAL CARE FIRST HOUR: CPT

## 2023-11-26 PROCEDURE — 71045 X-RAY EXAM CHEST 1 VIEW: CPT | Mod: 26

## 2023-11-26 PROCEDURE — 70498 CT ANGIOGRAPHY NECK: CPT | Mod: 26,MA

## 2023-11-26 PROCEDURE — 70450 CT HEAD/BRAIN W/O DYE: CPT | Mod: 26,MA,59

## 2023-11-26 PROCEDURE — 0042T: CPT | Mod: MA

## 2023-11-26 PROCEDURE — 70496 CT ANGIOGRAPHY HEAD: CPT | Mod: 26,MA

## 2023-11-26 RX ORDER — FLUORESCEIN SODIUM 9 MG
1 STRIP OPHTHALMIC (EYE) ONCE
Refills: 0 | Status: COMPLETED | OUTPATIENT
Start: 2023-11-26 | End: 2023-11-26

## 2023-11-26 RX ADMIN — Medication 1 DROP(S): at 12:30

## 2023-11-26 RX ADMIN — Medication 1 APPLICATION(S): at 12:30

## 2023-11-26 NOTE — PROGRESS NOTE ADULT - SUBJECTIVE AND OBJECTIVE BOX
LEONIDAS DALE  [id number]    History: 58y Male is status post left total knee arthroplasty with Dr. Prater, on POD # 5. Patient reports changes to right eye vision. Patient came to emergency department for evaluation. The patient's pain is controlled using the prescribed pain medications. The patient is participating in physical therapy. Denies CP, SOB, dizziness, HA, fever/chills, numbness or tingling.    Vital Signs Last 24 Hrs  T(C): 36.4 (26 Nov 2023 10:14), Max: 36.4 (26 Nov 2023 10:14)  T(F): 97.5 (26 Nov 2023 10:14), Max: 97.5 (26 Nov 2023 10:14)  HR: 79 (26 Nov 2023 10:14) (79 - 79)  BP: 116/62 (26 Nov 2023 10:14) (116/62 - 116/62)  BP(mean): --  RR: 18 (26 Nov 2023 10:14) (18 - 18)  SpO2: 98% (26 Nov 2023 10:14) (98% - 98%)    Parameters below as of 26 Nov 2023 10:14  Patient On (Oxygen Delivery Method): room air                              7.8    11.10 )-----------( 248      ( 26 Nov 2023 10:27 )             23.6     11-26    138  |  102  |  32.4<H>  ----------------------------<  121<H>  4.6   |  26.0  |  1.15    Ca    9.0      26 Nov 2023 10:27    TPro  6.4<L>  /  Alb  3.7  /  TBili  0.3<L>  /  DBili  x   /  AST  24  /  ALT  34  /  AlkPhos  89  11-26      General: Alert, awake, NAD  Physical exam: The left knee dressing is clean, dry and intact. No drainage, discharge, erythema, blistering or ecchymosis noted.   The calf is supple nontender b/l.   SILT. +AT/GC/EHL/FHL.   2+ DP pulse. BCR. No cyanosis.    Plan:   - continue ED work-up rule out stroke  - will confirm plan after images studies are completed  - Case d/w Dr. Prater

## 2023-11-26 NOTE — ED PROVIDER NOTE - PROGRESS NOTE DETAILS
JK - CT scans, labs WNL. Fluorescein stain performed, no corneal abrasion. VSS, ambulating on his own. Patient symptoms likely secondary to peripheral cause, central cause unlikely given 20/20 vision from L eye as well as negative imaging. Ready for DC with close opthalmology f.u. Currently stable.

## 2023-11-26 NOTE — ED PROVIDER NOTE - ATTENDING CONTRIBUTION TO CARE
I, Aleida Vu DO, have personally provided 35 minutes of critical care time exclusive of time spent on separately billable procedures. Time includes review of laboratory data, radiology results, discussion with consultants, and monitoring for potential decompensation. Interventions were performed as documented above.     58-year-old male with past medical history AISHA, hypertension, gout, obesity status post left total knee replacement on 11/22 presents for blurry vision in the left lower visual field of the right eye. He states his eye started bothering him while admitted to the hospital immediately following his surgery, but today he went to rub his eye and noticed blurry vision in only that quadrant. Denies pain. He did not notice the blurry vision last night, but his eye has been bothering him.    I agree with exam as documented.    Extensive visual field exam performed - patient has blurry vision in the left lower quadrant of the right eye only, no visual field loss or blurry vision in any visual field of left eye. As patient recently had surgery, will evaluate for possible stroke, although presenting symptoms are not consistent with stroke and LKW is unknown.    Eye stained without corneal abrasion - outpatient follow up to ophtho.

## 2023-11-26 NOTE — ED CLERICAL - NS ED CLERK NOTE PRE-ARRIVAL INFORMATION; ADDITIONAL PRE-ARRIVAL INFORMATION
This patient is enrolled in the comprehensive joint replacement (CJR) program and has active care navigation.  This patient can be followed up by the care navigation team within 24 hours. To arrange close follow-up or to obtain additional clinical information about this patient, please call the contact number above. Please call the orthopedic PA on call ([phone]) for ALL patients who may be admitted.

## 2023-11-26 NOTE — ED ADULT TRIAGE NOTE - CHIEF COMPLAINT QUOTE
Pt presents with right eye peripheral vison loss, pt unsure of last known well, but thinks it was Wednesday when he was discharged from hospital. Pt denies HA, dizziness, weakness, tingling. . Pt directed to CT

## 2023-11-26 NOTE — ED ADULT NURSE NOTE - OBJECTIVE STATEMENT
pt reports to the ED as activated code stroke. pt reports R eye blurry vision, woke up this morning and had difficulty ambulating and worsening blurry vision when looking down .pt reports he went to bed at 10 pm last night. pt reports he has noticed this blurry vision since d./c home from hospital s/p L knee replacement. pt denies fever, chills, back pain, n/v/d. pt brought right to CT on arrival. placed on CM

## 2023-11-26 NOTE — ED PROVIDER NOTE - CLINICAL SUMMARY MEDICAL DECISION MAKING FREE TEXT BOX
Patient is a 59 yo male with PMHx morbid obesity, AISHA, HTN, gout, L knee s/p TKR 11/22/23 presenting with R eye blurry vision. R lower visual field quadrantopia noted, no FND otherwise      Will check labs, r.o electrolyte abnormalities, CT brain perfusion to r.o stroke, CT angio H/N to r.o aneurysm, likely consult neuro/optho, reassess.

## 2023-11-26 NOTE — ED PROVIDER NOTE - PHYSICAL EXAMINATION
Constitutional: Well appearing, awake, alert, oriented to person, place, and time/situation and in no apparent distress  ENMT: Airway patent nasal mucosa clear. Mouth with normal mucosa. Throat has no vesicles no oropharyngeal exudates and uvula is midline. No blood in the oropharynx  EYES: clear bilaterally, pupils equal, round and reactive to light. L eye visual acuity 20/20, intact. R lower visual field impairment/quadrantopia, upper field WNL   Cardiac: Regular rate, regular rhythm. Heart sounds S1, S2. No murmurs, rubs or gallops. Good capillary refill, 2+ pulses, no peripheral edema  Respiratory: Lungs CTAB, no use of accessory muscles, no crackles, satting 99% on RA in no distress  Gastrointestinal: Abdomen nondistended, non-tender, no rebound guarding or peritoneal signs  Genitourinary: No CVA tenderness, pelvis nontender, bladder nondistended  Musculoskeletal: Spine appears normal, range of motion is not limited, no muscle or joint tenderness  Neurological: Alert and oriented, no focal deficits, no motor or sensory deficits. CN 2-12 intact, PERRLA, EOMI, No FND, moving all extremities equally, sensation intact, No dysmetria, no ataxia, negative heel-shin, 5/5 strength   Skin: L knee wound site c/d/i, no drainage no bleeding

## 2023-11-26 NOTE — ED PROVIDER NOTE - PATIENT PORTAL LINK FT
You can access the FollowMyHealth Patient Portal offered by [facility] by registering at the following website: [email or web address]. By joining [facility]’s FollowMyHealth portal, you will also be able to view your health information using other applications (apps) compatible with our system.

## 2023-11-26 NOTE — ED PROVIDER NOTE - OBJECTIVE STATEMENT
Patient is a 57 yo male with PMHx morbid obesity, AISHA, HTN, gout, L knee s/p TKR 11/22/23 presenting with R eye blurry vision. Patient states since being discharged s/p his L knee TKR he has had R eye blurry vision; patient went to sleep at approximately 10 PM last night. Patient woke up this morning with difficulty ambulating and R eye worsening blurry vision at which point he noted difficulty with his vision upon looking downwards with his R eye. Denies falls, blood thinners, syncope, headstrike. NIHSS 0. Denies fevers, chills, dizziness, lightheadedness, dysphagia, dysarthria, syncope, cough, congestion, SOB, CP, abdominal pain, neck pain, back pain, diarrhea, dysuria, hematuria, hematochezia, hematemesis, n/v, recent travel, sick contacts, leg swelling.

## 2023-11-26 NOTE — ED PROVIDER NOTE - NSFOLLOWUPINSTRUCTIONS_ED_ALL_ED_FT
Blurred Vision, Adult  Eyeglasses hovering over a Snellen eye chart. The glasses reveal clear letters, while the other letters are blurry.  A person having an eye exam. Eye equipment is placed on the face.  Having blurred vision means that you cannot see things clearly. Your vision may seem fuzzy or out of focus. It can involve your vision for objects that are close or far away. It may affect one or both eyes. There are many causes of blurred vision, including cataracts, macular degeneration, eye inflammation (uveitis), and diabetic retinopathy.    In many cases, blurred vision has to do with the shape of your eye. An abnormal eye shape means you cannot focus well (refractive error). When this happens, it can cause:  Faraway objects to look blurry (nearsightedness).  Close objects to look blurry (farsightedness).  Blurry vision at any distance (astigmatism).  Refractive errors are often corrected with glasses or contacts.    If you have blurred vision, it is best to see an eye care specialist. Blurred vision can be diagnosed based on your symptoms and a complete eye exam. Tell your eye care specialist about any other health problems you have, any recent eye injury, and any prior surgeries. You may need to see an eye care specialist who specializes in medical and surgical eye problems (ophthalmologist). Your treatment will depend on what is causing your blurred vision.    Follow these instructions at home:  Keep all follow-up visits. This is important. These include any visits to your eye specialists.  Do not drive or use machinery if your vision is blurry.  Use eye drops only as told by your health care provider.  If you were prescribed glasses or contact lenses, wear the glasses or contacts as told by your health care provider.  Schedule eye exams regularly.  Pay attention to any changes in your symptoms.  Avoid rubbing your eyes.  Contact a health care provider if:  Your symptoms do not improve or they get worse.  You have:  New symptoms.  A headache.  Trouble seeing at night.  Trouble noticing the difference between colors.  You notice:  Drooping of your eyelids.  Drainage coming from your eyes.  A rash around your eyes.  Get help right away if:  You have:  Severe eye pain.  A severe headache.  A sudden change in vision.  A sudden loss of vision.  A vision change after an injury.  You notice flashing lights in your field of vision. Your field of vision is the area that you can see without moving your eyes.  Summary  Having blurred vision means that you cannot see things clearly. Your vision may seem fuzzy or out of focus.  There are many causes of blurred vision. In many cases, blurred vision has to do with an abnormal eye shape (refractive error), and it can be corrected with glasses or contact lenses.  Pay attention to any changes in your symptoms. Contact a health care provider if your symptoms do not improve or if you have any new symptoms.  This information is not intended to replace advice given to you by your health care provider. Make sure you discuss any questions you have with your health care provider.

## 2023-11-26 NOTE — ED ADULT NURSE NOTE - NSFALLUNIVINTERV_ED_ALL_ED
Bed/Stretcher in lowest position, wheels locked, appropriate side rails in place/Call bell, personal items and telephone in reach/Instruct patient to call for assistance before getting out of bed/chair/stretcher/Non-slip footwear applied when patient is off stretcher/[place] to call system/Physically safe environment - no spills, clutter or unnecessary equipment/Purposeful proactive rounding/Room/bathroom lighting operational, light cord in reach

## 2023-11-27 ENCOUNTER — INPATIENT (INPATIENT)
Facility: HOSPITAL | Age: 58
LOS: 2 days | Discharge: HOME HEALTH | DRG: 123 | End: 2023-11-30
Attending: STUDENT IN AN ORGANIZED HEALTH CARE EDUCATION/TRAINING PROGRAM | Admitting: INTERNAL MEDICINE
Payer: MEDICARE

## 2023-11-27 VITALS
DIASTOLIC BLOOD PRESSURE: 71 MMHG | TEMPERATURE: 98 F | WEIGHT: 315 LBS | HEART RATE: 101 BPM | RESPIRATION RATE: 18 BRPM | SYSTOLIC BLOOD PRESSURE: 134 MMHG | HEIGHT: 74 IN | OXYGEN SATURATION: 98 %

## 2023-11-27 DIAGNOSIS — H53.40 UNSPECIFIED VISUAL FIELD DEFECTS: ICD-10-CM

## 2023-11-27 DIAGNOSIS — Z98.890 OTHER SPECIFIED POSTPROCEDURAL STATES: Chronic | ICD-10-CM

## 2023-11-27 PROBLEM — G47.33 OBSTRUCTIVE SLEEP APNEA (ADULT) (PEDIATRIC): Chronic | Status: ACTIVE | Noted: 2023-11-01

## 2023-11-27 LAB
ALBUMIN SERPL ELPH-MCNC: 3.7 G/DL — SIGNIFICANT CHANGE UP (ref 3.3–5.2)
ALBUMIN SERPL ELPH-MCNC: 3.7 G/DL — SIGNIFICANT CHANGE UP (ref 3.3–5.2)
ALP SERPL-CCNC: 103 U/L — SIGNIFICANT CHANGE UP (ref 40–120)
ALP SERPL-CCNC: 103 U/L — SIGNIFICANT CHANGE UP (ref 40–120)
ALT FLD-CCNC: 31 U/L — SIGNIFICANT CHANGE UP
ALT FLD-CCNC: 31 U/L — SIGNIFICANT CHANGE UP
ANION GAP SERPL CALC-SCNC: 11 MMOL/L — SIGNIFICANT CHANGE UP (ref 5–17)
ANION GAP SERPL CALC-SCNC: 11 MMOL/L — SIGNIFICANT CHANGE UP (ref 5–17)
APTT BLD: 31.4 SEC — SIGNIFICANT CHANGE UP (ref 24.5–35.6)
APTT BLD: 31.4 SEC — SIGNIFICANT CHANGE UP (ref 24.5–35.6)
AST SERPL-CCNC: 24 U/L — SIGNIFICANT CHANGE UP
AST SERPL-CCNC: 24 U/L — SIGNIFICANT CHANGE UP
BASOPHILS # BLD AUTO: 0.03 K/UL — SIGNIFICANT CHANGE UP (ref 0–0.2)
BASOPHILS # BLD AUTO: 0.03 K/UL — SIGNIFICANT CHANGE UP (ref 0–0.2)
BASOPHILS NFR BLD AUTO: 0.3 % — SIGNIFICANT CHANGE UP (ref 0–2)
BASOPHILS NFR BLD AUTO: 0.3 % — SIGNIFICANT CHANGE UP (ref 0–2)
BILIRUB SERPL-MCNC: 0.3 MG/DL — LOW (ref 0.4–2)
BILIRUB SERPL-MCNC: 0.3 MG/DL — LOW (ref 0.4–2)
BUN SERPL-MCNC: 29.9 MG/DL — HIGH (ref 8–20)
BUN SERPL-MCNC: 29.9 MG/DL — HIGH (ref 8–20)
CALCIUM SERPL-MCNC: 8.8 MG/DL — SIGNIFICANT CHANGE UP (ref 8.4–10.5)
CALCIUM SERPL-MCNC: 8.8 MG/DL — SIGNIFICANT CHANGE UP (ref 8.4–10.5)
CHLORIDE SERPL-SCNC: 102 MMOL/L — SIGNIFICANT CHANGE UP (ref 96–108)
CHLORIDE SERPL-SCNC: 102 MMOL/L — SIGNIFICANT CHANGE UP (ref 96–108)
CO2 SERPL-SCNC: 28 MMOL/L — SIGNIFICANT CHANGE UP (ref 22–29)
CO2 SERPL-SCNC: 28 MMOL/L — SIGNIFICANT CHANGE UP (ref 22–29)
CREAT SERPL-MCNC: 0.97 MG/DL — SIGNIFICANT CHANGE UP (ref 0.5–1.3)
CREAT SERPL-MCNC: 0.97 MG/DL — SIGNIFICANT CHANGE UP (ref 0.5–1.3)
CRP SERPL-MCNC: 62 MG/L — HIGH
CRP SERPL-MCNC: 62 MG/L — HIGH
EGFR: 90 ML/MIN/1.73M2 — SIGNIFICANT CHANGE UP
EGFR: 90 ML/MIN/1.73M2 — SIGNIFICANT CHANGE UP
EOSINOPHIL # BLD AUTO: 0.38 K/UL — SIGNIFICANT CHANGE UP (ref 0–0.5)
EOSINOPHIL # BLD AUTO: 0.38 K/UL — SIGNIFICANT CHANGE UP (ref 0–0.5)
EOSINOPHIL NFR BLD AUTO: 4.4 % — SIGNIFICANT CHANGE UP (ref 0–6)
EOSINOPHIL NFR BLD AUTO: 4.4 % — SIGNIFICANT CHANGE UP (ref 0–6)
ERYTHROCYTE [SEDIMENTATION RATE] IN BLOOD: 95 MM/HR — HIGH (ref 0–15)
ERYTHROCYTE [SEDIMENTATION RATE] IN BLOOD: 95 MM/HR — HIGH (ref 0–15)
GLUCOSE SERPL-MCNC: 132 MG/DL — HIGH (ref 70–99)
GLUCOSE SERPL-MCNC: 132 MG/DL — HIGH (ref 70–99)
HCT VFR BLD CALC: 24.6 % — LOW (ref 39–50)
HCT VFR BLD CALC: 24.6 % — LOW (ref 39–50)
HGB BLD-MCNC: 7.7 G/DL — LOW (ref 13–17)
HGB BLD-MCNC: 7.7 G/DL — LOW (ref 13–17)
IMM GRANULOCYTES NFR BLD AUTO: 0.7 % — SIGNIFICANT CHANGE UP (ref 0–0.9)
IMM GRANULOCYTES NFR BLD AUTO: 0.7 % — SIGNIFICANT CHANGE UP (ref 0–0.9)
INR BLD: 0.97 RATIO — SIGNIFICANT CHANGE UP (ref 0.85–1.18)
INR BLD: 0.97 RATIO — SIGNIFICANT CHANGE UP (ref 0.85–1.18)
LYMPHOCYTES # BLD AUTO: 1.78 K/UL — SIGNIFICANT CHANGE UP (ref 1–3.3)
LYMPHOCYTES # BLD AUTO: 1.78 K/UL — SIGNIFICANT CHANGE UP (ref 1–3.3)
LYMPHOCYTES # BLD AUTO: 20.7 % — SIGNIFICANT CHANGE UP (ref 13–44)
LYMPHOCYTES # BLD AUTO: 20.7 % — SIGNIFICANT CHANGE UP (ref 13–44)
MCHC RBC-ENTMCNC: 29.8 PG — SIGNIFICANT CHANGE UP (ref 27–34)
MCHC RBC-ENTMCNC: 29.8 PG — SIGNIFICANT CHANGE UP (ref 27–34)
MCHC RBC-ENTMCNC: 31.3 GM/DL — LOW (ref 32–36)
MCHC RBC-ENTMCNC: 31.3 GM/DL — LOW (ref 32–36)
MCV RBC AUTO: 95.3 FL — SIGNIFICANT CHANGE UP (ref 80–100)
MCV RBC AUTO: 95.3 FL — SIGNIFICANT CHANGE UP (ref 80–100)
MONOCYTES # BLD AUTO: 0.78 K/UL — SIGNIFICANT CHANGE UP (ref 0–0.9)
MONOCYTES # BLD AUTO: 0.78 K/UL — SIGNIFICANT CHANGE UP (ref 0–0.9)
MONOCYTES NFR BLD AUTO: 9.1 % — SIGNIFICANT CHANGE UP (ref 2–14)
MONOCYTES NFR BLD AUTO: 9.1 % — SIGNIFICANT CHANGE UP (ref 2–14)
NEUTROPHILS # BLD AUTO: 5.57 K/UL — SIGNIFICANT CHANGE UP (ref 1.8–7.4)
NEUTROPHILS # BLD AUTO: 5.57 K/UL — SIGNIFICANT CHANGE UP (ref 1.8–7.4)
NEUTROPHILS NFR BLD AUTO: 64.8 % — SIGNIFICANT CHANGE UP (ref 43–77)
NEUTROPHILS NFR BLD AUTO: 64.8 % — SIGNIFICANT CHANGE UP (ref 43–77)
PLATELET # BLD AUTO: 278 K/UL — SIGNIFICANT CHANGE UP (ref 150–400)
PLATELET # BLD AUTO: 278 K/UL — SIGNIFICANT CHANGE UP (ref 150–400)
POTASSIUM SERPL-MCNC: 4.7 MMOL/L — SIGNIFICANT CHANGE UP (ref 3.5–5.3)
POTASSIUM SERPL-MCNC: 4.7 MMOL/L — SIGNIFICANT CHANGE UP (ref 3.5–5.3)
POTASSIUM SERPL-SCNC: 4.7 MMOL/L — SIGNIFICANT CHANGE UP (ref 3.5–5.3)
POTASSIUM SERPL-SCNC: 4.7 MMOL/L — SIGNIFICANT CHANGE UP (ref 3.5–5.3)
PROT SERPL-MCNC: 6.5 G/DL — LOW (ref 6.6–8.7)
PROT SERPL-MCNC: 6.5 G/DL — LOW (ref 6.6–8.7)
PROTHROM AB SERPL-ACNC: 10.8 SEC — SIGNIFICANT CHANGE UP (ref 9.5–13)
PROTHROM AB SERPL-ACNC: 10.8 SEC — SIGNIFICANT CHANGE UP (ref 9.5–13)
RBC # BLD: 2.58 M/UL — LOW (ref 4.2–5.8)
RBC # BLD: 2.58 M/UL — LOW (ref 4.2–5.8)
RBC # FLD: 13.5 % — SIGNIFICANT CHANGE UP (ref 10.3–14.5)
RBC # FLD: 13.5 % — SIGNIFICANT CHANGE UP (ref 10.3–14.5)
SODIUM SERPL-SCNC: 141 MMOL/L — SIGNIFICANT CHANGE UP (ref 135–145)
SODIUM SERPL-SCNC: 141 MMOL/L — SIGNIFICANT CHANGE UP (ref 135–145)
WBC # BLD: 8.6 K/UL — SIGNIFICANT CHANGE UP (ref 3.8–10.5)
WBC # BLD: 8.6 K/UL — SIGNIFICANT CHANGE UP (ref 3.8–10.5)
WBC # FLD AUTO: 8.6 K/UL — SIGNIFICANT CHANGE UP (ref 3.8–10.5)
WBC # FLD AUTO: 8.6 K/UL — SIGNIFICANT CHANGE UP (ref 3.8–10.5)

## 2023-11-27 PROCEDURE — 70543 MRI ORBT/FAC/NCK W/O &W/DYE: CPT | Mod: 26,MA

## 2023-11-27 PROCEDURE — 99285 EMERGENCY DEPT VISIT HI MDM: CPT

## 2023-11-27 PROCEDURE — 70553 MRI BRAIN STEM W/O & W/DYE: CPT | Mod: 26,MA

## 2023-11-27 RX ORDER — ONDANSETRON 8 MG/1
1 TABLET, FILM COATED ORAL
Qty: 20 | Refills: 0
Start: 2023-11-27 | End: 2023-12-01

## 2023-11-27 RX ORDER — LIDOCAINE HCL 20 MG/ML
10 VIAL (ML) INJECTION ONCE
Refills: 0 | Status: COMPLETED | OUTPATIENT
Start: 2023-11-27 | End: 2023-11-27

## 2023-11-27 RX ORDER — OXYCODONE HYDROCHLORIDE 5 MG/1
30 TABLET ORAL ONCE
Refills: 0 | Status: DISCONTINUED | OUTPATIENT
Start: 2023-11-27 | End: 2023-11-27

## 2023-11-27 RX ORDER — FAMOTIDINE 10 MG/ML
1 INJECTION INTRAVENOUS
Qty: 28 | Refills: 0
Start: 2023-11-27 | End: 2023-12-10

## 2023-11-27 RX ADMIN — Medication 10 MILLILITER(S): at 23:06

## 2023-11-27 RX ADMIN — OXYCODONE HYDROCHLORIDE 30 MILLIGRAM(S): 5 TABLET ORAL at 23:34

## 2023-11-27 NOTE — ED PROVIDER NOTE - CLINICAL SUMMARY MEDICAL DECISION MAKING FREE TEXT BOX
h/o diabetes with change in vision of rt eye over the last 5 days; pt with negative work up for stroke , ct of head, cta of head and neck; pt seen by ophthalmology with b/l optic disc edema; case discussed with Dr Hodgson of neurology; pt to have lumbar puncture for evaluation of ICP; multiple attempts at lumbar puncture; pt to be admitted for lumbar puncture by IR and further work up of papilledema;

## 2023-11-27 NOTE — ED ADULT NURSE REASSESSMENT NOTE - NS ED NURSE REASSESS COMMENT FT1
Pt sitting quielty in stretcher NAD noted at this time pt given PO pain medications, ot tolerated well no new orders at this time pt safety ongoing.

## 2023-11-27 NOTE — ED ADULT NURSE REASSESSMENT NOTE - NS ED NURSE REASSESS COMMENT FT1
Recived Pt from [facility] pt sitting quietly in stretcher NAD noted at this time pt awaiting results. No orders at this time pt safety on going.

## 2023-11-27 NOTE — ED PROVIDER NOTE - OBJECTIVE STATEMENT
Tawanda Mcbride 58-year-old male past medical history of diabetes status post left knee replacement 1 week ago sent in by ophthalmology for evaluation decreased mentation and right.  Patient was seen in the ED yesterday workup including noncontrast CT of head CTA of head and neck which were noted to be negative.  Patient was seen by the ophthalmologist today and noted to have bilateral optic disc edema.  Patient sent in for further evaluation including MRI and lumbar puncture.  pt noted decreased in lower visual field of rt eye; over the last 2 days;

## 2023-11-27 NOTE — ED ADULT NURSE NOTE - NSFALLRISKINTERV_ED_ALL_ED

## 2023-11-27 NOTE — ED ADULT NURSE NOTE - CHIEF COMPLAINT QUOTE
pt c/o loss of vision to right eye started yesterday 8am,   A&OX3, resp wnl, had a MRI yesterday saw eye MD told to go to ED to see neuro, pt s/p left knee replacement 11/21

## 2023-11-27 NOTE — ED ADULT NURSE NOTE - OBJECTIVE STATEMENT
c/o right peripheral vision loss since Wednesday. Pt with recent left TKA, discharged Wednesday. Pt stated this vision changes started around then. Pt seen at [facility] yesterday with negative workup. Pt presents today for MRI and neuro fu. Pt Aox4, speaking coherently, able to move all extremities without weakness, respirations even and unlabored on RA, skin warm and dry.

## 2023-11-27 NOTE — ED ADULT NURSE NOTE - SUICIDE SCREENING QUESTION 1
Detail Level: Detailed Detail Level: Generalized Patient Specific Counseling (Will Not Stick From Patient To Patient): Follow up with podiatrist if causing pain Detail Level: Zone No

## 2023-11-28 ENCOUNTER — TRANSCRIPTION ENCOUNTER (OUTPATIENT)
Age: 58
End: 2023-11-28

## 2023-11-28 DIAGNOSIS — Z96.652 PRESENCE OF LEFT ARTIFICIAL KNEE JOINT: Chronic | ICD-10-CM

## 2023-11-28 LAB
ALBUMIN SERPL ELPH-MCNC: 3.4 G/DL — SIGNIFICANT CHANGE UP (ref 3.3–5.2)
ALBUMIN SERPL ELPH-MCNC: 3.4 G/DL — SIGNIFICANT CHANGE UP (ref 3.3–5.2)
ALP SERPL-CCNC: 75 U/L — SIGNIFICANT CHANGE UP (ref 40–120)
ALP SERPL-CCNC: 75 U/L — SIGNIFICANT CHANGE UP (ref 40–120)
ALT FLD-CCNC: 27 U/L — SIGNIFICANT CHANGE UP
ALT FLD-CCNC: 27 U/L — SIGNIFICANT CHANGE UP
ANION GAP SERPL CALC-SCNC: 8 MMOL/L — SIGNIFICANT CHANGE UP (ref 5–17)
ANION GAP SERPL CALC-SCNC: 8 MMOL/L — SIGNIFICANT CHANGE UP (ref 5–17)
APPEARANCE CSF: CLEAR — SIGNIFICANT CHANGE UP
APPEARANCE CSF: CLEAR — SIGNIFICANT CHANGE UP
APTT BLD: 29.4 SEC — SIGNIFICANT CHANGE UP (ref 24.5–35.6)
APTT BLD: 29.4 SEC — SIGNIFICANT CHANGE UP (ref 24.5–35.6)
AST SERPL-CCNC: 20 U/L — SIGNIFICANT CHANGE UP
AST SERPL-CCNC: 20 U/L — SIGNIFICANT CHANGE UP
BILIRUB SERPL-MCNC: 0.4 MG/DL — SIGNIFICANT CHANGE UP (ref 0.4–2)
BILIRUB SERPL-MCNC: 0.4 MG/DL — SIGNIFICANT CHANGE UP (ref 0.4–2)
BLD GP AB SCN SERPL QL: SIGNIFICANT CHANGE UP
BLD GP AB SCN SERPL QL: SIGNIFICANT CHANGE UP
BUN SERPL-MCNC: 27.2 MG/DL — HIGH (ref 8–20)
BUN SERPL-MCNC: 27.2 MG/DL — HIGH (ref 8–20)
CALCIUM SERPL-MCNC: 8.9 MG/DL — SIGNIFICANT CHANGE UP (ref 8.4–10.5)
CALCIUM SERPL-MCNC: 8.9 MG/DL — SIGNIFICANT CHANGE UP (ref 8.4–10.5)
CHLORIDE SERPL-SCNC: 102 MMOL/L — SIGNIFICANT CHANGE UP (ref 96–108)
CHLORIDE SERPL-SCNC: 102 MMOL/L — SIGNIFICANT CHANGE UP (ref 96–108)
CO2 SERPL-SCNC: 29 MMOL/L — SIGNIFICANT CHANGE UP (ref 22–29)
CO2 SERPL-SCNC: 29 MMOL/L — SIGNIFICANT CHANGE UP (ref 22–29)
COLOR CSF: SIGNIFICANT CHANGE UP
COLOR CSF: SIGNIFICANT CHANGE UP
CREAT SERPL-MCNC: 0.98 MG/DL — SIGNIFICANT CHANGE UP (ref 0.5–1.3)
CREAT SERPL-MCNC: 0.98 MG/DL — SIGNIFICANT CHANGE UP (ref 0.5–1.3)
CSF PCR RESULT: SIGNIFICANT CHANGE UP
CSF PCR RESULT: SIGNIFICANT CHANGE UP
EGFR: 89 ML/MIN/1.73M2 — SIGNIFICANT CHANGE UP
EGFR: 89 ML/MIN/1.73M2 — SIGNIFICANT CHANGE UP
EOSINOPHIL # CSF: 9 % — SIGNIFICANT CHANGE UP
EOSINOPHIL # CSF: 9 % — SIGNIFICANT CHANGE UP
GLUCOSE CSF-MCNC: 76 MG/DLG/24H — HIGH (ref 40–70)
GLUCOSE CSF-MCNC: 76 MG/DLG/24H — HIGH (ref 40–70)
GLUCOSE SERPL-MCNC: 113 MG/DL — HIGH (ref 70–99)
GLUCOSE SERPL-MCNC: 113 MG/DL — HIGH (ref 70–99)
GRAM STN FLD: SIGNIFICANT CHANGE UP
GRAM STN FLD: SIGNIFICANT CHANGE UP
HCT VFR BLD CALC: 22.7 % — LOW (ref 39–50)
HCT VFR BLD CALC: 22.7 % — LOW (ref 39–50)
HGB BLD-MCNC: 7.3 G/DL — LOW (ref 13–17)
HGB BLD-MCNC: 7.3 G/DL — LOW (ref 13–17)
LDH CSF L TO P-CCNC: 28 U/L — SIGNIFICANT CHANGE UP
LDH CSF L TO P-CCNC: 28 U/L — SIGNIFICANT CHANGE UP
LDH FLD-CCNC: 28 U/L — SIGNIFICANT CHANGE UP
LDH FLD-CCNC: 28 U/L — SIGNIFICANT CHANGE UP
LYMPHOCYTES # CSF: 25 % — LOW (ref 40–80)
LYMPHOCYTES # CSF: 25 % — LOW (ref 40–80)
MCHC RBC-ENTMCNC: 30.3 PG — SIGNIFICANT CHANGE UP (ref 27–34)
MCHC RBC-ENTMCNC: 30.3 PG — SIGNIFICANT CHANGE UP (ref 27–34)
MCHC RBC-ENTMCNC: 32.2 GM/DL — SIGNIFICANT CHANGE UP (ref 32–36)
MCHC RBC-ENTMCNC: 32.2 GM/DL — SIGNIFICANT CHANGE UP (ref 32–36)
MCV RBC AUTO: 94.2 FL — SIGNIFICANT CHANGE UP (ref 80–100)
MCV RBC AUTO: 94.2 FL — SIGNIFICANT CHANGE UP (ref 80–100)
MONOS+MACROS NFR CSF: 8 % — LOW (ref 15–45)
MONOS+MACROS NFR CSF: 8 % — LOW (ref 15–45)
NEUTROPHILS # CSF: 58 % — HIGH (ref 0–6)
NEUTROPHILS # CSF: 58 % — HIGH (ref 0–6)
NRBC NFR CSF: 7 /UL — HIGH (ref 0–5)
NRBC NFR CSF: 7 /UL — HIGH (ref 0–5)
PLATELET # BLD AUTO: 269 K/UL — SIGNIFICANT CHANGE UP (ref 150–400)
PLATELET # BLD AUTO: 269 K/UL — SIGNIFICANT CHANGE UP (ref 150–400)
POTASSIUM SERPL-MCNC: 4.6 MMOL/L — SIGNIFICANT CHANGE UP (ref 3.5–5.3)
POTASSIUM SERPL-MCNC: 4.6 MMOL/L — SIGNIFICANT CHANGE UP (ref 3.5–5.3)
POTASSIUM SERPL-SCNC: 4.6 MMOL/L — SIGNIFICANT CHANGE UP (ref 3.5–5.3)
POTASSIUM SERPL-SCNC: 4.6 MMOL/L — SIGNIFICANT CHANGE UP (ref 3.5–5.3)
PROT CSF-MCNC: 28 MG/DL — SIGNIFICANT CHANGE UP (ref 15–45)
PROT CSF-MCNC: 28 MG/DL — SIGNIFICANT CHANGE UP (ref 15–45)
PROT SERPL-MCNC: 6.2 G/DL — LOW (ref 6.6–8.7)
PROT SERPL-MCNC: 6.2 G/DL — LOW (ref 6.6–8.7)
RBC # BLD: 2.41 M/UL — LOW (ref 4.2–5.8)
RBC # BLD: 2.41 M/UL — LOW (ref 4.2–5.8)
RBC # CSF: 680 /CMM — HIGH (ref 0–1)
RBC # CSF: 680 /CMM — HIGH (ref 0–1)
RBC # FLD: 13.6 % — SIGNIFICANT CHANGE UP (ref 10.3–14.5)
RBC # FLD: 13.6 % — SIGNIFICANT CHANGE UP (ref 10.3–14.5)
SODIUM SERPL-SCNC: 139 MMOL/L — SIGNIFICANT CHANGE UP (ref 135–145)
SODIUM SERPL-SCNC: 139 MMOL/L — SIGNIFICANT CHANGE UP (ref 135–145)
SPECIMEN SOURCE: SIGNIFICANT CHANGE UP
SPECIMEN SOURCE: SIGNIFICANT CHANGE UP
T PALLIDUM AB TITR SER: NEGATIVE — SIGNIFICANT CHANGE UP
T PALLIDUM AB TITR SER: NEGATIVE — SIGNIFICANT CHANGE UP
TUBE TYPE: SIGNIFICANT CHANGE UP
TUBE TYPE: SIGNIFICANT CHANGE UP
WBC # BLD: 7.82 K/UL — SIGNIFICANT CHANGE UP (ref 3.8–10.5)
WBC # BLD: 7.82 K/UL — SIGNIFICANT CHANGE UP (ref 3.8–10.5)
WBC # FLD AUTO: 7.82 K/UL — SIGNIFICANT CHANGE UP (ref 3.8–10.5)
WBC # FLD AUTO: 7.82 K/UL — SIGNIFICANT CHANGE UP (ref 3.8–10.5)

## 2023-11-28 PROCEDURE — 62328 DX LMBR SPI PNXR W/FLUOR/CT: CPT

## 2023-11-28 PROCEDURE — 99223 1ST HOSP IP/OBS HIGH 75: CPT

## 2023-11-28 PROCEDURE — 73562 X-RAY EXAM OF KNEE 3: CPT | Mod: 26,LT

## 2023-11-28 PROCEDURE — 99223 1ST HOSP IP/OBS HIGH 75: CPT | Mod: FS

## 2023-11-28 RX ORDER — ONDANSETRON 8 MG/1
4 TABLET, FILM COATED ORAL EVERY 8 HOURS
Refills: 0 | Status: DISCONTINUED | OUTPATIENT
Start: 2023-11-28 | End: 2023-11-30

## 2023-11-28 RX ORDER — OMEPRAZOLE 10 MG/1
1 CAPSULE, DELAYED RELEASE ORAL
Refills: 0 | DISCHARGE

## 2023-11-28 RX ORDER — PANTOPRAZOLE SODIUM 20 MG/1
40 TABLET, DELAYED RELEASE ORAL
Refills: 0 | Status: DISCONTINUED | OUTPATIENT
Start: 2023-11-28 | End: 2023-11-30

## 2023-11-28 RX ORDER — ACETAZOLAMIDE 250 MG/1
500 TABLET ORAL
Refills: 0 | Status: DISCONTINUED | OUTPATIENT
Start: 2023-11-28 | End: 2023-11-30

## 2023-11-28 RX ORDER — LOSARTAN POTASSIUM 100 MG/1
100 TABLET, FILM COATED ORAL DAILY
Refills: 0 | Status: DISCONTINUED | OUTPATIENT
Start: 2023-11-28 | End: 2023-11-28

## 2023-11-28 RX ORDER — TAMSULOSIN HYDROCHLORIDE 0.4 MG/1
1 CAPSULE ORAL
Refills: 0 | DISCHARGE

## 2023-11-28 RX ORDER — OXYCODONE HYDROCHLORIDE 5 MG/1
1 TABLET ORAL
Refills: 0 | DISCHARGE

## 2023-11-28 RX ORDER — FAMOTIDINE 10 MG/ML
20 INJECTION INTRAVENOUS
Refills: 0 | Status: DISCONTINUED | OUTPATIENT
Start: 2023-11-28 | End: 2023-11-30

## 2023-11-28 RX ORDER — ALLOPURINOL 300 MG
300 TABLET ORAL DAILY
Refills: 0 | Status: DISCONTINUED | OUTPATIENT
Start: 2023-11-28 | End: 2023-11-30

## 2023-11-28 RX ORDER — LANOLIN ALCOHOL/MO/W.PET/CERES
3 CREAM (GRAM) TOPICAL AT BEDTIME
Refills: 0 | Status: DISCONTINUED | OUTPATIENT
Start: 2023-11-28 | End: 2023-11-30

## 2023-11-28 RX ORDER — OXYCODONE HYDROCHLORIDE 5 MG/1
30 TABLET ORAL EVERY 6 HOURS
Refills: 0 | Status: DISCONTINUED | OUTPATIENT
Start: 2023-11-28 | End: 2023-11-30

## 2023-11-28 RX ORDER — TAMSULOSIN HYDROCHLORIDE 0.4 MG/1
0.4 CAPSULE ORAL AT BEDTIME
Refills: 0 | Status: DISCONTINUED | OUTPATIENT
Start: 2023-11-28 | End: 2023-11-30

## 2023-11-28 RX ORDER — LOSARTAN POTASSIUM 100 MG/1
100 TABLET, FILM COATED ORAL DAILY
Refills: 0 | Status: DISCONTINUED | OUTPATIENT
Start: 2023-11-28 | End: 2023-11-30

## 2023-11-28 RX ORDER — OXYCODONE HYDROCHLORIDE 5 MG/1
30 TABLET ORAL EVERY 6 HOURS
Refills: 0 | Status: DISCONTINUED | OUTPATIENT
Start: 2023-11-28 | End: 2023-11-28

## 2023-11-28 RX ORDER — ACETAMINOPHEN 500 MG
650 TABLET ORAL EVERY 6 HOURS
Refills: 0 | Status: DISCONTINUED | OUTPATIENT
Start: 2023-11-28 | End: 2023-11-30

## 2023-11-28 RX ADMIN — FAMOTIDINE 20 MILLIGRAM(S): 10 INJECTION INTRAVENOUS at 18:51

## 2023-11-28 RX ADMIN — TAMSULOSIN HYDROCHLORIDE 0.4 MILLIGRAM(S): 0.4 CAPSULE ORAL at 21:19

## 2023-11-28 RX ADMIN — FAMOTIDINE 20 MILLIGRAM(S): 10 INJECTION INTRAVENOUS at 07:01

## 2023-11-28 RX ADMIN — OXYCODONE HYDROCHLORIDE 30 MILLIGRAM(S): 5 TABLET ORAL at 07:00

## 2023-11-28 RX ADMIN — LOSARTAN POTASSIUM 100 MILLIGRAM(S): 100 TABLET, FILM COATED ORAL at 07:00

## 2023-11-28 RX ADMIN — Medication 300 MILLIGRAM(S): at 11:44

## 2023-11-28 RX ADMIN — OXYCODONE HYDROCHLORIDE 30 MILLIGRAM(S): 5 TABLET ORAL at 11:53

## 2023-11-28 RX ADMIN — PANTOPRAZOLE SODIUM 40 MILLIGRAM(S): 20 TABLET, DELAYED RELEASE ORAL at 07:01

## 2023-11-28 RX ADMIN — ACETAZOLAMIDE 500 MILLIGRAM(S): 250 TABLET ORAL at 18:52

## 2023-11-28 RX ADMIN — OXYCODONE HYDROCHLORIDE 30 MILLIGRAM(S): 5 TABLET ORAL at 18:52

## 2023-11-28 NOTE — PHYSICAL THERAPY INITIAL EVALUATION ADULT - ADDITIONAL COMMENTS
Pt reports living in private home with wife who is able to assist. Pt has 3 FELICITA with no handrail and 6+6 stairs inside with handrail. Independent with SAC prior to admission. Owns RW, cane, and raised toilet seat.

## 2023-11-28 NOTE — PROGRESS NOTE ADULT - SUBJECTIVE AND OBJECTIVE BOX
[facility] Division of Hospital Medicine      SUBJECTIVE / OVERNIGHT EVENTS:  No events. Patient awaiting LP. No new vidsual field changes. Denies headaches    Patient denies chest pain, SOB, abd pain, N/V, fever, chills, dysuria or any other complaints. All remainder ROS negative.     MEDICATIONS  (STANDING):  allopurinol 300 milliGRAM(s) Oral daily  famotidine    Tablet 20 milliGRAM(s) Oral two times a day  losartan 100 milliGRAM(s) Oral daily  pantoprazole    Tablet 40 milliGRAM(s) Oral before breakfast  tamsulosin 0.4 milliGRAM(s) Oral at bedtime    MEDICATIONS  (PRN):  acetaminophen     Tablet .. 650 milliGRAM(s) Oral every 6 hours PRN Temp greater or equal to 38C (100.4F), Mild Pain (1 - 3)  aluminum hydroxide/magnesium hydroxide/simethicone Suspension 30 milliLiter(s) Oral every 4 hours PRN Dyspepsia  melatonin 3 milliGRAM(s) Oral at bedtime PRN Insomnia  ondansetron Injectable 4 milliGRAM(s) IV Push every 8 hours PRN Nausea and/or Vomiting  oxyCODONE    IR 30 milliGRAM(s) Oral every 6 hours PRN Moderate to Severe Pain        I&O's Summary      PHYSICAL EXAM:  Vital Signs Last 24 Hrs  T(C): 36.9 (28 Nov 2023 11:50), Max: 36.9 (28 Nov 2023 11:50)  T(F): 98.4 (28 Nov 2023 11:50), Max: 98.4 (28 Nov 2023 11:50)  HR: 80 (28 Nov 2023 11:50) (75 - 101)  BP: 103/74 (28 Nov 2023 11:50) (103/74 - 135/76)  BP(mean): --  RR: 17 (28 Nov 2023 11:50) (17 - 18)  SpO2: 98% (28 Nov 2023 11:50) (96% - 99%)    Parameters below as of 28 Nov 2023 11:50  Patient On (Oxygen Delivery Method): room air            CONSTITUTIONAL: NAD, appears stated age  ENMT: Moist oral mucosa, no pharyngeal injection or exudates; normal dentition  RESPIRATORY: Normal respiratory effort; clear to auscultation bilaterally  CARDIOVASCULAR: Regular rate and rhythm, normal S1 and S2, no murmur/rub/gallop; Peripheral pulses are 2+ bilaterally  ABDOMEN: Nontender to palpation, normoactive bowel sounds, no rebound/guarding;   MUSCLOSKELETAL:  No clubbing or cyanosis of digits; no joint swelling or tenderness to palpation  PSYCH: A+O to person, place, and time; affect appropriate  NEUROLOGY: right inferior visual field deficits   SKIN: No rashes; no palpable lesions    LABS:                        7.3    7.82  )-----------( 269      ( 28 Nov 2023 06:53 )             22.7     11-28    139  |  102  |  27.2<H>  ----------------------------<  113<H>  4.6   |  29.0  |  0.98    Ca    8.9      28 Nov 2023 06:53    TPro  6.2<L>  /  Alb  3.4  /  TBili  0.4  /  DBili  x   /  AST  20  /  ALT  27  /  AlkPhos  75  11-28    PT/INR - ( 27 Nov 2023 17:20 )   PT: 10.8 sec;   INR: 0.97 ratio         PTT - ( 28 Nov 2023 06:53 )  PTT:29.4 sec      Urinalysis Basic - ( 28 Nov 2023 06:53 )    Color: x / Appearance: x / SG: x / pH: x  Gluc: 113 mg/dL / Ketone: x  / Bili: x / Urobili: x   Blood: x / Protein: x / Nitrite: x   Leuk Esterase: x / RBC: x / WBC x   Sq Epi: x / Non Sq Epi: x / Bacteria: x        CAPILLARY BLOOD GLUCOSE            RADIOLOGY & ADDITIONAL TESTS:  Results Reviewed:   Imaging Personally Reviewed:  Electrocardiogram Personally Reviewed:

## 2023-11-28 NOTE — H&P ADULT - NS ATTEND AMEND GEN_ALL_CORE FT
58yoM hx morbid obesity, AISHA not on CPAP, HTN, recent L-TKR on 11/22/2023, presenting with R-eye visual loss in lower visual fields for several days, s/p outpatient opthalmology evaluation showing papilledema sent to hospital for further management.  CT/MRI neuroimaging without acute pathology.  LP attempted twice by ED and unsuccessful.  ED spoke with neurologist on call, Dr. Hodgson who advised LP by IR and to hold off on acetazolamide.  Pt also with acute anemia, likely related to acute blood loss from recent orthopedic surgery.  R-knee with swelling and some mild ecchymotic area, consulted orthopedic team for follow up on if expected post-operative swelling or if further imaging needs to be done to assess for leg hematoma. 58yoM hx morbid obesity, AISHA not on CPAP, HTN, recent L-TKR on 11/22/2023, presenting with R-eye visual loss in lower visual fields for several days, s/p outpatient opthalmology evaluation showing papilledema sent to hospital for further management.  Presentation concerning for idiopathic intracranial HTN. CT/MRI neuroimaging without acute pathology.  LP attempted twice by ED and unsuccessful.  ED spoke with neurologist on call, Dr. Hodgson who advised LP by IR and to hold off on acetazolamide for now.  Pt also with acute anemia, likely related to acute blood loss from recent orthopedic surgery.  R-knee with swelling and some mild ecchymotic area, consulted orthopedic team for follow up on if expected post-operative swelling or if further imaging needs to be done to assess for leg hematoma.

## 2023-11-28 NOTE — H&P ADULT - ASSESSMENT
Assessment:   58 year old male with PMHx of Morbid obestiy, AISHA, Demetris's esophagus, HTN, Gout, L knee s/p TKR 11/22/23 presenting with R eye blurry vision x7 days, admitted for b/l papilledema w/ R vision loss.     Plan:     #Bilateral papilledema w/ R vision loss- r/o idiopathic intracranial hypertension  -Admit to tele   -Outpatient opthalmology noted b/l papilledema   -CTH/CTA/CTP negative on 11/26   -MRI brain/orbit negative  -LP unsuccessful in ED, IR consulted for LP   -Hold ASA and VTE prophylaxis for LP  -NPO for procedure  -Check labs  -ESR: 95, CRP: 62, can be elevated from recent L TKR  -[id number]  -Hold Acetazolamide until after LP   -Neuro consulted, recs appreciated     #Acute Anemia  -Most likely due to recent L TKR  -HGB baseline (12.6/37.6 on 11/1/23 on [facility])  -Denies hematochezia, melena, hematoemesis   -Trend H/H   -Check T&S   -Blood consent obtained    # B/L knee pain, s/p L TKR  -c/w oxycodone q6h prn pain   -hold celebrex 200 mg BID, ASA 81 mg QD w/ pending LP     #HTN  -olmesartan 40 mg QD --> losartan 100 mg QD   -Trend BP    # Demetris' s esophagus   -noted on [facility] GI progress note 11/4/21   -c/w famotidine 20 mg QD   -omeprazole 40 mg --> pantoprazole 40 mg QD     #Urinary retention   -c/w tamsulosin .4 mg QD    #Gout  -c/w allopurinol 300 mg QD    #AISHA  -Currently not on CPAP  -Sees Dr. Askew outpatient  -Consider CPAP during hospital course     VTE prophylaxis: SCD for now, hold lovenox w/ pending LP      Assessment:   58 year old male with PMHx of Morbid obestiy, AISHA, Demetris's esophagus, HTN, Gout, L knee s/p TKR 11/22/23 presenting with R eye blurry vision x7 days, admitted for b/l papilledema w/ R vision loss.     Plan:     #Bilateral papilledema w/ R vision loss- r/o idiopathic intracranial hypertension  -Admit to tele   -Outpatient opthalmology noted b/l papilledema   -CTH/CTA/CTP negative on 11/26   -MRI brain/orbit negative  -LP unsuccessful in ED, IR consulted for LP   -Hold ASA and VTE prophylaxis for LP  -NPO for procedure  -Check labs  -ESR: 95, CRP: 62, can be elevated from recent L TKR  -[id number]  -Hold Acetazolamide until after LP   -Neuro consulted, recs appreciated     #Acute Anemia  -Most likely due to recent L TKR  -HGB baseline (12.6/37.6 on 11/1/23 on [facility])  -Denies hematochezia, melena, hematoemesis   -Trend H/H   -Check T&S   -Blood consent obtained    # B/L knee pain, s/p L TKR w/ edema  -c/w oxycodone q6h prn pain   -hold celebrex 200 mg BID, ASA 81 mg QD w/ pending LP   -Ortho consulted       #HTN  -olmesartan 40 mg QD --> losartan 100 mg QD   -Trend BP    # Demetris' s esophagus   -noted on [facility] GI progress note 11/4/21   -c/w famotidine 20 mg QD   -omeprazole 40 mg --> pantoprazole 40 mg QD     #Urinary retention   -c/w tamsulosin .4 mg QD    #Gout  -c/w allopurinol 300 mg QD    #AISHA  -Currently not on CPAP  -Sees Dr. Askew outpatient  -Consider CPAP during hospital course     VTE prophylaxis: SCD for now, hold lovenox w/ pending LP      58 year old male with PMHx of Morbid obesity AISHA, Lamas's esophagus, HTN, Gout, L knee s/p TKR 11/22/23 presenting with R eye visual deficit in lower fields for several days, s/p outpatient ophthalmology eval showing b/l papilledema, sent to hospital for further management    Bilateral papilledema w/ R vision loss  -Concern for idiopathic intracranial hypertension  -CTH/CTA/CTP negative on 11/26   -MRI brain/orbit negative  -LP unsuccessful in ED, IR consulted for LP   -Hold ASA and VTE prophylaxis for LP  -NPO for procedure  -Check labs  -ESR: 95, CRP: 62, ?elevated from recent L TKR  -[id number]  -Hold Acetazolamide until after LP as per neurology  -Neuro consulted by ED, to follow in AM     Acute Anemia  -Most likely due to recent L TKR  -HGB baseline (12.6/37.6 on 11/1/23 on [facility])  -Denies hematochezia, melena, hematemesis   -Trend H/H   -Check T&S   -Blood consent obtained    B/L knee pain, s/p L TKR w/ edema  -c/w oxycodone q6h prn pain   -hold celebrex 200 mg BID, ASA 81 mg QD w/ pending LP   -Ortho consulted     HTN  -olmesartan 40 mg QD --> losartan 100 mg QD   -Trend BP    [place]; esophagus   -noted on [facility] progress note 11/4/21   -c/w famotidine 20 mg QD   -omeprazole 40 mg --> pantoprazole 40 mg QD     Urinary retention   -c/w tamsulosin .4 mg QD    Gout  -c/w allopurinol 300 mg QD    AISHA  -Currently not on CPAP  -Sees Dr. Askew outpatient    VTE prophylaxis  -SCD for now, hold lovenox w/ pending LP

## 2023-11-28 NOTE — CONSULT NOTE ADULT - ASSESSMENT
INCOMPLETE NOTE   ASSESSMENT:   58 year old male w/ PMHx of morbid obestiy, AISHA (not on CPAP), Demetris's esophagus, HTN, gout, L knee s/p TKR 11/22/23 presenting to [facility] ED on 11/27 for Right eye blurry vision for approximately 7 days. Pt stated he noticed decreased Right eye in the lower quadrant after he was discharged from the hospital s/p Left knee total knee re-construction. Patient was seen prior in the ED for had a negative work up; CTH, CTA H/N and CTP. Patient was discharged home and referred to Ophthalmology. He saw the ophthalmologist who recommended he could back to the ED for bilateral papilledema. Patient denied dysphagia, dysarthria, gait abnormality photophobia, headache or neck stiffness. Patient had an unremarkable workup and the stroke team was consulted for further management.     NEURO:   -Neurologically ---   -Continue close monitoring for neurologic deterioration    - Stroke neuro checks q _   - Permissive HTN or  SBP goal 120 -140mmHg, patient appears ........   -ANTITHROMBOTIC THERAPY:   -titrate statin to LDL goal less than 70  -MRV head pending to rule out CVT   -Temporal Arthritis ultrasound pending   -LP to rule out IIH   -MRI Brain w/o noted  -MRI orbit noted    -Dysphagia screen: pass/fail   -Physical therapy/OT/Speech eval/treatment.       CARDIOVASCULAR:   -Check TTE, cardiac monitoring w/ telemetry for now, further evaluation pending findings of noted workup                              HEMATOLOGY:   -H/H 7.3/22.7, Platelets 269, patient should have all age and risk appropriate malignancy screenings with PCP or sooner if clinically suspected   -DVT ppx: Heparin s.c [] LMWH []     PULMONARY:   -CXR noted, on room air, protecting airway, saturating well     RENAL:   -BUN/Cr 27.2/0.98, monitor urine output, maintain adequate hydration    -Na Goal:  135-145         ID:   -Afebrile, no leukocytosis, monitor for si/sx of infection     OTHER:    -Condition and plan of care d/w patient, questions and concerns addressed.     DISPOSITION:   -Rehab or home depending on PT eval once stable and workup is complete      CORE MEASURES:        Admission NIHSS:      Tenecteplase : [] YES [x] NO      LDL/HDL/A1C:     Depression Screen- if depression hx and/or present      Statin Therapy:     Dysphagia Screen: [] PASS [] FAIL     Smoking [] YES [] NO      Afib [] YES [] NO     Stroke Education [] YES [] NO    Obtain screening lower extremity venous ultrasound in patients who meet 1 or more of the following criteria as patient is high risk for DVT/PE on admission:   [] History of DVT/PE  []Hypercoagulable states (Factor V Leiden, Cancer, OCP, etc. )  []Prolonged immobility (hemiplegia/hemiparesis/post operative or any other extended immobilization)  [] Transferred from outside facility (Rehab or Long term care)  [] Age </= to 50 ASSESSMENT:   58 year old male w/ PMHx of morbid obestiy, AISHA (not on CPAP), Demetris's esophagus, HTN, gout, L knee s/p TKR 11/22/23 presenting to [facility] ED on 11/27 for Right eye blurry vision for approximately 7 days. Pt stated he noticed decreased Right eye in the upper quadrant after he was discharged from the hospital s/p Left knee total knee re-construction. Patient was seen prior in the ED for had a negative work up; CTH, CTA H/N and CTP. Patient was discharged home and referred to Ophthalmology. He saw the ophthalmologist who recommended he could back to the ED for bilateral papilledema. Patient denied dysphagia, dysarthria, gait abnormality photophobia, headache or neck stiffness. Patient had an unremarkable workup and the stroke team was consulted for further management.     NEURO:   -Neurologically with Right superior vision field cut   -Continue close monitoring for neurologic deterioration    - Stroke neuro checks q 4hr    - SBP goal 120 -140mmHg, patient appears to be tolerating this range without acute neurological changes  -ANTITHROMBOTIC THERAPY:   -titrate statin to LDL goal less than 70  -CTV head pending to rule out IIH   -Temporal Arthritis ultrasound pending   -LP done, Opening pressure 37 cmH2O, closing pressure 28 cmH2O  -Recommend Diamox 500 BID  -Monitor LFTs, QTc and Chemistry   -Recommend Bariatic surgery   -MRI Brain w/o noted  -MRI orbit noted  -Dysphagia screen: pass/fail   -Physical therapy/OT/Speech eval/treatment.       CARDIOVASCULAR:   -Check TTE, cardiac monitoring w/ telemetry for now, further evaluation pending findings of noted workup                              HEMATOLOGY:   -H/H 7.3/22.7, Platelets 269, patient should have all age and risk appropriate malignancy screenings with PCP or sooner if clinically suspected   -Recommend LE duplex  given recent Left TKR   -DVT ppx: Heparin s.c [] LMWH [] SCDs [x]     PULMONARY:   -CXR noted, on room air, protecting airway, saturating well     RENAL:   -BUN/Cr 27.2/0.98, monitor urine output, maintain adequate hydration    -Na Goal:  135-145         ID:   -Afebrile, no leukocytosis, monitor for si/sx of infection     OTHER:    -Condition and plan of care d/w patient, questions and concerns addressed.   -Recommend Weight loss education -  5-10% or more     DISPOSITION:   -Rehab or home depending on PT eval once stable and workup is complete      CORE MEASURES:        Admission NIHSS:      Tenecteplase : [] YES [x] NO      LDL/HDL/A1C:     Depression Screen- if depression hx and/or present      Statin Therapy:     Dysphagia Screen: [] PASS [] FAIL     Smoking [] YES [] NO      Afib [] YES [] NO     Stroke Education [] YES [] NO    Obtain screening lower extremity venous ultrasound in patients who meet 1 or more of the following criteria as patient is high risk for DVT/PE on admission:   [] History of DVT/PE  []Hypercoagulable states (Factor V Leiden, Cancer, OCP, etc. )  [x]Prolonged immobility (hemiplegia/hemiparesis/post operative or any other extended immobilization)  [] Transferred from outside facility (Rehab or Long term care)  [] Age </= to 50 ASSESSMENT:   58 year old male w/ PMHx of morbid obestiy, AISHA (not on CPAP), Demetris's esophagus, HTN, gout, L knee s/p TKR 11/22/23 presenting to [facility] ED on 11/27 for Right eye blurry vision for approximately 7 days. Pt stated he noticed decreased Right eye in the upper quadrant after he was discharged from the hospital s/p Left knee total knee re-construction. Patient was seen prior in the ED for had a negative work up; CTH, CTA H/N and CTP. Patient was discharged home and referred to Ophthalmology. He saw the ophthalmologist who recommended he could back to the ED for bilateral papilledema. Patient denied dysphagia, dysarthria, gait abnormality photophobia, headache or neck stiffness. Patient had an unremarkable workup and the stroke team was consulted for further management.     NEURO:   -Neurologically with Right superior vision field cut   -Continue close monitoring for neurologic deterioration    - Stroke neuro checks q 4hr    - SBP goal 120 -140mmHg, patient appears to be tolerating this range without acute neurological changes  -ANTITHROMBOTIC THERAPY:   -titrate statin to LDL goal less than 70  -CTV head pending to rule out IIH   -Temporal Arthritis ultrasound pending   -LP done, Opening pressure 37 cmH2O, closing pressure 28 cmH2O  -Recommend Diamox 500 BID  -Monitor LFTs, QTc and Chemistry   -MRI Brain w/o noted  -MRI orbit noted  -Dysphagia screen: pass/fail   -Physical therapy/OT/Speech eval/treatment.       CARDIOVASCULAR:   -Check TTE, cardiac monitoring w/ telemetry for now, further evaluation pending findings of noted workup                              HEMATOLOGY:   -H/H 7.3/22.7, Platelets 269, patient should have all age and risk appropriate malignancy screenings with PCP or sooner if clinically suspected   -Recommend LE duplex  given recent Left TKR   -DVT ppx: Heparin s.c [] LMWH [] SCDs [x]     PULMONARY:   -CXR noted, on room air, protecting airway, saturating well     RENAL:   -BUN/Cr 27.2/0.98, monitor urine output, maintain adequate hydration    -Na Goal:  135-145         ID:   -Afebrile, no leukocytosis, monitor for si/sx of infection     OTHER:    -Condition and plan of care d/w patient, questions and concerns addressed.   -Recommend Weight loss education -  5-10% or more goven elevated ICP possible pseudotumor cerebri    DISPOSITION:   -Rehab or home depending on PT eval once stable and workup is complete      CORE MEASURES:        Admission NIHSS:      Tenecteplase : [] YES [x] NO      LDL/HDL/A1C:     Depression Screen- if depression hx and/or present      Statin Therapy:     Dysphagia Screen: [] PASS [] FAIL     Smoking [] YES [] NO      Afib [] YES [] NO     Stroke Education [] YES [] NO    Obtain screening lower extremity venous ultrasound in patients who meet 1 or more of the following criteria as patient is high risk for DVT/PE on admission:   [] History of DVT/PE  []Hypercoagulable states (Factor V Leiden, Cancer, OCP, etc. )  [x]Prolonged immobility (hemiplegia/hemiparesis/post operative or any other extended immobilization)  [] Transferred from outside facility (Rehab or Long term care)  [] Age </= to 50

## 2023-11-28 NOTE — H&P ADULT - HISTORY OF PRESENT ILLNESS
58 year old male w/ PMHx of morbid obestiy, AISHA (not on CPAP), Demetris's esophagus, HTN, gout, L knee s/p TKR 11/22/23 presenting with R eye blurry vision x7 days. Pt states his R eye is decreased in the inferior/lower quadrant of his R eye after he was d/c for his L knee TKR on 11/22/23. He states he continued to have R vision loss of the inferior quadrant consistently and came to the hospital on 11/26 to be evaluated. Code stroke called with NIH O, and negative CTH/CTA/CTP. Pt was referred to outpatient opthamology. [facility] who referred him to the ED for b/l papilledema evaluation, recommending neuro. Pt denies fevers, chills, CP, palpitations, SOB, dyspnea, dizziness, lightheadedness, dysphagia, dysarthria, syncope, neck pain/stiffness, abdominal pain, N/V/D, dysuria, hematuria, hematochezia, hematemesis, or other complaints at this time.    58 year old male w/ PMHx of morbid obestiy, AISHA (not on CPAP), Demetris's esophagus, HTN, gout, L knee s/p TKR 11/22/23 presenting with R eye blurry vision x7 days. Pt states his R eye is decreased in the inferior/lower quadrant of his R eye after he was d/c for his L knee TKR on 11/22/23. He states he continued to have R vision loss of the inferior quadrant consistently and came to the hospital on 11/26 to be evaluated. Code stroke called with NIH O, and negative CTH/CTA/CTP. Pt was referred to outpatient opthamology. He saw an [facility] who referred him to the ED for b/l papilledema evaluation, recommending neuro. Pt denies fevers, chills, CP, palpitations, SOB, dyspnea, dizziness, lightheadedness, photophobia,  neck pain/stiffness, dysphagia, dysarthria, syncope,abdominal pain, N/V/D, dysuria, hematuria, hematochezia, hematemesis, or other complaints at this time. FHx sister has MS.    58 year old male w/ PMHx of morbid obestiy, AISHA (not on CPAP), Demetris's esophagus, HTN, gout, L knee s/p TKR 11/22/23 presenting with R eye blurry vision x7 days. Pt states his R eye is decreased in the inferior/lower quadrant of his R eye after he was d/c for his L knee TKR on 11/22/23. He states he continued to have R vision loss of the inferior quadrant consistently and came to the hospital on 11/26 to be evaluated. Code stroke called with NIH O, and negative CTH/CTA/CTP. Pt was referred to outpatient opthalmology He saw an ophthalmologist at [facility] on 11/27 who referred him to the ED for b/l papilledema evaluation, recommending neuro. Pt denies fevers, chills, CP, palpitations, SOB, dyspnea, dizziness, lightheadedness, photophobia,  neck pain/stiffness, dysphagia, dysarthria, syncope, abdominal pain, N/V/D, dysuria, hematuria, hematochezia, hematemesis, or other complaints at this time. FHx sister has MS.

## 2023-11-28 NOTE — ED ADULT NURSE REASSESSMENT NOTE - NS ED NURSE REASSESS COMMENT FT1
Report given to JULIETA RN pt resting quielty in stretcher NAD noted at this time pt awaiting admission bed no new orders at this time pt safety ongoing.

## 2023-11-28 NOTE — H&P ADULT - NSHPLABSRESULTS_GEN_ALL_CORE
11-27    141  |  102  |  29.9<H>  ----------------------------<  132<H>  4.7   |  28.0  |  0.97    Ca    8.8      27 Nov 2023 17:20    TPro  6.5<L>  /  Alb  3.7  /  TBili  0.3<L>  /  DBili  x   /  AST  24  /  ALT  31  /  AlkPhos  103  11-27                            7.7    8.60  )-----------( 278      ( 27 Nov 2023 17:20 )             24.6

## 2023-11-28 NOTE — CONSULT NOTE ADULT - SUBJECTIVE AND OBJECTIVE BOX
Pt Name: LEONIDAS DALE    MRN: [id number]    History: 58y Male is status post left total knee arthroplasty with Dr. Prater, on 11/21/23. Patient reports changes to right eye vision. Patient came to emergency department for evaluation on 11/26/23 stroke workup was performed and results negative. Patient was discharged. Patient returned again to [facility] ED as symptoms have not improved and was getting concerned. The patient's pain is controlled using the prescribed pain medications. The patient is participating in physical therapy at home. No notable increase in swelling or pain in left knee per patient report, states that he is doing well. No fall or trauma to left knee. Denies CP, SOB, dizziness, HA, fever/chills, numbness or tingling.      REVIEW OF SYSTEMS    General: Alert, responsive, in NAD    Skin/Breast: No rashes, no pruritis     Musculoskeletal: SEE HPI.      PAST MEDICAL & SURGICAL HISTORY:  Gout      HTN (hypertension)      Obstructive sleep apnea      Lamas esophagus      H/O arthroscopy of knee      H/O total knee replacement, left          Allergies: penicillin (Rash)  shellfish (Anaphylaxis)      Medications: acetaminophen     Tablet .. 650 milliGRAM(s) Oral every 6 hours PRN  allopurinol 300 milliGRAM(s) Oral daily  aluminum hydroxide/magnesium hydroxide/simethicone Suspension 30 milliLiter(s) Oral every 4 hours PRN  famotidine    Tablet 20 milliGRAM(s) Oral two times a day  losartan 100 milliGRAM(s) Oral daily  melatonin 3 milliGRAM(s) Oral at bedtime PRN  ondansetron Injectable 4 milliGRAM(s) IV Push every 8 hours PRN  oxyCODONE    IR 30 milliGRAM(s) Oral every 6 hours  pantoprazole    Tablet 40 milliGRAM(s) Oral before breakfast  tamsulosin 0.4 milliGRAM(s) Oral at bedtime                          7.7    8.60  )-----------( 278      ( 27 Nov 2023 17:20 )             24.6       11-27    141  |  102  |  29.9<H>  ----------------------------<  132<H>  4.7   |  28.0  |  0.97    Ca    8.8      27 Nov 2023 17:20    TPro  6.5<L>  /  Alb  3.7  /  TBili  0.3<L>  /  DBili  x   /  AST  24  /  ALT  31  /  AlkPhos  103  11-27      Vital Signs Last 24 Hrs  T(C): 36.8 (28 Nov 2023 04:55), Max: 36.8 (27 Nov 2023 15:31)  T(F): 98.2 (28 Nov 2023 04:55), Max: 98.3 (27 Nov 2023 15:31)  HR: 82 (28 Nov 2023 04:55) (82 - 101)  BP: 135/76 (28 Nov 2023 04:55) (134/71 - 135/76)  BP(mean): --  RR: 18 (28 Nov 2023 04:55) (18 - 18)  SpO2: 99% (28 Nov 2023 04:55) (98% - 99%)    Parameters below as of 28 Nov 2023 04:55  Patient On (Oxygen Delivery Method): room air        Daily Height in cm: 187.96 (27 Nov 2023 15:31)    Daily       PHYSICAL EXAM:      Appearance: Alert, responsive, in no acute distress.    Neurological: Sensation is grossly intact to light touch. No focal deficits or weaknesses found.    Skin: no rash on visible skin. Skin is clean, dry and intact. No bleeding. No abrasions. No ulcerations.    Vascular: 2+ distal pulses. Cap refill < 2 sec. No signs of venous insufficiency or stasis. No extremity ulcerations. No cyanosis.    Musculoskeletal:         Left Lower Extremity: The left knee dressing is clean, dry and intact. No drainage, discharge, erythema, blistering or ecchymosis noted. Mepilex removed, incision with prineo tape intact, no active bleeding or signs of infection, new Mepilex applied over incision. + Diffuse swelling around left knee, mild ecchymosis noted.  The calf is supple nontender b/l.   SILT. +AT/GC/EHL/FHL on left. ankle DF/PF intact on left   2+ DP pulse on left. BCR. No cyanosis.       Right Lower Extremity: Moving spontaneously no obvious signs of trauma     Imaging Studies: PRELIM PA READ  Xray left knee shows stable left TKA, no obvious signs of fracture or dislocation     A/P:  Pt is a  58y Male s/p L TKA POD#  PLAN:   * Pain control   * Normal post operative swelling to left knee, recommend ice packs  * PT consulted once cleared by medical team  * Recommend continuing [id number] BID for DVTp   * Recommend continuing celebrex 200mg BID  * F/u outpt with Dr. Prater at scheduled appointment   * Remaining care per medicine team  * D/W Dr. Prater ortho attending

## 2023-11-28 NOTE — H&P ADULT - NSICDXPASTMEDICALHX_GEN_ALL_CORE_FT
Wheelchair/Stroller
PAST MEDICAL HISTORY:  Lamas esophagus     Gout     HTN (hypertension)     Obstructive sleep apnea

## 2023-11-28 NOTE — CONSULT NOTE ADULT - NS ATTEND AMEND GEN_ALL_CORE FT
Seen and examined with the ACP team. Plan discussed with ACPs.    I agree with assessment and plan  as written with modifications made above.    will follow with you    Alexis Hodgson MD PhD   [id number]

## 2023-11-28 NOTE — H&P ADULT - NSHPPHYSICALEXAM_GEN_ALL_CORE
T(C): 36.8 (11-27-23 @ 15:31), Max: 36.8 (11-27-23 @ 15:31)  HR: 101 (11-27-23 @ 15:31) (101 - 101)  BP: 134/71 (11-27-23 @ 15:31) (134/71 - 134/71)  RR: 18 (11-27-23 @ 15:31) (18 - 18)  SpO2: 98% (11-27-23 @ 15:31) (98% - 98%)    CONSTITUTIONAL: Well groomed, no apparent distress  EYES: PERRLA and symmetric, EOMI,  No conjunctival or scleral injection, non-icteric  ENMT: Oral mucosa with moist membranes. Normal dentition; no pharyngeal injection or exudates             NECK: Supple, symmetric and without tracheal deviation, negative kernig sign  RESP: No respiratory distress, no use of accessory muscles; CTA b/l, no WRR  CV: RRR, +S1S2, no MRG; no JVD;  peripheral edema  GI: Soft, NT, ND, no rebound, no guarding  MSK: 5/5 strength of the UE/ LE B/L however decreased L knee ROM 2/2 recent L TKR  SKIN: No rashes or ulcers noted  NEURO: CN II-XII intact; normal reflexes in upper and lower extremities, sensation intact in upper and lower extremities b/l to light touch.  R quadrantopsia of the lateral and medial aspect on the R lower vision. Pt is able to do finger to nose test bilaterally however when asked to cover L eye while performing task, he is unable to see my finger in the inferior/lower R visual field. Able to perform heel to shin but  has  decreased ROM performing on L side 2/2 L TKR. no facial palsy, no slurred speech.   PSYCH: Appropriate insight/judgment; A+O x 3, mood and affect appropriate, recent/remote memory intact T(C): 36.8 (11-27-23 @ 15:31), Max: 36.8 (11-27-23 @ 15:31)  HR: 101 (11-27-23 @ 15:31) (101 - 101)  BP: 134/71 (11-27-23 @ 15:31) (134/71 - 134/71)  RR: 18 (11-27-23 @ 15:31) (18 - 18)  SpO2: 98% (11-27-23 @ 15:31) (98% - 98%)    CONSTITUTIONAL: Well groomed, no apparent distress  EYES: PERRLA and symmetric, EOMI,  No conjunctival or scleral injection, non-icteric  ENMT: Oral mucosa with moist membranes. Normal dentition; no pharyngeal injection or exudates             NECK: Supple, symmetric and without tracheal deviation, negative kernig sign  RESP: No respiratory distress, no use of accessory muscles; CTA b/l, no WRR  CV: RRR, +S1S2, no MRG; no JVD;  peripheral edema  GI: Soft, NT, ND, no rebound, no guarding  MSK: 5/5 strength of the UE/ LE B/L however decreased L knee ROM 2/2 recent L TKR, L knee edema  SKIN: No rashes or ulcers noted  NEURO: CN II-XII intact; normal reflexes in upper and lower extremities, sensation intact in upper and lower extremities b/l to light touch.  R quadrantopsia of the lateral and medial aspect on the R lower vision. Pt is able to do finger to nose test bilaterally however when asked to cover L eye while performing task, he is unable to see my finger in the inferior/lower R visual field. Able to perform heel to shin but  has  decreased ROM performing on L side 2/2 L TKR. no facial palsy, no slurred speech.   PSYCH: Appropriate insight/judgment; A+O x 3, mood and affect appropriate, recent/remote memory intact T(C): 36.8 (11-27-23 @ 15:31), Max: 36.8 (11-27-23 @ 15:31)  HR: 101 (11-27-23 @ 15:31) (101 - 101)  BP: 134/71 (11-27-23 @ 15:31) (134/71 - 134/71)  RR: 18 (11-27-23 @ 15:31) (18 - 18)  SpO2: 98% (11-27-23 @ 15:31) (98% - 98%)    CONSTITUTIONAL: Well groomed, no apparent distress  EYES: PERRLA and symmetric, EOMI,  No conjunctival or scleral injection, non-icteric  ENMT: Oral mucosa with moist membranes. Normal dentition; no pharyngeal injection or exudates             NECK: Supple, symmetric and without tracheal deviation, negative kernig sign  RESP: No respiratory distress, no use of accessory muscles; CTA b/l, no WRR  CV: RRR, +S1S2, no MRG; no JVD;  peripheral edema  GI: Soft, NT, ND, no rebound, no guarding  MSK: 5/5 strength of the UE/ LE B/L however decreased L knee ROM 2/2 recent L TKR, L knee edema  SKIN: No rashes or ulcers noted  NEURO: CN II-XII intact; normal reflexes in upper and lower extremities, sensation intact in upper and lower extremities b/l to light touch.  visual deficit of lateral and medial aspect on the R eye. Pt is able to do finger to nose test bilaterally however when asked to cover L eye while performing task, he is unable to see my finger in the inferior/lower R visual field. Able to perform heel to shin but  has  decreased ROM performing on L side 2/2 L TKR. no facial palsy, no slurred speech.   PSYCH: Appropriate insight/judgment; A+O x 3, mood and affect appropriate, recent/remote memory intact

## 2023-11-28 NOTE — H&P ADULT - NSICDXFAMILYHX_GEN_ALL_CORE_FT
FAMILY HISTORY:  Sibling  Still living? Unknown  FH: multiple sclerosis, Age at diagnosis: Age Unknown

## 2023-11-28 NOTE — PATIENT PROFILE ADULT - NSPROIMPLANTSMEDDEV_GEN_A_NUR
AMBULATORY CASE MANAGEMENT NOTE    Name and Relationship of Patient/Support Person: Greyson Schofield L - Self    CCM Interim Update    Completed assessment and set care plans with patient, addressed needs and he is aware of f/u with PCP this week. Will support for cane, B/P cuff needed and he wants to see if he can get an O2 saturation monitor from insurance coverage. He prefers cane and not a walker or Rolator at this time. Reports he sees [facility] to discuss heart transplant and will be having a liver biopsy done as well. His concern is having he want and drive to do things and his body just unable to do them. He wants to know what is making him so sore and week. Discussed CHR and low ejection fraction. Will ask PCP about a referral for Palliative program for CHF.     Care Coordination    15:47 Outreached to [facility] by Stevenson at [phone] to attempt to get him his free blood pressure cuff. I ws transferred x 4 and discussed need with 4 people and no one could resolve issue or understand and kept sending me to the wrong department, after 9 minutes I hung up and called the [employer] EX[employer] Benefits & rewards number at [phone] as per the tri-fold he is eligible for a free blood pressure cuff per year. I was on hold 6 minutes for this number and had to hang up. Will share information with patient and try again later in week.         Adult Patient Profile  Questions/Answers      Flowsheet Row Most Recent Value   Symptoms/Conditions Managed at Home respiratory, chronic pain, cardiovascular, behavioral health, gastrointestinal   Barriers to Managing Health none   Cardiovascular Symptoms/Conditions heart failure   Cardiovascular Management Strategies medication therapy   Cardiovascular Self-Management Outcome 2 (bad)   Cardiovascular Comment [facility] doctors discussing heart transplant and will have liver biopsy   Gastrointestinal Symptoms/Conditions abdominal pain, distension   Gastrointestinal Management Strategies  other (see comments)  [liver issue with cirrhosis]   Gastrointestinal Self-Management Outcome 2 (bad)   Gastrointestinal Comment needs liver transplant   Chronic Pain Goal/Acceptable Level 4   Chronic Pain Baseline with Activity 8   Chronic Pain Location neck, arms, back, hands   Chronic Pain Frequency constant   Chronic Pain Quality aching   Chronic Pain Onset/Duration new with pain in body areas   Chronic Pain Associated Signs/Symptoms weakness, anxiety   Chronic Pain Self-Management Outcome 2 (bad)   Chronic Pain Comment tylenol for pain management   Respiratory Symptoms/Conditions shortness of breath, other (see comments)  [CHF]   Respiratory Management Strategies activity   Respiratory Self-Management Outcome 3 (uncertain)   Respiratory Comment reports O2 saturations can go low   Behavioral Health Symptoms/Conditions anxiety   Identifying Life Goals children, 3 boys, and lives with second son - live a little longer   Identifying Health Goals managing stress, anxiety or depression, keep illness under control   Taking Medications Not Prescribed yes, over-the-counter medicines, yes, vitamins and/or minerals   Vitamins/Minerals Vitamin B6   OTC Medications pain or fever relief, like Aleve, Motrin, Advil, Tylenol   Missed Doses of Prescribed Medications During Past Week no   Taken Prescribed Medications at Different Time or Schedule During Past Week yes  [maybe late]   Taken More or Less Medication Than Prescribed no   Barriers to Taking Medication as Prescribed none   Describe Yourself outgoing and motivated   Recent Change in Mood/Behavior (improve or decline) concentration, other (see comments)   Major Change/Loss/Stressor/Fears medical condition, self   Behaviors When Feeling Stressed/Fearful newly developed CHF with EF of 10% and inability to do a lot of ADLs for himself now without having pain or tiredness   Pre-existing AND/MOST/POLST Order No   Primary Source of Support/Comfort child(lois), extended family    Name of Support/Comfort Primary Source second son he lives with him and his cousin she supports him with rides and support for doctor   People in Home child(lois), adult   Family Caregiver if Needed child(lois), adult, other relative(s)   Family Caregiver Names Ritu cheung   Current Living Arrangements home          Adult Wellness Assessment  Questions/Answers      Flowsheet Row Most Recent Value   Current Feelings of Stress quite a bit          Social Work Assessment  Questions/Answers      Flowsheet Row Most Recent Value   People in Home child(lois), adult   Current Living Arrangements home   Primary Care Provided by self   Provides Primary Care For no one, unable/limited ability to care for self   Family Caregiver if Needed child(lois), adult, other relative(s)   Family Caregiver Names Ritu cheung   Behavioral Health Symptoms/Conditions anxiety          Send Education  Questions/Answers      Flowsheet Row Most Recent Value   Annual Wellness Visit:  Patient Has Completed   Other Patient Education/Resources  Advanced Care Planning, MyChart   ACP Education Method Send Materials   MyChart Education Method Verbal   Advanced Directives: Send Materials          SDOH updated and reviewed with the patient during this program:  Social Connections: Moderately Isolated    Frequency of Communication with Friends and Family: More than three times a week    Frequency of Social Gatherings with Friends and Family: More than three times a week    Attends Sabianism Services: More than 4 times per year    Active Member of Clubs or Organizations: No    Attends Club or Organization Meetings: Never    Marital Status:       Housing Stability: High Risk    Unable to Pay for Housing in the Last Year: No    Number of Places Lived in the Last Year: 4    Unstable Housing in the Last Year: No      Stress: Stress Concern Present    Feeling of Stress : Very much       Education Documentation  safety, taught by Zbigniew  JOHN Tomlinson at 9/25/2023  2:47 PM.  Learner: Patient  Readiness: Acceptance  Method: Explanation, Teach Back  Response: Verbalizes Understanding    medication management, taught by Bushra Coy RN at 9/25/2023  2:47 PM.  Learner: Patient  Readiness: Acceptance  Method: Explanation, Teach Back  Response: Verbalizes Understanding    drug/alcohol use, taught by Bushra Coy RN at 9/25/2023  2:47 PM.  Learner: Patient  Readiness: Acceptance  Method: Explanation, Teach Back  Response: Verbalizes Understanding    coping strategies, taught by Bushra Coy RN at 9/25/2023  2:47 PM.  Learner: Patient  Readiness: Acceptance  Method: Explanation, Teach Back  Response: Verbalizes Understanding    activity, taught by Bushra Coy RN at 9/25/2023  2:47 PM.  Learner: Patient  Readiness: Acceptance  Method: Explanation, Teach Back  Response: Verbalizes Understanding          Bushra CHEN  Ambulatory Case Management    9/25/2023, 14:48 EDT   Metal/plastic rods in L knee

## 2023-11-28 NOTE — PHYSICAL THERAPY INITIAL EVALUATION ADULT - RANGE OF MOTION EXAMINATION, REHAB EVAL
negative
except left knee n/a/bilateral upper extremity ROM was WNL (within normal limits)/bilateral lower extremity was ROM was WNL (within normal limits)

## 2023-11-28 NOTE — BRIEF OPERATIVE NOTE - OPERATION/FINDINGS
Opening pressure 37 cmH20. CSF blood-tinged. 28cc CSF removed. closing pressure [id number]. Opening pressure 37 cmH20. CSF blood-tinged. 28cc CSF removed. closing pressure [id number]. Opening pressure 37 cmH20. CSF blood-tinged. 28cc CSF removed. closing pressure [id number].

## 2023-11-28 NOTE — CONSULT NOTE ADULT - SUBJECTIVE AND OBJECTIVE BOX
INCOMPLETE NOTE   Preliminary note, official note pending attending review/signature.                               [facility] Stroke Team    CC: Right vision loss  HPI:  58 year old male w/ PMHx of morbid obestiy, AISHA (not on CPAP), Demetris's esophagus, HTN, gout, L knee s/p TKR 11/22/23 presenting to [facility] ED on 11/27 for Right eye blurry vision for approximately 7 days. Pt stated he noticed decreased Right eye in the lower quadrant after he was discharged from the hospital s/p Left knee total knee re-construction. Patient was seen prior in the ED for had a negative work up; CTH, CTA H/N and CTP. Patient was discharged home and referred to Ophthalmology. He saw the ophthalmologist who recommended he could back to the ED for bilateral papilledema. Patient denied dysphagia, dysarthria, gait abnormality photophobia, headache or neck stiffness. Patient had an unremarkable workup and the stroke team was consulted for further management.       PAST MEDICAL & SURGICAL HISTORY:  Gout  HTN (hypertension)  Obstructive sleep apnea  Lamas esophagus  H/O arthroscopy of knee  H/O total knee replacement, left          MEDICATIONS  (STANDING):  allopurinol 300 milliGRAM(s) Oral daily  famotidine    Tablet 20 milliGRAM(s) Oral two times a day  losartan 100 milliGRAM(s) Oral daily  pantoprazole    Tablet 40 milliGRAM(s) Oral before breakfast  tamsulosin 0.4 milliGRAM(s) Oral at bedtime    MEDICATIONS  (PRN):  acetaminophen     Tablet .. 650 milliGRAM(s) Oral every 6 hours PRN Temp greater or equal to 38C (100.4F), Mild Pain (1 - 3)  aluminum hydroxide/magnesium hydroxide/simethicone Suspension 30 milliLiter(s) Oral every 4 hours PRN Dyspepsia  melatonin 3 milliGRAM(s) Oral at bedtime PRN Insomnia  ondansetron Injectable 4 milliGRAM(s) IV Push every 8 hours PRN Nausea and/or Vomiting  oxyCODONE    IR 30 milliGRAM(s) Oral every 6 hours PRN Moderate to Severe Pain      Allergies    penicillin (Rash)  shellfish (Anaphylaxis)  Intolerances        SOCIAL HISTORY:  no tob,   no alcohol   no drugs    FAMILY HISTORY:  FH: multiple sclerosis (Sibling)          ROS: 14 point ROS negative other than what is present in HPI or below    Vital Signs Last 24 Hrs  T(C): 36.6 (28 Nov 2023 08:43), Max: 36.8 (27 Nov 2023 15:31)  T(F): 97.8 (28 Nov 2023 08:43), Max: 98.3 (27 Nov 2023 15:31)  HR: 75 (28 Nov 2023 08:43) (75 - 101)  BP: 104/63 (28 Nov 2023 08:43) (104/63 - 135/76)  BP(mean): --  RR: 17 (28 Nov 2023 08:43) (17 - 18)  SpO2: 96% (28 Nov 2023 08:43) (96% - 99%)    Parameters below as of 28 Nov 2023 08:43  Patient On (Oxygen Delivery Method): room air      EXAM PENDING     General: NAD    Detailed Neurologic Exam:    Mental status: The patient is awake and alert and has normal attention span.  The patient is fully oriented in 3 spheres. The patient is oriented to current events. The patient is able to name objects, follow commands, repeat sentences.    Cranial nerves: Pupils equal and react symmetrically to light. There is no visual field deficit to confrontation. Extraocular motion is full with no nystagmus. There is no ptosis. Facial sensation is intact. Facial musculature is symmetric. Palate elevates symmetrically. Tongue is midline.    Motor: There is normal bulk and tone.  There is no tremor.  Strength is 5/5 in the right arm and leg.   Strength is 5/5 in the left arm and leg.    Sensation: Intact to light touch and pin in 4 extremities    Reflexes: 1-2+ throughout and plantar responses are flexor.    Cerebellar: There is no dysmetria on finger to nose testing.    Gait : deferred        NIH SS:  DATE:  TIME:  1A: Level of consciousness (0-3):   1B: Questions (0-2):   1C: Commands (0-2):   2: Gaze (0-2):   3: Visual fields (0-3):   4: Facial palsy (0-3):   MOTOR:  5A: Left arm motor drift (0-4):   5B: Right arm motor drift (0-4):   6A: Left leg motor drift (0-4):   6B: Right leg motor drift (0-4):   7: Limb ataxia (0-2):   SENSORY:  8: Sensation (0-2):   SPEECH:  9: Language (0-3):   10: Dysarthria (0-2):   EXTINCTION:  11: Extinction/inattention (0-2):     TOTAL SCORE:     prehospital mRS=      LABS:                         7.3    7.82  )-----------( 269      ( 28 Nov 2023 06:53 )             22.7       11-28    139  |  102  |  27.2<H>  ----------------------------<  113<H>  4.6   |  29.0  |  0.98    Ca    8.9      28 Nov 2023 06:53    TPro  6.2<L>  /  Alb  3.4  /  TBili  0.4  /  DBili  x   /  AST  20  /  ALT  27  /  AlkPhos  75  11-28      PT/INR - ( 27 Nov 2023 17:20 )   PT: 10.8 sec;   INR: 0.97 ratio         PTT - ( 28 Nov 2023 06:53 )  PTT:29.4 sec    Lipid panel:    HgbA1c:      RADIOLOGY & ADDITIONAL STUDIES (independently reviewed unless otherwise noted):      MR Head w/wo IV Cont (11.27.23 @ 18:58)   IMPRESSION: Evaluation of the orbits and brain appears unremarkable.      CT Brain Stroke Protocol (11.26.23 @ 10:58)   IMPRESSION:    CT Head: No acute intracranial hemorrhage, mass effect or demarcated   territorial infarction.      The findings were discussed with Dr. Askew by Dr. Richard Mahoney on   11/26/2023 11:08 AM    CT Perfusion: Normal CT perfusion    Intracranial CTA: No large vessel occlusion. Examination is degraded by   suboptimal contrast bolus    Extracranial CTA: No high-grade steno-occlusive disease. Examination is   degraded by suboptimal contrast bolus.       Xray Chest 1 View- PORTABLE-Urgent (11.26.23 @ 11:56)   IMPRESSION:   No radiographic evidence of active chest disease.       Preliminary note, official note pending attending review/signature.                               [facility] Stroke Team    CC: Right vision loss  HPI:  58 year old male w/ PMHx of morbid obestiy, AISHA (not on CPAP), Demetris's esophagus, HTN, gout, L knee s/p TKR 11/22/23 presenting to [facility] ED on 11/27 for Right eye blurry vision for approximately 7 days. Pt stated he noticed decreased Right eye in the lower quadrant after he was discharged from the hospital s/p Left knee total knee re-construction. Patient was seen prior in the ED for had a negative work up; CTH, CTA H/N and CTP. Patient was discharged home and referred to Ophthalmology. He saw the ophthalmologist who recommended he could back to the ED for bilateral papilledema. Patient denied dysphagia, dysarthria, gait abnormality photophobia, headache or neck stiffness. Patient had an unremarkable workup and the stroke team was consulted for further management.       PAST MEDICAL & SURGICAL HISTORY:  Gout  HTN (hypertension)  Obstructive sleep apnea  Laams esophagus  H/O arthroscopy of knee  H/O total knee replacement, left          MEDICATIONS  (STANDING):  allopurinol 300 milliGRAM(s) Oral daily  famotidine    Tablet 20 milliGRAM(s) Oral two times a day  losartan 100 milliGRAM(s) Oral daily  pantoprazole    Tablet 40 milliGRAM(s) Oral before breakfast  tamsulosin 0.4 milliGRAM(s) Oral at bedtime    MEDICATIONS  (PRN):  acetaminophen     Tablet .. 650 milliGRAM(s) Oral every 6 hours PRN Temp greater or equal to 38C (100.4F), Mild Pain (1 - 3)  aluminum hydroxide/magnesium hydroxide/simethicone Suspension 30 milliLiter(s) Oral every 4 hours PRN Dyspepsia  melatonin 3 milliGRAM(s) Oral at bedtime PRN Insomnia  ondansetron Injectable 4 milliGRAM(s) IV Push every 8 hours PRN Nausea and/or Vomiting  oxyCODONE    IR 30 milliGRAM(s) Oral every 6 hours PRN Moderate to Severe Pain      Allergies    penicillin (Rash)  shellfish (Anaphylaxis)  Intolerances        SOCIAL HISTORY:  no tob,   no alcohol   no drugs    FAMILY HISTORY:  FH: multiple sclerosis (Sibling)          ROS: 14 point ROS negative other than what is present in HPI or below    Vital Signs Last 24 Hrs  T(C): 36.6 (28 Nov 2023 08:43), Max: 36.8 (27 Nov 2023 15:31)  T(F): 97.8 (28 Nov 2023 08:43), Max: 98.3 (27 Nov 2023 15:31)  HR: 75 (28 Nov 2023 08:43) (75 - 101)  BP: 104/63 (28 Nov 2023 08:43) (104/63 - 135/76)  BP(mean): --  RR: 17 (28 Nov 2023 08:43) (17 - 18)  SpO2: 96% (28 Nov 2023 08:43) (96% - 99%)    Parameters below as of 28 Nov 2023 08:43  Patient On (Oxygen Delivery Method): room air        General: NAD    Detailed Neurologic Exam:    Mental status: The patient is awake and alert and has normal attention span.  The patient is fully oriented in 3 spheres. The patient is oriented to current events. The patient is able to name objects, follow commands, repeat sentences.    Cranial nerves: Pupils equal and react symmetrically to light. There is altitudinal vision (superior field) loss in the Right eye . Extraocular motion is full with no nystagmus. There is no ptosis. Facial sensation is intact. Facial musculature is symmetric. Palate elevates symmetrically. Tongue is midline.    Motor: There is normal bulk and tone.  There is no tremor.  Strength is 5/5 in the right arm and leg.   Strength is 5/5 in the left arm and leg.    Sensation: Intact to light touch in 4 extremities    Reflexes: deferred    Cerebellar: There is no dysmetria on finger to nose testing.    Gait : deferred        NIH SS:  DATE: 11/28/2023  TIME: 1005  1A: Level of consciousness (0-3): 0  1B: Questions (0-2): 0  1C: Commands (0-2): 0  2: Gaze (0-2): 0  3: Visual fields (0-3): 1  4: Facial palsy (0-3): 0  MOTOR:  5A: Left arm motor drift (0-4): 0   5B: Right arm motor drift (0-4): 0   6A: Left leg motor drift (0-4): 0   6B: Right leg motor drift (0-4): 0  7: Limb ataxia (0-2): 0  SENSORY:  8: Sensation (0-2): 0  SPEECH:  9: Language (0-3): 0  10: Dysarthria (0-2): 0  EXTINCTION:  11: Extinction/inattention (0-2): 0    TOTAL SCORE: 1    prehospital mRS= 0      LABS:                         7.3    7.82  )-----------( 269      ( 28 Nov 2023 06:53 )             22.7       11-28    139  |  102  |  27.2<H>  ----------------------------<  113<H>  4.6   |  29.0  |  0.98    Ca    8.9      28 Nov 2023 06:53    TPro  6.2<L>  /  Alb  3.4  /  TBili  0.4  /  DBili  x   /  AST  20  /  ALT  27  /  AlkPhos  75  11-28      PT/INR - ( 27 Nov 2023 17:20 )   PT: 10.8 sec;   INR: 0.97 ratio         PTT - ( 28 Nov 2023 06:53 )  PTT:29.4 sec    Lipid panel:    HgbA1c: 6.4      RADIOLOGY & ADDITIONAL STUDIES (independently reviewed unless otherwise noted):        MR Head w/wo IV Cont (11.27.23 @ 18:58)   IMPRESSION: Evaluation of the orbits and brain appears unremarkable.      CT Brain Stroke Protocol (11.26.23 @ 10:58)   IMPRESSION:    CT Head: No acute intracranial hemorrhage, mass effect or demarcated   territorial infarction.      The findings were discussed with Dr. Askew by Dr. Richard Mahoney on   11/26/2023 11:08 AM    CT Perfusion: Normal CT perfusion    Intracranial CTA: No large vessel occlusion. Examination is degraded by   suboptimal contrast bolus    Extracranial CTA: No high-grade steno-occlusive disease. Examination is   degraded by suboptimal contrast bolus.       Xray Chest 1 View- PORTABLE-Urgent (11.26.23 @ 11:56)   IMPRESSION:   No radiographic evidence of active chest disease.       [facility] Stroke Team  Consult Note    CC: Right vision loss  HPI:  58 year old male w/ PMHx of morbid obestiy, AISHA (not on CPAP), Demetris's esophagus, HTN, gout, L knee s/p TKR 11/22/23 presenting to [facility] ED on 11/27 for Right eye blurry vision for approximately 7 days. Pt stated he noticed decreased Right eye in the lower quadrant after he was discharged from the hospital s/p Left knee total knee re-construction. Patient was seen prior in the ED for had a negative work up; CTH, CTA H/N and CTP. Patient was discharged home and referred to Ophthalmology. He saw the ophthalmologist who recommended he could back to the ED for bilateral papilledema. Patient denied dysphagia, dysarthria, gait abnormality photophobia, headache or neck stiffness. Patient had an unremarkable workup and the stroke team was consulted for further management.       PAST MEDICAL & SURGICAL HISTORY:  Gout  HTN (hypertension)  Obstructive sleep apnea  Lamas esophagus  H/O arthroscopy of knee  H/O total knee replacement, left        MEDICATIONS  (STANDING):  allopurinol 300 milliGRAM(s) Oral daily  famotidine    Tablet 20 milliGRAM(s) Oral two times a day  losartan 100 milliGRAM(s) Oral daily  pantoprazole    Tablet 40 milliGRAM(s) Oral before breakfast  tamsulosin 0.4 milliGRAM(s) Oral at bedtime    MEDICATIONS  (PRN):  acetaminophen     Tablet .. 650 milliGRAM(s) Oral every 6 hours PRN Temp greater or equal to 38C (100.4F), Mild Pain (1 - 3)  aluminum hydroxide/magnesium hydroxide/simethicone Suspension 30 milliLiter(s) Oral every 4 hours PRN Dyspepsia  melatonin 3 milliGRAM(s) Oral at bedtime PRN Insomnia  ondansetron Injectable 4 milliGRAM(s) IV Push every 8 hours PRN Nausea and/or Vomiting  oxyCODONE    IR 30 milliGRAM(s) Oral every 6 hours PRN Moderate to Severe Pain      Allergies    penicillin (Rash)  shellfish (Anaphylaxis)  Intolerances        SOCIAL HISTORY:  no tob,   no alcohol   no drugs    FAMILY HISTORY:  FH: multiple sclerosis (Sibling)      ROS: 14 point ROS negative other than what is present in HPI or below    Vital Signs Last 24 Hrs  T(C): 36.6 (28 Nov 2023 08:43), Max: 36.8 (27 Nov 2023 15:31)  T(F): 97.8 (28 Nov 2023 08:43), Max: 98.3 (27 Nov 2023 15:31)  HR: 75 (28 Nov 2023 08:43) (75 - 101)  BP: 104/63 (28 Nov 2023 08:43) (104/63 - 135/76)  BP(mean): --  RR: 17 (28 Nov 2023 08:43) (17 - 18)  SpO2: 96% (28 Nov 2023 08:43) (96% - 99%)    Parameters below as of 28 Nov 2023 08:43  Patient On (Oxygen Delivery Method): room air        General: NAD    Detailed Neurologic Exam:    Mental status: The patient is awake and alert and has normal attention span.  The patient is fully oriented in 3 spheres. The patient is oriented to current events. The patient is able to name objects, follow commands, repeat sentences.    Cranial nerves: Pupils equal and react symmetrically to light. There is altitudinal vision (superior field) loss in the Right eye . Extraocular motion is full with no nystagmus. There is no ptosis. Facial sensation is intact. Facial musculature is symmetric. Palate elevates symmetrically. Tongue is midline.    Motor: There is normal bulk and tone.  There is no tremor.  Strength is 5/5 in the right arm and leg.   Strength is 5/5 in the left arm and leg.    Sensation: Intact to light touch in 4 extremities    Reflexes: deferred    Cerebellar: There is no dysmetria on finger to nose testing.    Gait : deferred    [facility] SS:  DATE: 11/28/2023  TIME: 1005  1A: Level of consciousness (0-3): 0  1B: Questions (0-2): 0  1C: Commands (0-2): 0  2: Gaze (0-2): 0  3: Visual fields (0-3): 1  4: Facial palsy (0-3): 0  MOTOR:  5A: Left arm motor drift (0-4): 0   5B: Right arm motor drift (0-4): 0   6A: Left leg motor drift (0-4): 0   6B: Right leg motor drift (0-4): 0  7: Limb ataxia (0-2): 0  SENSORY:  8: Sensation (0-2): 0  SPEECH:  9: Language (0-3): 0  10: Dysarthria (0-2): 0  EXTINCTION:  11: Extinction/inattention (0-2): 0    TOTAL SCORE: 1    prehospital mRS= 0      LABS:                         7.3    7.82  )-----------( 269      ( 28 Nov 2023 06:53 )             22.7       11-28    139  |  102  |  27.2<H>  ----------------------------<  113<H>  4.6   |  29.0  |  0.98    Ca    8.9      28 Nov 2023 06:53    TPro  6.2<L>  /  Alb  3.4  /  TBili  0.4  /  DBili  x   /  AST  20  /  ALT  27  /  AlkPhos  75  11-28      PT/INR - ( 27 Nov 2023 17:20 )   PT: 10.8 sec;   INR: 0.97 ratio         PTT - ( 28 Nov 2023 06:53 )  PTT:29.4 sec    Lipid panel:     HgbA1c: 6.4      RADIOLOGY & ADDITIONAL STUDIES (independently reviewed unless otherwise noted):  From LP done by IR  Operative Findings:  · Operative Findings	Opening pressure 37 cmH20. CSF blood-tinged. 28cc CSF removed. closing pressure [id number].        MR Head and Orbits w/wo IV Cont (11.27.23 @ 18:58)   IMPRESSION: Evaluation of the orbits and brain appears unremarkable.      CT Brain Stroke Protocol (11.26.23 @ 10:58)   IMPRESSION:    CT Head: No acute intracranial hemorrhage, mass effect or demarcated   territorial infarction.      The findings were discussed with Dr. Askew by Dr. Richard Mahoney on   11/26/2023 11:08 AM    CT Perfusion: Normal CT perfusion    Intracranial CTA: No large vessel occlusion. Examination is degraded by   suboptimal contrast bolus    Extracranial CTA: No high-grade steno-occlusive disease. Examination is   degraded by suboptimal contrast bolus.       Xray Chest 1 View- PORTABLE-Urgent (11.26.23 @ 11:56)   IMPRESSION:   No radiographic evidence of active chest disease.

## 2023-11-29 LAB
ANION GAP SERPL CALC-SCNC: 10 MMOL/L — SIGNIFICANT CHANGE UP (ref 5–17)
ANION GAP SERPL CALC-SCNC: 10 MMOL/L — SIGNIFICANT CHANGE UP (ref 5–17)
BASOPHILS # BLD AUTO: 0.05 K/UL — SIGNIFICANT CHANGE UP (ref 0–0.2)
BASOPHILS # BLD AUTO: 0.05 K/UL — SIGNIFICANT CHANGE UP (ref 0–0.2)
BASOPHILS NFR BLD AUTO: 0.6 % — SIGNIFICANT CHANGE UP (ref 0–2)
BASOPHILS NFR BLD AUTO: 0.6 % — SIGNIFICANT CHANGE UP (ref 0–2)
BUN SERPL-MCNC: 26.3 MG/DL — HIGH (ref 8–20)
BUN SERPL-MCNC: 26.3 MG/DL — HIGH (ref 8–20)
CALCIUM SERPL-MCNC: 9.1 MG/DL — SIGNIFICANT CHANGE UP (ref 8.4–10.5)
CALCIUM SERPL-MCNC: 9.1 MG/DL — SIGNIFICANT CHANGE UP (ref 8.4–10.5)
CHLORIDE SERPL-SCNC: 98 MMOL/L — SIGNIFICANT CHANGE UP (ref 96–108)
CHLORIDE SERPL-SCNC: 98 MMOL/L — SIGNIFICANT CHANGE UP (ref 96–108)
CO2 SERPL-SCNC: 25 MMOL/L — SIGNIFICANT CHANGE UP (ref 22–29)
CO2 SERPL-SCNC: 25 MMOL/L — SIGNIFICANT CHANGE UP (ref 22–29)
CREAT SERPL-MCNC: 0.89 MG/DL — SIGNIFICANT CHANGE UP (ref 0.5–1.3)
CREAT SERPL-MCNC: 0.89 MG/DL — SIGNIFICANT CHANGE UP (ref 0.5–1.3)
EGFR: 99 ML/MIN/1.73M2 — SIGNIFICANT CHANGE UP
EGFR: 99 ML/MIN/1.73M2 — SIGNIFICANT CHANGE UP
EOSINOPHIL # BLD AUTO: 0.34 K/UL — SIGNIFICANT CHANGE UP (ref 0–0.5)
EOSINOPHIL # BLD AUTO: 0.34 K/UL — SIGNIFICANT CHANGE UP (ref 0–0.5)
EOSINOPHIL NFR BLD AUTO: 4.3 % — SIGNIFICANT CHANGE UP (ref 0–6)
EOSINOPHIL NFR BLD AUTO: 4.3 % — SIGNIFICANT CHANGE UP (ref 0–6)
FERRITIN SERPL-MCNC: 239 NG/ML — SIGNIFICANT CHANGE UP (ref 30–400)
FERRITIN SERPL-MCNC: 239 NG/ML — SIGNIFICANT CHANGE UP (ref 30–400)
GLUCOSE SERPL-MCNC: 121 MG/DL — HIGH (ref 70–99)
GLUCOSE SERPL-MCNC: 121 MG/DL — HIGH (ref 70–99)
HCT VFR BLD CALC: 24.8 % — LOW (ref 39–50)
HCT VFR BLD CALC: 24.8 % — LOW (ref 39–50)
HCV AB S/CO SERPL IA: 0.06 S/CO — SIGNIFICANT CHANGE UP (ref 0–0.99)
HCV AB S/CO SERPL IA: 0.06 S/CO — SIGNIFICANT CHANGE UP (ref 0–0.99)
HCV AB SERPL-IMP: SIGNIFICANT CHANGE UP
HCV AB SERPL-IMP: SIGNIFICANT CHANGE UP
HGB BLD-MCNC: 7.8 G/DL — LOW (ref 13–17)
HGB BLD-MCNC: 7.8 G/DL — LOW (ref 13–17)
IMM GRANULOCYTES NFR BLD AUTO: 1 % — HIGH (ref 0–0.9)
IMM GRANULOCYTES NFR BLD AUTO: 1 % — HIGH (ref 0–0.9)
IRON SATN MFR SERPL: 13 % — LOW (ref 16–55)
IRON SATN MFR SERPL: 13 % — LOW (ref 16–55)
IRON SATN MFR SERPL: 41 UG/DL — LOW (ref 59–158)
IRON SATN MFR SERPL: 41 UG/DL — LOW (ref 59–158)
LYMPHOCYTES # BLD AUTO: 1.98 K/UL — SIGNIFICANT CHANGE UP (ref 1–3.3)
LYMPHOCYTES # BLD AUTO: 1.98 K/UL — SIGNIFICANT CHANGE UP (ref 1–3.3)
LYMPHOCYTES # BLD AUTO: 24.8 % — SIGNIFICANT CHANGE UP (ref 13–44)
LYMPHOCYTES # BLD AUTO: 24.8 % — SIGNIFICANT CHANGE UP (ref 13–44)
MAGNESIUM SERPL-MCNC: 1.9 MG/DL — SIGNIFICANT CHANGE UP (ref 1.6–2.6)
MAGNESIUM SERPL-MCNC: 1.9 MG/DL — SIGNIFICANT CHANGE UP (ref 1.6–2.6)
MCHC RBC-ENTMCNC: 30.2 PG — SIGNIFICANT CHANGE UP (ref 27–34)
MCHC RBC-ENTMCNC: 30.2 PG — SIGNIFICANT CHANGE UP (ref 27–34)
MCHC RBC-ENTMCNC: 31.5 GM/DL — LOW (ref 32–36)
MCHC RBC-ENTMCNC: 31.5 GM/DL — LOW (ref 32–36)
MCV RBC AUTO: 96.1 FL — SIGNIFICANT CHANGE UP (ref 80–100)
MCV RBC AUTO: 96.1 FL — SIGNIFICANT CHANGE UP (ref 80–100)
MONOCYTES # BLD AUTO: 0.91 K/UL — HIGH (ref 0–0.9)
MONOCYTES # BLD AUTO: 0.91 K/UL — HIGH (ref 0–0.9)
MONOCYTES NFR BLD AUTO: 11.4 % — SIGNIFICANT CHANGE UP (ref 2–14)
MONOCYTES NFR BLD AUTO: 11.4 % — SIGNIFICANT CHANGE UP (ref 2–14)
NEUTROPHILS # BLD AUTO: 4.64 K/UL — SIGNIFICANT CHANGE UP (ref 1.8–7.4)
NEUTROPHILS # BLD AUTO: 4.64 K/UL — SIGNIFICANT CHANGE UP (ref 1.8–7.4)
NEUTROPHILS NFR BLD AUTO: 57.9 % — SIGNIFICANT CHANGE UP (ref 43–77)
NEUTROPHILS NFR BLD AUTO: 57.9 % — SIGNIFICANT CHANGE UP (ref 43–77)
PHOSPHATE SERPL-MCNC: 4.3 MG/DL — SIGNIFICANT CHANGE UP (ref 2.4–4.7)
PHOSPHATE SERPL-MCNC: 4.3 MG/DL — SIGNIFICANT CHANGE UP (ref 2.4–4.7)
PLATELET # BLD AUTO: 288 K/UL — SIGNIFICANT CHANGE UP (ref 150–400)
PLATELET # BLD AUTO: 288 K/UL — SIGNIFICANT CHANGE UP (ref 150–400)
POTASSIUM SERPL-MCNC: 4 MMOL/L — SIGNIFICANT CHANGE UP (ref 3.5–5.3)
POTASSIUM SERPL-MCNC: 4 MMOL/L — SIGNIFICANT CHANGE UP (ref 3.5–5.3)
POTASSIUM SERPL-SCNC: 4 MMOL/L — SIGNIFICANT CHANGE UP (ref 3.5–5.3)
POTASSIUM SERPL-SCNC: 4 MMOL/L — SIGNIFICANT CHANGE UP (ref 3.5–5.3)
RBC # BLD: 2.58 M/UL — LOW (ref 4.2–5.8)
RBC # BLD: 2.58 M/UL — LOW (ref 4.2–5.8)
RBC # FLD: 13.6 % — SIGNIFICANT CHANGE UP (ref 10.3–14.5)
RBC # FLD: 13.6 % — SIGNIFICANT CHANGE UP (ref 10.3–14.5)
SODIUM SERPL-SCNC: 133 MMOL/L — LOW (ref 135–145)
SODIUM SERPL-SCNC: 133 MMOL/L — LOW (ref 135–145)
TIBC SERPL-MCNC: 319 UG/DL — SIGNIFICANT CHANGE UP (ref 220–430)
TIBC SERPL-MCNC: 319 UG/DL — SIGNIFICANT CHANGE UP (ref 220–430)
TRANSFERRIN SERPL-MCNC: 223 MG/DL — SIGNIFICANT CHANGE UP (ref 180–329)
TRANSFERRIN SERPL-MCNC: 223 MG/DL — SIGNIFICANT CHANGE UP (ref 180–329)
WBC # BLD: 8 K/UL — SIGNIFICANT CHANGE UP (ref 3.8–10.5)
WBC # BLD: 8 K/UL — SIGNIFICANT CHANGE UP (ref 3.8–10.5)
WBC # FLD AUTO: 8 K/UL — SIGNIFICANT CHANGE UP (ref 3.8–10.5)
WBC # FLD AUTO: 8 K/UL — SIGNIFICANT CHANGE UP (ref 3.8–10.5)

## 2023-11-29 PROCEDURE — 93882 EXTRACRANIAL UNI/LTD STUDY: CPT

## 2023-11-29 PROCEDURE — 99232 SBSQ HOSP IP/OBS MODERATE 35: CPT

## 2023-11-29 PROCEDURE — 99233 SBSQ HOSP IP/OBS HIGH 50: CPT

## 2023-11-29 PROCEDURE — 93998 UNLISTD NONINVAS VASC DX STD: CPT | Mod: 26

## 2023-11-29 RX ORDER — ASPIRIN/CALCIUM CARB/MAGNESIUM 324 MG
81 TABLET ORAL DAILY
Refills: 0 | Status: DISCONTINUED | OUTPATIENT
Start: 2023-11-29 | End: 2023-11-30

## 2023-11-29 RX ORDER — FERROUS SULFATE 325(65) MG
325 TABLET ORAL DAILY
Refills: 0 | Status: DISCONTINUED | OUTPATIENT
Start: 2023-11-29 | End: 2023-11-30

## 2023-11-29 RX ADMIN — Medication 81 MILLIGRAM(S): at 17:26

## 2023-11-29 RX ADMIN — ACETAZOLAMIDE 500 MILLIGRAM(S): 250 TABLET ORAL at 17:26

## 2023-11-29 RX ADMIN — OXYCODONE HYDROCHLORIDE 30 MILLIGRAM(S): 5 TABLET ORAL at 08:38

## 2023-11-29 RX ADMIN — OXYCODONE HYDROCHLORIDE 30 MILLIGRAM(S): 5 TABLET ORAL at 01:53

## 2023-11-29 RX ADMIN — Medication 300 MILLIGRAM(S): at 11:14

## 2023-11-29 RX ADMIN — ACETAZOLAMIDE 500 MILLIGRAM(S): 250 TABLET ORAL at 05:01

## 2023-11-29 RX ADMIN — FAMOTIDINE 20 MILLIGRAM(S): 10 INJECTION INTRAVENOUS at 17:26

## 2023-11-29 RX ADMIN — OXYCODONE HYDROCHLORIDE 30 MILLIGRAM(S): 5 TABLET ORAL at 08:08

## 2023-11-29 RX ADMIN — OXYCODONE HYDROCHLORIDE 30 MILLIGRAM(S): 5 TABLET ORAL at 14:19

## 2023-11-29 RX ADMIN — OXYCODONE HYDROCHLORIDE 30 MILLIGRAM(S): 5 TABLET ORAL at 00:53

## 2023-11-29 RX ADMIN — LOSARTAN POTASSIUM 100 MILLIGRAM(S): 100 TABLET, FILM COATED ORAL at 05:00

## 2023-11-29 RX ADMIN — FAMOTIDINE 20 MILLIGRAM(S): 10 INJECTION INTRAVENOUS at 05:01

## 2023-11-29 RX ADMIN — OXYCODONE HYDROCHLORIDE 30 MILLIGRAM(S): 5 TABLET ORAL at 13:49

## 2023-11-29 RX ADMIN — PANTOPRAZOLE SODIUM 40 MILLIGRAM(S): 20 TABLET, DELAYED RELEASE ORAL at 05:01

## 2023-11-29 RX ADMIN — TAMSULOSIN HYDROCHLORIDE 0.4 MILLIGRAM(S): 0.4 CAPSULE ORAL at 21:21

## 2023-11-29 RX ADMIN — OXYCODONE HYDROCHLORIDE 30 MILLIGRAM(S): 5 TABLET ORAL at 20:45

## 2023-11-29 RX ADMIN — Medication 325 MILLIGRAM(S): at 17:26

## 2023-11-29 RX ADMIN — OXYCODONE HYDROCHLORIDE 30 MILLIGRAM(S): 5 TABLET ORAL at 19:46

## 2023-11-29 NOTE — PROGRESS NOTE ADULT - ASSESSMENT
58 year old male with PMHx of Morbid obesity AISHA, Lamas's esophagus, HTN, Gout, L knee s/p TKR 11/22/23 presenting with R eye visual deficit in lower fields for several days, s/p outpatient ophthalmology eval showing b/l papilledema, sent to hospital for further management    Bilateral papilledema w/ R vision loss - Concern for idiopathic intracranial hypertension  CTH/CTA/CTP negative on 11/26   MRI brain/orbit negative  LP unsuccessful in ED, IR consulted for LP   Holding ASA and VTE prophylaxis for LP  -[id number]  -Neuro consulted   -IR to perform LP today    Acute Anemia  -Most likely due to recent L TKR  -HGB baseline (12.6/37.6 on 11/1/23 on [facility])  -Denies hematochezia, melena, hematemesis   -Trend H/H   -Check T&S   -Blood consent obtained  irons studies. if low would add oral iron    B/L knee pain, s/p L TKR w/ edema  - Restart Aspirin 81mg daily  - Oxycodone q6h PRN  - hold celebrex 200 mg BID  - Ortho consulted - no acute intervention    HTN  - Olmesartan interchange to Losartan 100mg daily    Lamas esophagus   - noted on [facility] GI progress note 11/4/21   - Pepcid 20mg daily  - Protonix 40mg daily    Urinary retention   - Flomax 0.4mg daily    Gout  -- Allopurinol 300 mg daily    AISHA  -Currently not on CPAP  -Sees Dr. Askew outpatient    DVT ppx  - SCD   58 year old male with PMHx of Morbid obesity AISHA, Lamas's esophagus, HTN, Gout, L knee s/p TKR 11/22/23 presenting with R eye visual deficit in lower fields for several days, s/p outpatient ophthalmology eval showing b/l papilledema, sent to hospital for further management    Bilateral papilledema w/ R vision loss - Concern for idiopathic intracranial hypertension  - CTH/CTA/CTP negative on 11/26   - MRI brain/orbit negative  - s/p IR guided LP  - Restart Aspirin   - Neuro consult appreciated  - CT Venogram ordered  - Temporal Artery duplex ordered    Acute Anemia  - Most likely due to recent L TKR  - HGB baseline (12.6/37.6 on 11/1/23 on [facility])  - Iron 41  - Start ferrous sulfate    B/L knee pain, s/p L TKR w/ edema  - Restart Aspirin 81mg daily  - Oxycodone q6h PRN  - hold celebrex 200 mg BID  - Ortho consulted - no acute intervention    HTN  - Olmesartan interchange to Losartan 100mg daily    Lamas esophagus   - noted on [facility] GI progress note 11/4/21   - Pepcid 20mg daily  - Protonix 40mg daily    Urinary retention   - Flomax 0.4mg daily    Gout  - Allopurinol 300 mg daily    AISHA  -Currently not on CPAP  -Sees Dr. Askew outpatient    DVT ppx  - SCD

## 2023-11-29 NOTE — PROGRESS NOTE ADULT - NS ATTEND AMEND GEN_ALL_CORE FT
Seen and examined with the ACP team. Plan discussed with ACPs.    I agree with assessment and plan  as written with modifications made above.    will follow with you    Alexis Hodgson MD PhD   [phone]

## 2023-11-29 NOTE — PROGRESS NOTE ADULT - SUBJECTIVE AND OBJECTIVE BOX
Chief complaint: Vision Loss    Patient seen and examined at bedside. No acute overnight events reported. Patient states he would like to go home. No fever, chills, cough, nausea or vomiting.     Vital Signs Last 24 Hrs  T(F): 98.7 (29 Nov 2023 11:10), Max: 99.5 (29 Nov 2023 08:45)  HR: 86 (29 Nov 2023 11:10) (78 - 99)  BP: 120/70 (29 Nov 2023 11:10) (113/66 - 143/77)  RR: 18 (29 Nov 2023 11:10) (18 - 18)  SpO2: 97% (29 Nov 2023 11:10) (94% - 99%)    Physical Exam:  Constitutional: alert and oriented, in no acute distress   Neck: Soft and supple  Respiratory: Clear to auscultation bilaterally  Cardiovascular: Regular rate and rhythm  Gastrointestinal: Soft, non-tender to palpation, +bs  Musculoskeletal: no lower extremity edema bilaterally    Labs:                        7.8    8.00  )-----------( 288      ( 29 Nov 2023 07:13 )             24.8   11-29    133<L>  |  98  |  26.3<H>  ----------------------------<  121<H>  4.0   |  25.0  |  0.89    Ca    9.1      29 Nov 2023 07:13  Phos  4.3     11-29  Mg     1.9     11-29    TPro  6.2<L>  /  Alb  3.4  /  TBili  0.4  /  DBili  x   /  AST  20  /  ALT  27  /  AlkPhos  75  11-28

## 2023-11-29 NOTE — PROGRESS NOTE ADULT - SUBJECTIVE AND OBJECTIVE BOX
Preliminary note, offical recommendations pending attending review/signature   [facility] Stroke Team  Progress Note     HPI:  58 year old male w/ PMHx of morbid obestiy, AISHA (not on CPAP), Demetris's esophagus, HTN, gout, L knee s/p TKR 11/22/23 presenting to [facility] ED on 11/27 for Right eye blurry vision for approximately 7 days. Pt stated he noticed decreased Right eye in the lower quadrant after he was discharged from the hospital s/p Left knee total knee re-construction. Patient was seen prior in the ED for had a negative work up; CTH, CTA H/N and CTP. Patient was discharged home and referred to Ophthalmology. He saw the ophthalmologist who recommended he could back to the ED for bilateral papilledema. Patient denied dysphagia, dysarthria, gait abnormality photophobia, headache or neck stiffness. Patient had an unremarkable workup and the stroke team was consulted for further management.     SUBJECTIVE: States vision feels better, no events overnight.  No new neurologic complaints.  ROS reported negative unless otherwise noted.      acetaminophen     Tablet .. 650 milliGRAM(s) Oral every 6 hours PRN  acetaZOLAMIDE    Tablet 500 milliGRAM(s) Oral two times a day  allopurinol 300 milliGRAM(s) Oral daily  aluminum hydroxide/magnesium hydroxide/simethicone Suspension 30 milliLiter(s) Oral every 4 hours PRN  aspirin enteric coated 81 milliGRAM(s) Oral daily  famotidine    Tablet 20 milliGRAM(s) Oral two times a day  ferrous    sulfate 325 milliGRAM(s) Oral daily  losartan 100 milliGRAM(s) Oral daily  melatonin 3 milliGRAM(s) Oral at bedtime PRN  ondansetron Injectable 4 milliGRAM(s) IV Push every 8 hours PRN  oxyCODONE    IR 30 milliGRAM(s) Oral every 6 hours PRN  pantoprazole    Tablet 40 milliGRAM(s) Oral before breakfast  tamsulosin 0.4 milliGRAM(s) Oral at bedtime      PHYSICAL EXAM:   Vital Signs Last 24 Hrs  T(C): 37.1 (29 Nov 2023 11:10), Max: 37.5 (29 Nov 2023 08:45)  T(F): 98.7 (29 Nov 2023 11:10), Max: 99.5 (29 Nov 2023 08:45)  HR: 86 (29 Nov 2023 11:10) (78 - 99)  BP: 120/70 (29 Nov 2023 11:10) (113/66 - 143/77)  BP(mean): --  RR: 18 (29 Nov 2023 11:10) (18 - 18)  SpO2: 97% (29 Nov 2023 11:10) (94% - 99%)    Parameters below as of 29 Nov 2023 11:10  Patient On (Oxygen Delivery Method): room air        General: NAD, in bed watching TV with wife at bedside     Detailed Neurologic Exam:    Mental status: The patient is awake and alert and has normal attention span.  The patient is fully oriented in 3 spheres. The patient is oriented to current events. The patient is able to name objects, follow commands, repeat sentences.    Cranial nerves: Pupils equal and react symmetrically to light. There is altitudinal vision (superior field) loss in the Right eye . Extraocular motion is full with no nystagmus. There is no ptosis. Facial sensation is intact. Facial musculature is symmetric. Palate elevates symmetrically. Tongue is midline.    Motor: There is normal bulk and tone.  There is no tremor.  Strength is 5/5 in the right arm and leg.   Strength is 5/5 in the left arm and 4-/5 leg. ( with LE painful 2/2 to recent knee replacement)     Sensation: Intact to light touch in 4 extremities    Reflexes: deferred    Cerebellar: There is no dysmetria on finger to nose testing.    Gait : deferred        LABS:                        7.8    8.00  )-----------( 288      ( 29 Nov 2023 07:13 )             24.8    11-29    133<L>  |  98  |  26.3<H>  ----------------------------<  121<H>  4.0   |  25.0  |  0.89    Ca    9.1      29 Nov 2023 07:13  Phos  4.3     11-29  Mg     1.9     11-29    TPro  6.2<L>  /  Alb  3.4  /  TBili  0.4  /  DBili  x   /  AST  20  /  ALT  27  /  AlkPhos  75  11-28  PT/INR - ( 27 Nov 2023 17:20 )   PT: 10.8 sec;   INR: 0.97 ratio         PTT - ( 28 Nov 2023 06:53 )  PTT:29.4 sec      IMAGING: Reviewed by me.        Xray Knee 3 Views, Left (11.28.23 @ 06:07)   IMPRESSION: Knee replacement is unchanged. There is swelling of the   distal anterior left thigh.      From LP done by IR  Operative Findings:   Opening pressure 37 cmH20. CSF blood-tinged.   28cc CSF removed. Closing pressure [id number].        MR Head and Orbits w/wo IV Cont (11.27.23 @ 18:58)   IMPRESSION: Evaluation of the orbits and brain appears unremarkable.      CT Brain Stroke Protocol (11.26.23 @ 10:58)   IMPRESSION:    CT Head: No acute intracranial hemorrhage, mass effect or demarcated   territorial infarction.      The findings were discussed with Dr. Askew by Dr. Richard Mahoney on   11/26/2023 11:08 AM    CT Perfusion: Normal CT perfusion    Intracranial CTA: No large vessel occlusion. Examination is degraded by   suboptimal contrast bolus    Extracranial CTA: No high-grade steno-occlusive disease. Examination is   degraded by suboptimal contrast bolus.       Xray Chest 1 View- PORTABLE-Urgent (11.26.23 @ 11:56)   IMPRESSION:   No radiographic evidence of active chest disease.   [facility] Stroke Team  Progress Note     HPI:  58 year old male w/ PMHx of morbid obesity AISHA (not on CPAP), Lamas's esophagus, HTN, gout, L knee s/p TKR 11/22/23 presenting to [facility] ED on 11/27 for Right eye blurry vision for approximately 7 days. Pt stated he noticed decreased Right eye in the lower quadrant after he was discharged from the hospital s/p Left knee total knee re-construction. Patient was seen prior in the ED for had a negative work up; CTH, CTA H/N and CTP. Patient was discharged home and referred to Ophthalmology. He saw the ophthalmologist who recommended he could back to the ED for bilateral papilledema. Patient denied dysphagia, dysarthria, gait abnormality photophobia, headache or neck stiffness. Patient had an unremarkable workup and the stroke team was consulted for further management.     SUBJECTIVE: States vision feels better, no events overnight.  No new neurologic complaints.  ROS reported negative unless otherwise noted.      acetaminophen     Tablet .. 650 milliGRAM(s) Oral every 6 hours PRN  acetaZOLAMIDE    Tablet 500 milliGRAM(s) Oral two times a day  allopurinol 300 milliGRAM(s) Oral daily  aluminum hydroxide/magnesium hydroxide/simethicone Suspension 30 milliLiter(s) Oral every 4 hours PRN  aspirin enteric coated 81 milliGRAM(s) Oral daily  famotidine    Tablet 20 milliGRAM(s) Oral two times a day  ferrous    sulfate 325 milliGRAM(s) Oral daily  losartan 100 milliGRAM(s) Oral daily  melatonin 3 milliGRAM(s) Oral at bedtime PRN  ondansetron Injectable 4 milliGRAM(s) IV Push every 8 hours PRN  oxyCODONE    IR 30 milliGRAM(s) Oral every 6 hours PRN  pantoprazole    Tablet 40 milliGRAM(s) Oral before breakfast  tamsulosin 0.4 milliGRAM(s) Oral at bedtime      PHYSICAL EXAM:   Vital Signs Last 24 Hrs  T(C): 37.1 (29 Nov 2023 11:10), Max: 37.5 (29 Nov 2023 08:45)  T(F): 98.7 (29 Nov 2023 11:10), Max: 99.5 (29 Nov 2023 08:45)  HR: 86 (29 Nov 2023 11:10) (78 - 99)  BP: 120/70 (29 Nov 2023 11:10) (113/66 - 143/77)  BP(mean): --  RR: 18 (29 Nov 2023 11:10) (18 - 18)  SpO2: 97% (29 Nov 2023 11:10) (94% - 99%)    Parameters below as of 29 Nov 2023 11:10  Patient On (Oxygen Delivery Method): room air    General: NAD, in bed watching TV with wife at bedside     Detailed Neurologic Exam:    Mental status: The patient is awake and alert and has normal attention span.  The patient is fully oriented in 3 spheres. The patient is oriented to current events. The patient is able to name objects, follow commands, repeat sentences.    Cranial nerves: Pupils equal and react symmetrically to light. There is altitudinal vision (superior field) loss in the Right eye . Extraocular motion is full with no nystagmus. There is no ptosis. Facial sensation is intact. Facial musculature is symmetric. Palate elevates symmetrically. Tongue is midline.    Motor: There is normal bulk and tone.  There is no tremor.  Strength is 5/5 in the right arm and leg.   Strength is 5/5 in the left arm and 4-/5 leg. ( with LE painful 2/2 to recent knee replacement)     Sensation: Intact to light touch in 4 extremities    Reflexes: deferred    Cerebellar: There is no dysmetria on finger to nose testing.    Gait : deferred        LABS:                        7.8    8.00  )-----------( 288      ( 29 Nov 2023 07:13 )             24.8    11-29    133<L>  |  98  |  26.3<H>  ----------------------------<  121<H>  4.0   |  25.0  |  0.89    Ca    9.1      29 Nov 2023 07:13  Phos  4.3     11-29  Mg     1.9     11-29    TPro  6.2<L>  /  Alb  3.4  /  TBili  0.4  /  DBili  x   /  AST  20  /  ALT  27  /  AlkPhos  75  11-28  PT/INR - ( 27 Nov 2023 17:20 )   PT: 10.8 sec;   INR: 0.97 ratio         PTT - ( 28 Nov 2023 06:53 )  PTT:29.4 sec    Sedimentation Rate, Erythrocyte (11.27.23 @ 17:20)    Sedimentation Rate, Erythrocyte: 95: [facility] performs the Erythrocyte Sedimentation  Rate assay utilizing the [employer] Mini analyzer method. mm/hr    C-Reactive Protein, Serum (11.27.23 @ 17:20)    C-Reactive Protein, Serum: 62 mg/L      IMAGING: Reviewed by me.        Xray Knee 3 Views, Left (11.28.23 @ 06:07)   IMPRESSION: Knee replacement is unchanged. There is swelling of the   distal anterior left thigh.      From LP done by IR  Operative Findings:   Opening pressure 37 cmH20. CSF blood-tinged.   28cc CSF removed. Closing pressure [id number].        MR Head and Orbits w/wo IV Cont (11.27.23 @ 18:58)   IMPRESSION: Evaluation of the orbits and brain appears unremarkable.      CT Brain Stroke Protocol (11.26.23 @ 10:58)   IMPRESSION:    CT Head: No acute intracranial hemorrhage, mass effect or demarcated   territorial infarction.      The findings were discussed with Dr. Askew by Dr. Richard Mahoney on   11/26/2023 11:08 AM    CT Perfusion: Normal CT perfusion    Intracranial CTA: No large vessel occlusion. Examination is degraded by   suboptimal contrast bolus    Extracranial CTA: No high-grade steno-occlusive disease. Examination is   degraded by suboptimal contrast bolus.       Xray Chest 1 View- PORTABLE-Urgent (11.26.23 @ 11:56)   IMPRESSION:   No radiographic evidence of active chest disease.

## 2023-11-29 NOTE — PROGRESS NOTE ADULT - ASSESSMENT
ASSESSMENT:   58 year old male w/ PMHx of morbid obestiy, AISHA (not on CPAP), Demetris's esophagus, HTN, gout, L knee s/p TKR 11/22/23 presenting to [facility] ED on 11/27 for Right eye blurry vision for approximately 7 days. Pt stated he noticed decreased Right eye in the upper quadrant after he was discharged from the hospital s/p Left knee total knee re-construction. Patient was seen prior in the ED for had a negative work up; CTH, CTA H/N and CTP. Patient was discharged home and referred to Ophthalmology. He saw the ophthalmologist who recommended he could back to the ED for bilateral papilledema. Patient denied dysphagia, dysarthria, gait abnormality photophobia, headache or neck stiffness. Patient had an unremarkable workup and the stroke team was consulted for further management.     NEURO:   -Neurologically with Right superior vision field cut improving s/p lumbar puncture   -Continue close monitoring for neurologic deterioration    - Stroke neuro checks q 4hr    - SBP goal 120 -140mmHg, patient appears to be tolerating this range without acute neurological changes  -ANTITHROMBOTIC THERAPY:   -titrate statin to LDL goal less than 70  -CTV head pending   -Temporal Arthritis ultrasound pending   -LP done, Opening pressure 37 cmH2O, closing pressure 28 cmH2O  -On Diamox 500 BID  -Monitor LFTs, QTc and Chemistry   -MRI Brain w/o noted  -MRI orbit noted  -Recommend serial Ophthalmological visit   -Dysphagia screen: pass/fail   -Physical therapy/OT/Speech eval/treatment.       CARDIOVASCULAR:   -Check TTE, cardiac monitoring w/ telemetry for now, further evaluation pending findings of noted workup                              HEMATOLOGY:   -H/H 7.8/24.8, Platelets 288, patient should have all age and risk appropriate malignancy screenings with PCP or sooner if clinically suspected   -On Ferrous sulfate  -LE duplex pending   -DVT ppx: Heparin s.c [] LMWH [] SCDs [x]     VASCULAR:   -Elevated ESR   -Temporal Arthritis ultrasound pending   -Would consider Steroid- held for now due to multiple confounding factors;   -Not limited to; elevated ESR in the setting of recent surgery,  -No headache si/sx relayed by patient or appreciated on exam  -Improvement of vision (subjectively) s/p LP   -PMH of gout and morbid obesity  -Trend levels     PULMONARY:   -CXR noted, on room air, protecting airway, saturating well     RENAL:   -BUN/Cr 27.2/0.98, monitor urine output, maintain adequate hydration    -Na Goal:  135-145         ID:   -Afebrile, no leukocytosis, monitor for si/sx of infection     OTHER:    -Condition and plan of care d/w patient, questions and concerns addressed.   -Recommend Weight loss education -  5-10% or more given elevated ICP possible pseudotumor cerebri    DISPOSITION:   -Rehab or home depending on PT eval once stable and workup is complete      CORE MEASURES:        Admission NIHSS:      Tenecteplase : [] YES [x] NO      LDL/HDL/A1C:     Depression Screen- if depression hx and/or present      Statin Therapy:     Dysphagia Screen: [] PASS [] FAIL     Smoking [] YES [] NO      Afib [] YES [] NO     Stroke Education [] YES [] NO    Obtain screening lower extremity venous ultrasound in patients who meet 1 or more of the following criteria as patient is high risk for DVT/PE on admission:   [] History of DVT/PE  []Hypercoagulable states (Factor V Leiden, Cancer, OCP, etc. )  [x]Prolonged immobility (hemiplegia/hemiparesis/post operative or any other extended immobilization)  [] Transferred from outside facility (Rehab or Long term care)  [] Age </= to 50 ASSESSMENT:   58 year old male w/ PMHx of morbid obestiy, AISHA (not on CPAP), Demetris's esophagus, HTN, gout, L knee s/p TKR 11/22/23 presenting to [facility] ED on 11/27 for Right eye blurry vision for approximately 7 days. Pt stated he noticed decreased Right eye in the upper quadrant after he was discharged from the hospital s/p Left knee total knee re-construction. Patient was seen prior in the ED for had a negative work up; CTH, CTA H/N and CTP. Patient was discharged home and referred to Ophthalmology. He saw the ophthalmologist who recommended he could back to the ED for bilateral papilledema. Patient denied dysphagia, dysarthria, gait abnormality photophobia, headache or neck stiffness. Patient had an unremarkable workup and the stroke team was consulted for further management.     NEURO:   -Neurologically with Right superior vision field cut improving s/p lumbar puncture   -Continue close monitoring for neurologic deterioration    - Stroke neuro checks q 4hr    - SBP goal 120 -140mmHg, patient appears to be tolerating this range without acute neurological changes  -ANTITHROMBOTIC THERAPY: on ASA 81 mg  -titrate statin to LDL goal less than 70  -CTV head pending   -Temporal Arthritis ultrasound pending   -LP done, Opening pressure 37 cmH2O, closing pressure 28 cmH2O  -On Diamox 500 BID  -Monitor LFTs, QTc and Chemistry   -MRI Brain w/o noted  -MRI orbit noted  -Recommend serial Ophthalmological visit   -Dysphagia screen: pass/fail   -Physical therapy/OT/Speech eval/treatment.       CARDIOVASCULAR:   -Check TTE, cardiac monitoring w/ telemetry for now, further evaluation pending findings of noted workup                              HEMATOLOGY:   -H/H 7.8/24.8, Platelets 288, patient should have all age and risk appropriate malignancy screenings with PCP or sooner if clinically suspected   -On Ferrous sulfate  -LE duplex pending   -DVT ppx: Heparin s.c [] LMWH [] SCDs [x]     VASCULAR:   -Elevated ESR   -Temporal Arthritis ultrasound pending   -Would consider Steroid- held for now due to multiple confounding factors;   -Not limited to; elevated ESR in the setting of recent surgery,  -No headache si/sx relayed by patient or appreciated on exam  -Improvement of vision (subjectively) s/p LP   -PMH of gout and morbid obesity  -Trend levels     PULMONARY:   -CXR noted, on room air, protecting airway, saturating well     RENAL:   -BUN/Cr 27.2/0.98, monitor urine output, maintain adequate hydration    -Na Goal:  135-145         ID:   -Afebrile, no leukocytosis, monitor for si/sx of infection     OTHER:    -Condition and plan of care d/w patient, questions and concerns addressed.   -Recommend Weight loss education -  5-10% or more given elevated ICP possible pseudotumor cerebri    DISPOSITION:   -Rehab or home depending on PT eval once stable and workup is complete      CORE MEASURES:        Admission NIHSS:      Tenecteplase : [] YES [x] NO      LDL/HDL/A1C:     Depression Screen- if depression hx and/or present      Statin Therapy:     Dysphagia Screen: [] PASS [] FAIL     Smoking [] YES [] NO      Afib [] YES [] NO     Stroke Education [] YES [] NO    Obtain screening lower extremity venous ultrasound in patients who meet 1 or more of the following criteria as patient is high risk for DVT/PE on admission:   [] History of DVT/PE  []Hypercoagulable states (Factor V Leiden, Cancer, OCP, etc. )  [x]Prolonged immobility (hemiplegia/hemiparesis/post operative or any other extended immobilization)  [] Transferred from outside facility (Rehab or Long term care)  [] Age </= to 50 ASSESSMENT:   58 year old male w/ PMHx of morbid obestiy, AISHA (not on CPAP), Demetris's esophagus, HTN, gout, L knee s/p TKR 11/22/23 presenting to [facility] ED on 11/27 for Right eye blurry vision for approximately 7 days. Pt stated he noticed decreased Right eye in the upper quadrant after he was discharged from the hospital s/p Left knee total knee re-construction. Patient was seen prior in the ED for had a negative work up; CTH, CTA H/N and CTP. Patient was discharged home and referred to Ophthalmology. He saw the ophthalmologist who recommended he could back to the ED for bilateral papilledema. Patient denied dysphagia, dysarthria, gait abnormality photophobia, headache or neck stiffness. Patient had an unremarkable workup and the stroke team was consulted for further management.     NEURO:   -Neurologically with Right superior vision field cut improving s/p lumbar puncture   -Continue close monitoring for neurologic deterioration    - Stroke neuro checks q 4hr    - SBP goal 120 -140mmHg, patient appears to be tolerating this range without acute neurological changes  -ANTITHROMBOTIC THERAPY: on ASA 81 mg  -titrate statin to LDL goal less than 70  -CTV head pending   -Temporal Artery ultrasound pending   -LP done, Opening pressure 37 cmH2O, closing pressure 28 cmH2O  -On Diamox 500 BID  -Monitor LFTs, QTc and Chemistry   -MRI Brain w/o noted  -MRI orbit noted  -Recommend serial Ophthalmological visit   -Dysphagia screen: pass/fail   -Physical therapy/OT/Speech eval/treatment.   ?association of iron deficiency anemia and papilledema (case reports noted)      CARDIOVASCULAR:   -Check TTE, cardiac monitoring w/ telemetry for now, further evaluation pending findings of noted workup                              HEMATOLOGY:   -H/H 7.8/24.8, Platelets 288, patient should have all age and risk appropriate malignancy screenings with PCP or sooner if clinically suspected   -On Ferrous sulfate, ? heme evaluation  -LE duplex pending   -DVT ppx: Heparin s.c [] LMWH [] SCDs [x]     VASCULAR:   -Elevated ESR   -Temporal Artery ultrasound pending   -Would consider Steroid- held for now due to multiple confounding factors for elevated inflammatory marker - one wekk post op, h/o arthritis;   -Not limited to; elevated ESR in the setting of recent surgery,  -No headache si/sx relayed by patient or appreciated on exam  -Improvement of vision (subjectively) s/p LP   -PMH of gout and morbid obesity  -Trend levels     PULMONARY:   -CXR noted, on room air, protecting airway, saturating well     RENAL:   -BUN/Cr 26.3/0.89, monitor urine output, maintain adequate hydration    -Na Goal:  135-145, a bit hyponatremic Na 133       ID:   -Afebrile, no leukocytosis, monitor for si/sx of infection     OTHER:    -Condition and plan of care d/w patient, questions and concerns addressed.   -Recommend Weight loss education -  5-10% or more given elevated ICP possible pseudotumor cerebri    DISPOSITION:   -Rehab or home depending on PT eval once stable and workup is complete      CORE MEASURES:        Admission NIHSS:      Tenecteplase : [] YES [x] NO      LDL/HDL/A1C:     Depression Screen- if depression hx and/or present      Statin Therapy:     Dysphagia Screen: [] PASS [] FAIL     Smoking [] YES [] NO      Afib [] YES [] NO     Stroke Education [] YES [] NO    Obtain screening lower extremity venous ultrasound in patients who meet 1 or more of the following criteria as patient is high risk for DVT/PE on admission:   [] History of DVT/PE  []Hypercoagulable states (Factor V Leiden, Cancer, OCP, etc. )  [x]Prolonged immobility (hemiplegia/hemiparesis/post operative or any other extended immobilization)  [] Transferred from outside facility (Rehab or Long term care)  [] Age </= to 50

## 2023-11-30 ENCOUNTER — TRANSCRIPTION ENCOUNTER (OUTPATIENT)
Age: 58
End: 2023-11-30

## 2023-11-30 VITALS
HEART RATE: 86 BPM | TEMPERATURE: 98 F | RESPIRATION RATE: 18 BRPM | DIASTOLIC BLOOD PRESSURE: 73 MMHG | OXYGEN SATURATION: 98 % | SYSTOLIC BLOOD PRESSURE: 128 MMHG

## 2023-11-30 LAB
ANION GAP SERPL CALC-SCNC: 14 MMOL/L — SIGNIFICANT CHANGE UP (ref 5–17)
ANION GAP SERPL CALC-SCNC: 14 MMOL/L — SIGNIFICANT CHANGE UP (ref 5–17)
BASOPHILS # BLD AUTO: 0.08 K/UL — SIGNIFICANT CHANGE UP (ref 0–0.2)
BASOPHILS # BLD AUTO: 0.08 K/UL — SIGNIFICANT CHANGE UP (ref 0–0.2)
BASOPHILS NFR BLD AUTO: 0.7 % — SIGNIFICANT CHANGE UP (ref 0–2)
BASOPHILS NFR BLD AUTO: 0.7 % — SIGNIFICANT CHANGE UP (ref 0–2)
BUN SERPL-MCNC: 25.7 MG/DL — HIGH (ref 8–20)
BUN SERPL-MCNC: 25.7 MG/DL — HIGH (ref 8–20)
CALCIUM SERPL-MCNC: 9.7 MG/DL — SIGNIFICANT CHANGE UP (ref 8.4–10.5)
CALCIUM SERPL-MCNC: 9.7 MG/DL — SIGNIFICANT CHANGE UP (ref 8.4–10.5)
CHLORIDE SERPL-SCNC: 103 MMOL/L — SIGNIFICANT CHANGE UP (ref 96–108)
CHLORIDE SERPL-SCNC: 103 MMOL/L — SIGNIFICANT CHANGE UP (ref 96–108)
CO2 SERPL-SCNC: 21 MMOL/L — LOW (ref 22–29)
CO2 SERPL-SCNC: 21 MMOL/L — LOW (ref 22–29)
CREAT SERPL-MCNC: 1.11 MG/DL — SIGNIFICANT CHANGE UP (ref 0.5–1.3)
CREAT SERPL-MCNC: 1.11 MG/DL — SIGNIFICANT CHANGE UP (ref 0.5–1.3)
EGFR: 77 ML/MIN/1.73M2 — SIGNIFICANT CHANGE UP
EGFR: 77 ML/MIN/1.73M2 — SIGNIFICANT CHANGE UP
EOSINOPHIL # BLD AUTO: 0.39 K/UL — SIGNIFICANT CHANGE UP (ref 0–0.5)
EOSINOPHIL # BLD AUTO: 0.39 K/UL — SIGNIFICANT CHANGE UP (ref 0–0.5)
EOSINOPHIL NFR BLD AUTO: 3.6 % — SIGNIFICANT CHANGE UP (ref 0–6)
EOSINOPHIL NFR BLD AUTO: 3.6 % — SIGNIFICANT CHANGE UP (ref 0–6)
GLUCOSE SERPL-MCNC: 129 MG/DL — HIGH (ref 70–99)
GLUCOSE SERPL-MCNC: 129 MG/DL — HIGH (ref 70–99)
HCT VFR BLD CALC: 28.7 % — LOW (ref 39–50)
HCT VFR BLD CALC: 28.7 % — LOW (ref 39–50)
HGB BLD-MCNC: 9 G/DL — LOW (ref 13–17)
HGB BLD-MCNC: 9 G/DL — LOW (ref 13–17)
IMM GRANULOCYTES NFR BLD AUTO: 1.2 % — HIGH (ref 0–0.9)
IMM GRANULOCYTES NFR BLD AUTO: 1.2 % — HIGH (ref 0–0.9)
LYMPHOCYTES # BLD AUTO: 2.51 K/UL — SIGNIFICANT CHANGE UP (ref 1–3.3)
LYMPHOCYTES # BLD AUTO: 2.51 K/UL — SIGNIFICANT CHANGE UP (ref 1–3.3)
LYMPHOCYTES # BLD AUTO: 23.2 % — SIGNIFICANT CHANGE UP (ref 13–44)
LYMPHOCYTES # BLD AUTO: 23.2 % — SIGNIFICANT CHANGE UP (ref 13–44)
MCHC RBC-ENTMCNC: 30 PG — SIGNIFICANT CHANGE UP (ref 27–34)
MCHC RBC-ENTMCNC: 30 PG — SIGNIFICANT CHANGE UP (ref 27–34)
MCHC RBC-ENTMCNC: 31.4 GM/DL — LOW (ref 32–36)
MCHC RBC-ENTMCNC: 31.4 GM/DL — LOW (ref 32–36)
MCV RBC AUTO: 95.7 FL — SIGNIFICANT CHANGE UP (ref 80–100)
MCV RBC AUTO: 95.7 FL — SIGNIFICANT CHANGE UP (ref 80–100)
MONOCYTES # BLD AUTO: 1.17 K/UL — HIGH (ref 0–0.9)
MONOCYTES # BLD AUTO: 1.17 K/UL — HIGH (ref 0–0.9)
MONOCYTES NFR BLD AUTO: 10.8 % — SIGNIFICANT CHANGE UP (ref 2–14)
MONOCYTES NFR BLD AUTO: 10.8 % — SIGNIFICANT CHANGE UP (ref 2–14)
NEUTROPHILS # BLD AUTO: 6.54 K/UL — SIGNIFICANT CHANGE UP (ref 1.8–7.4)
NEUTROPHILS # BLD AUTO: 6.54 K/UL — SIGNIFICANT CHANGE UP (ref 1.8–7.4)
NEUTROPHILS NFR BLD AUTO: 60.5 % — SIGNIFICANT CHANGE UP (ref 43–77)
NEUTROPHILS NFR BLD AUTO: 60.5 % — SIGNIFICANT CHANGE UP (ref 43–77)
PLATELET # BLD AUTO: 360 K/UL — SIGNIFICANT CHANGE UP (ref 150–400)
PLATELET # BLD AUTO: 360 K/UL — SIGNIFICANT CHANGE UP (ref 150–400)
POTASSIUM SERPL-MCNC: 4.1 MMOL/L — SIGNIFICANT CHANGE UP (ref 3.5–5.3)
POTASSIUM SERPL-MCNC: 4.1 MMOL/L — SIGNIFICANT CHANGE UP (ref 3.5–5.3)
POTASSIUM SERPL-SCNC: 4.1 MMOL/L — SIGNIFICANT CHANGE UP (ref 3.5–5.3)
POTASSIUM SERPL-SCNC: 4.1 MMOL/L — SIGNIFICANT CHANGE UP (ref 3.5–5.3)
RBC # BLD: 3 M/UL — LOW (ref 4.2–5.8)
RBC # BLD: 3 M/UL — LOW (ref 4.2–5.8)
RBC # FLD: 13.7 % — SIGNIFICANT CHANGE UP (ref 10.3–14.5)
RBC # FLD: 13.7 % — SIGNIFICANT CHANGE UP (ref 10.3–14.5)
SODIUM SERPL-SCNC: 138 MMOL/L — SIGNIFICANT CHANGE UP (ref 135–145)
SODIUM SERPL-SCNC: 138 MMOL/L — SIGNIFICANT CHANGE UP (ref 135–145)
WBC # BLD: 10.82 K/UL — HIGH (ref 3.8–10.5)
WBC # BLD: 10.82 K/UL — HIGH (ref 3.8–10.5)
WBC # FLD AUTO: 10.82 K/UL — HIGH (ref 3.8–10.5)
WBC # FLD AUTO: 10.82 K/UL — HIGH (ref 3.8–10.5)

## 2023-11-30 PROCEDURE — 99239 HOSP IP/OBS DSCHRG MGMT >30: CPT

## 2023-11-30 PROCEDURE — 70545 MR ANGIOGRAPHY HEAD W/DYE: CPT | Mod: 26

## 2023-11-30 PROCEDURE — 99232 SBSQ HOSP IP/OBS MODERATE 35: CPT

## 2023-11-30 RX ORDER — ACETAZOLAMIDE 250 MG/1
2 TABLET ORAL
Qty: 120 | Refills: 0
Start: 2023-11-30 | End: 2023-12-29

## 2023-11-30 RX ADMIN — OXYCODONE HYDROCHLORIDE 30 MILLIGRAM(S): 5 TABLET ORAL at 11:31

## 2023-11-30 RX ADMIN — ACETAZOLAMIDE 500 MILLIGRAM(S): 250 TABLET ORAL at 05:20

## 2023-11-30 RX ADMIN — FAMOTIDINE 20 MILLIGRAM(S): 10 INJECTION INTRAVENOUS at 05:20

## 2023-11-30 RX ADMIN — Medication 30 MILLILITER(S): at 03:03

## 2023-11-30 RX ADMIN — Medication 81 MILLIGRAM(S): at 11:01

## 2023-11-30 RX ADMIN — PANTOPRAZOLE SODIUM 40 MILLIGRAM(S): 20 TABLET, DELAYED RELEASE ORAL at 05:21

## 2023-11-30 RX ADMIN — OXYCODONE HYDROCHLORIDE 30 MILLIGRAM(S): 5 TABLET ORAL at 11:01

## 2023-11-30 RX ADMIN — Medication 325 MILLIGRAM(S): at 11:01

## 2023-11-30 RX ADMIN — Medication 300 MILLIGRAM(S): at 11:01

## 2023-11-30 RX ADMIN — LOSARTAN POTASSIUM 100 MILLIGRAM(S): 100 TABLET, FILM COATED ORAL at 05:21

## 2023-11-30 RX ADMIN — OXYCODONE HYDROCHLORIDE 30 MILLIGRAM(S): 5 TABLET ORAL at 02:55

## 2023-11-30 RX ADMIN — OXYCODONE HYDROCHLORIDE 30 MILLIGRAM(S): 5 TABLET ORAL at 03:54

## 2023-11-30 NOTE — DISCHARGE NOTE PROVIDER - NSDCCAREPROVSEEN_GEN_ALL_CORE_FT
[phone]    Onset: 5/12 @ 2030  Location / description: lower abdominal pain, middle  Precipitating Factors: recently tried a new ; no sweating, no chills, or chest pain  Pain Scale (1-10), 10 highest: 8/10, slightly better now  Associated Symptoms: feels bloated, \"gasses\", lots of stool- difficult to come out; no nausea  What improves / worsens symptoms: Has bowel movement every morning  Symptom specific medications: takes metoprolol 1.5 pill; omeprazole daily and tried tums  LMP : No LMP recorded (lmp unknown). Patient is postmenopausal.  Recent visits (last 3-4 weeks) for same reason or recent surgery: 5/5 for blood pressure adjustment    Will try an enema and will call back if pain gets worse    PLAN:   Home Care Advice provided    Patient/Caller agrees to follow recommendations.    Reason for Disposition  • [1] SEVERE pain AND [2] present < 1 hour    Protocols used: ABDOMINAL PAIN - FEMALE-A-      
Duplicate message  
Marlee Murcia

## 2023-11-30 NOTE — PROGRESS NOTE ADULT - ASSESSMENT
58 year old male with PMHx of Morbid obesity AISHA, Lamas's esophagus, HTN, Gout, L knee s/p TKR 11/22/23 presenting with R eye visual deficit in lower fields for several days, s/p outpatient ophthalmology eval showing b/l papilledema, sent to hospital for further management    Bilateral papilledema w/ R vision loss - Concern for idiopathic intracranial hypertension  - CTH/CTA/CTP negative on 11/26   - MRI brain/orbit negative  - s/p IR guided LP  - Aspirin 81mg daily  - Neuro consult appreciated  - CT Venogram noted  - Temporal Artery duplex completed, pending official read    Acute Anemia  - Most likely due to recent L TKR  - HGB baseline (12.6/37.6 on 11/1/23 on [facility])  - Iron 41  - Continue ferrous sulfate tablets    B/L knee pain, s/p L TKR w/ edema  - Restart Aspirin 81mg daily  - Oxycodone q6h PRN  - hold celebrex 200 mg BID  - Ortho consulted - no acute intervention    HTN  - Olmesartan interchange to Losartan 100mg daily    Lamas esophagus   - noted on [facility] GI progress note 11/4/21   - Pepcid 20mg daily  - Protonix 40mg daily    Urinary retention   - Flomax 0.4mg daily    Gout  - Allopurinol 300 mg daily    AISHA  -Currently not on CPAP  -Sees Dr. Askew outpatient    DVT ppx  - SCD    Dispo: DC after temporal artery ultrasound is read and normal

## 2023-11-30 NOTE — DISCHARGE NOTE PROVIDER - NSDCMRMEDTOKEN_GEN_ALL_CORE_FT
acetaZOLAMIDE 250 mg oral tablet: 2 tab(s) orally 2 times a day  allopurinol 300 mg oral tablet: 1 tab(s) orally once a day  aspirin 81 mg oral delayed release tablet: 1 tab(s) orally 2 times a day  CeleBREX 200 mg oral capsule: 1 cap(s) orally 2 times a day  olmesartan 40 mg oral tablet: 1 tab(s) orally once a day  omeprazole 40 mg oral delayed release capsule: 1 cap(s) orally once a day  oxyCODONE 30 mg oral tablet: 1 tab(s) orally 4 times a day  Pepcid 20 mg oral tablet: 1 tab(s) orally 2 times a day  tamsulosin 0.4 mg oral capsule: 1 cap(s) orally once a day

## 2023-11-30 NOTE — DISCHARGE NOTE PROVIDER - ATTENDING DISCHARGE PHYSICAL EXAMINATION:
Vital Signs Last 24 Hrs  T(F): 97.9 (30 Nov 2023 07:57), Max: 99.6 (29 Nov 2023 23:29)  HR: 83 (30 Nov 2023 07:57) (83 - 97)  BP: 115/66 (30 Nov 2023 07:57) (100/58 - 146/88)  RR: 18 (30 Nov 2023 07:57) (18 - 18)  SpO2: 94% (30 Nov 2023 07:57) (94% - 98%)    Physical Exam:  Constitutional: alert and oriented, in no acute distress   Neck: Soft and supple  Respiratory: Clear to auscultation bilaterally, no wheezes or crackles  Cardiovascular: Regular rate and rhythm, no murmurs, gallops, rubs  Gastrointestinal: Soft, non-tender to palpation, +bs  Vascular: 2+ peripheral pulses  Neurological: A/O x 3, no focal neurological deficits  Musculoskeletal: 5/5 strength b/l upper and lower extremities, no lower extremity edema bilaterally

## 2023-11-30 NOTE — PROGRESS NOTE ADULT - SUBJECTIVE AND OBJECTIVE BOX
[facility]                                     Neurology at [place]                                 Rema Walker, [facility] Scar                                  [address]                                        [place], [place], [address]                                             [phone]    HPI:  58 year old male w/ PMHx of morbid obesity AISHA (not on CPAP), Lamas's esophagus, HTN, gout, L knee s/p TKR 11/22/23 presenting to [facility] ED on 11/27 for Right eye blurry vision for approximately 7 days. Pt stated he noticed decreased Right eye in the lower quadrant after he was discharged from the hospital s/p Left knee total knee re-construction. Patient was seen prior in the ED for had a negative work up; CTH, CTA H/N and CTP. Patient was discharged home and referred to Ophthalmology. He saw the ophthalmologist who recommended he could back to the ED for bilateral papilledema. Patient denied dysphagia, dysarthria, gait abnormality photophobia, headache or neck stiffness. Patient had an unremarkable workup and the stroke team was consulted for further management.     SUBJECTIVE: States vision feels better than admission, stable, no events overnight.  No new neurologic complaints.  ROS reported negative unless otherwise noted.    MEDICATIONS  (STANDING):  acetaZOLAMIDE    Tablet 500 milliGRAM(s) Oral two times a day  allopurinol 300 milliGRAM(s) Oral daily  aspirin enteric coated 81 milliGRAM(s) Oral daily  famotidine    Tablet 20 milliGRAM(s) Oral two times a day  ferrous    sulfate 325 milliGRAM(s) Oral daily  losartan 100 milliGRAM(s) Oral daily  pantoprazole    Tablet 40 milliGRAM(s) Oral before breakfast  tamsulosin 0.4 milliGRAM(s) Oral at bedtime    MEDICATIONS  (PRN):  acetaminophen     Tablet .. 650 milliGRAM(s) Oral every 6 hours PRN Temp greater or equal to 38C (100.4F), Mild Pain (1 - 3)  aluminum hydroxide/magnesium hydroxide/simethicone Suspension 30 milliLiter(s) Oral every 4 hours PRN Dyspepsia  melatonin 3 milliGRAM(s) Oral at bedtime PRN Insomnia  ondansetron Injectable 4 milliGRAM(s) IV Push every 8 hours PRN Nausea and/or Vomiting  oxyCODONE    IR 30 milliGRAM(s) Oral every 6 hours PRN Moderate to Severe Pain    Vital Signs Last 24 Hrs  T(C): 36.6 (30 Nov 2023 07:57), Max: 37.6 (29 Nov 2023 23:29)  T(F): 97.9 (30 Nov 2023 07:57), Max: 99.6 (29 Nov 2023 23:29)  HR: 83 (30 Nov 2023 07:57) (83 - 97)  BP: 115/66 (30 Nov 2023 07:57) (100/58 - 146/88)  BP(mean): --  RR: 18 (30 Nov 2023 07:57) (18 - 18)  SpO2: 94% (30 Nov 2023 07:57) (94% - 98%)    Parameters below as of 30 Nov 2023 07:57  Patient On (Oxygen Delivery Method): room air    General: NAD,     Detailed Neurologic Exam:    Mental status: The patient is awake and alert and has normal attention span.  The patient is fully oriented in 3 spheres. The patient is able to name objects, follow commands, repeat sentences.    Cranial nerves: Pupils equal and react symmetrically to light. There is improved altitudinal vision (superior field) loss in the Right eye . Extraocular motion is full with no nystagmus. There is no ptosis. Facial sensation is intact. Facial musculature is symmetric. Palate elevates symmetrically. Tongue is midline.    Motor: There is normal bulk and tone.  There is no tremor.  Strength is 5/5 in the right arm and leg.   Strength is 5/5 in the left arm and 4+/5 leg. ( with LE painful 2/2 to recent knee replacement)     Sensation: Intact to light touch in 4 extremities    Cerebellar: There is no dysmetria on finger to nose testing.    Gait : deferred        LABS:                                   9.0    10.82 )-----------( 360      ( 30 Nov 2023 07:25 )             28.7     11-30    138  |  103  |  25.7<H>  ----------------------------<  129<H>  4.1   |  21.0<L>  |  1.11    Ca    9.7      30 Nov 2023 07:25  Phos  4.3     11-29  Mg     1.9     11-29      Sedimentation Rate, Erythrocyte (11.27.23 @ 17:20)    Sedimentation Rate, Erythrocyte: 95: [facility] performs the Erythrocyte Sedimentation  Rate assay utilizing the P[employer] Labpak Mini analyzer method. mm/hr    C-Reactive Protein, Serum (11.27.23 @ 17:20)    C-Reactive Protein, Serum: 62 mg/L      IMAGING: Reviewed by me.   MR Venogram Head w/ IV Cont (11.30.23 @ 09:12)   IMPRESSION:   Intracranial dural venous sinuses patent.  No sinus   thrombosis identifed.     Xray Knee 3 Views, Left (11.28.23 @ 06:07)   IMPRESSION: Knee replacement is unchanged. There is swelling of the   distal anterior left thigh.      From LP done by IR  Operative Findings:   Opening pressure 37 cmH20. CSF blood-tinged.   28cc CSF removed. Closing pressure [id number].    MR Head and Orbits w/wo IV Cont (11.27.23 @ 18:58)   IMPRESSION: Evaluation of the orbits and brain appears unremarkable.      CT Brain Stroke Protocol (11.26.23 @ 10:58)   IMPRESSION:    CT Head: No acute intracranial hemorrhage, mass effect or demarcated   territorial infarction.      The findings were discussed with Dr. Askew by Dr. Richard Mahoney on   11/26/2023 11:08 AM    CT Perfusion: Normal CT perfusion    Intracranial CTA: No large vessel occlusion. Examination is degraded by   suboptimal contrast bolus    Extracranial CTA: No high-grade steno-occlusive disease. Examination is   degraded by suboptimal contrast bolus.       Xray Chest 1 View- PORTABLE-Urgent (11.26.23 @ 11:56)   IMPRESSION:   No radiographic evidence of active chest disease.

## 2023-11-30 NOTE — PROGRESS NOTE ADULT - ASSESSMENT
ASSESSMENT:   58 year old male w/ PMHx of morbid obestiy, AISHA (not on CPAP), Demetris's esophagus, HTN, gout, L knee s/p TKR 11/22/23 presenting to [facility] ED on 11/27 for Right eye blurry vision for approximately 7 days. Pt stated he noticed decreased Right eye in the upper quadrant after he was discharged from the hospital s/p Left knee total knee re-construction. Patient was seen prior in the ED for had a negative work up; CTH, CTA H/N and CTP. Patient was discharged home and referred to Ophthalmology. He saw the ophthalmologist who recommended he could back to the ED for bilateral papilledema. Patient denied dysphagia, dysarthria, gait abnormality photophobia, headache or neck stiffness. Patient had an unremarkable workup and the stroke team was consulted for further management. New anemia noted this visit, he was previously told that he was iron overloaded and had been donating blood for this.    NEURO:   -Neurologically with Right superior vision field cut improving s/p lumbar puncture   -Continue close monitoring for neurologic deterioration    - Stroke neuro checks q 4hr    - SBP goal 120 -140mmHg, patient appears to be tolerating this range without acute neurological changes  -ANTITHROMBOTIC THERAPY: on ASA 81 mg  -titrate statin to LDL goal less than 70  -MRV head did not show sinus thrombosis  -Temporal Artery ultrasound done, result pending   -LP done, Opening pressure 37 cmH2O, closing pressure 28 cmH2O  -On Diamox 500 BID  -Monitor LFTs, QTc and Chemistry   -MRI Brain w/o noted  -MRI orbit noted  -Recommend serial Ophthalmological visit   -Dysphagia screen: pass/fail   -Physical therapy/OT/Speech eval/treatment.   ?association of iron deficiency anemia and papilledema (case reports noted)    - should resolve with improvement in hemoglobin      CARDIOVASCULAR:   -Check TTE, cardiac monitoring w/ telemetry for now, further evaluation pending findings of noted workup                              HEMATOLOGY:   -H/H 9.0/28.7, Platelets 360, patient should have all age and risk appropriate malignancy screenings with PCP or sooner if clinically suspected   -On Ferrous sulfate, ? heme evaluation  -LE duplex pending   -DVT ppx: Heparin s.c [] LMWH [] SCDs [x]     VASCULAR:   -Elevated ESR/CRP- possibly due to post surgical or arthritis  -Temporal Artery ultrasound result pending   -Would consider Steroid- held for now due to multiple confounding factors for elevated inflammatory marker - recent post op, h/o arthritis;   -No headache si/sx relayed by patient or appreciated on exam  -Improvement of vision (subjectively) s/p LP   MRV did not show sunus thrombosis  -PMH of gout and morbid obesity         ID:   -Afebrile, no leukocytosis, monitor for si/sx of infection     OTHER:    -Condition and plan of care d/w patient, questions and concerns addressed.   -Recommend Weight loss education -  5-10% or more given elevated ICP possible pseudotumor cerebri vs idiopathic intracranial hypertension due to anemia    DISPOSITION:   home after temporal artery US is resulted and normal      CORE MEASURES:        Admission NIHSS:      Tenecteplase : [] YES [x] NO      LDL/HDL/A1C:     Depression Screen- if depression hx and/or present      Statin Therapy:     Dysphagia Screen: [] PASS [] FAIL     Smoking [] YES [] NO      Afib [] YES [] NO     Stroke Education [] YES [] NO    Obtain screening lower extremity venous ultrasound in patients who meet 1 or more of the following criteria as patient is high risk for DVT/PE on admission:   [] History of DVT/PE  []Hypercoagulable states (Factor V Leiden, Cancer, OCP, etc. )  [x]Prolonged immobility (hemiplegia/hemiparesis/post operative or any other extended immobilization)  [] Transferred from outside facility (Rehab or Long term care)  [] Age </= to 50    discussed with Dr Murcia    will follow with you    Alexis Hodgson MD PhD   [id number]

## 2023-11-30 NOTE — DISCHARGE NOTE PROVIDER - CARE PROVIDER_API CALL
Alexis Hodgson  Neurology  [address] [place] [address], [facility] [address]  [place] [address]  Phone: [phone]  Fax: [phone]  Follow Up Time: 1 week

## 2023-11-30 NOTE — DISCHARGE NOTE PROVIDER - NSDCCPCAREPLAN_GEN_ALL_CORE_FT
PRINCIPAL DISCHARGE DIAGNOSIS  Diagnosis: Visual field loss  Assessment and Plan of Treatment: - Please continue Diamox as prescribed  - Follow up with Neurology   - Follow up with Ophthalmology

## 2023-11-30 NOTE — DISCHARGE NOTE NURSING/CASE MANAGEMENT/SOCIAL WORK - PATIENT PORTAL LINK FT
You can access the FollowMyHealth Patient Portal offered by [facility] by registering at the following website: [email or web address]. By joining [employer]’s FollowMyHealth portal, you will also be able to view your health information using other applications (apps) compatible with our system.

## 2023-11-30 NOTE — DISCHARGE NOTE PROVIDER - NSDCFUSCHEDAPPT_GEN_ALL_CORE_FT
Elebiary, Yeon J  [facility]  ORTHOSURG [address]  Scheduled Appointment: 12/12/2023    Valente Prater  [facility]  ORTHOSURG [address]  Scheduled Appointment: 01/31/2024

## 2023-11-30 NOTE — DISCHARGE NOTE PROVIDER - HOSPITAL COURSE
57 y/o M with PMH of morbid obesity, AISHA, Lamas's esophagus, HTN, gout, presented with right eye visual deficit. He was evaluated outpatient by Opthalmology showing bilateral papilledema. CT head/CTA/P were negative. Neurology was consulted and patient underwent IR guided LP. CT venogram was performed which ruled out sinus thrombosis. Temporal artery ultrasound was performed which was negative for acute findings. The patient is hemodynamically stable for discharge with appropriate follow up.

## 2023-11-30 NOTE — PROGRESS NOTE ADULT - SUBJECTIVE AND OBJECTIVE BOX
Chief complaint: Right vision loss    Patient seen and examined at bedside. No acute overnight events reported. No fever, chills, cough, nausea or vomiting.     Vital Signs Last 24 Hrs  T(F): 97.9 (30 Nov 2023 07:57), Max: 99.6 (29 Nov 2023 23:29)  HR: 83 (30 Nov 2023 07:57) (83 - 97)  BP: 115/66 (30 Nov 2023 07:57) (100/58 - 146/88)  RR: 18 (30 Nov 2023 07:57) (18 - 18)  SpO2: 94% (30 Nov 2023 07:57) (94% - 98%)    Physical Exam:  Constitutional: alert and oriented, in no acute distress   Neck: Soft and supple  Respiratory: Clear to auscultation bilaterally  Cardiovascular: Regular rate and rhythm  Gastrointestinal: Soft, non-tender to palpation, +bs  Vascular: 2+ peripheral pulses  Neurological: A/O x 3  Musculoskeletal: no lower extremity edema bilaterally    Labs:                        9.0    10.82 )-----------( 360      ( 30 Nov 2023 07:25 )             28.7   11-30    138  |  103  |  25.7<H>  ----------------------------<  129<H>  4.1   |  21.0<L>  |  1.11    Ca    9.7      30 Nov 2023 07:25  Phos  4.3     11-29  Mg     1.9     11-29

## 2023-12-03 LAB
CULTURE RESULTS: SIGNIFICANT CHANGE UP
CULTURE RESULTS: SIGNIFICANT CHANGE UP
SPECIMEN SOURCE: SIGNIFICANT CHANGE UP
SPECIMEN SOURCE: SIGNIFICANT CHANGE UP

## 2023-12-06 PROBLEM — K22.70 BARRETT'S ESOPHAGUS WITHOUT DYSPLASIA: Chronic | Status: ACTIVE | Noted: 2023-11-28

## 2023-12-07 ENCOUNTER — APPOINTMENT (OUTPATIENT)
Dept: NEUROLOGY | Facility: CLINIC | Age: 58
End: 2023-12-07
Payer: MEDICARE

## 2023-12-07 VITALS
DIASTOLIC BLOOD PRESSURE: 68 MMHG | WEIGHT: 305 LBS | HEIGHT: 72 IN | BODY MASS INDEX: 41.31 KG/M2 | SYSTOLIC BLOOD PRESSURE: 120 MMHG

## 2023-12-07 DIAGNOSIS — G93.2 BENIGN INTRACRANIAL HYPERTENSION: ICD-10-CM

## 2023-12-07 PROCEDURE — 99214 OFFICE O/P EST MOD 30 MIN: CPT

## 2023-12-07 RX ORDER — ALLOPURINOL 200 MG/1
TABLET ORAL
Refills: 0 | Status: COMPLETED | COMMUNITY
End: 2023-12-07

## 2023-12-07 RX ORDER — ALLOPURINOL 300 MG/1
300 TABLET ORAL
Refills: 0 | Status: ACTIVE | COMMUNITY

## 2023-12-07 RX ORDER — SODIUM SULFATE, POTASSIUM SULFATE, MAGNESIUM SULFATE 17.5; 3.13; 1.6 G/ML; G/ML; G/ML
17.5-3.13-1.6 SOLUTION, CONCENTRATE ORAL
Qty: 1 | Refills: 0 | Status: COMPLETED | COMMUNITY
Start: 2021-04-19 | End: 2023-12-07

## 2023-12-07 RX ORDER — DICLOFENAC SODIUM 1% 10 MG/G
1 GEL TOPICAL
Qty: 100 | Refills: 0 | Status: COMPLETED | COMMUNITY
Start: 2021-03-30 | End: 2023-12-07

## 2023-12-07 RX ORDER — CELECOXIB 200 MG/1
200 CAPSULE ORAL
Refills: 0 | Status: ACTIVE | COMMUNITY

## 2023-12-07 RX ORDER — GABAPENTIN 100 MG/1
100 CAPSULE ORAL
Qty: 90 | Refills: 0 | Status: COMPLETED | COMMUNITY
Start: 2022-04-04 | End: 2023-12-07

## 2023-12-07 RX ORDER — ACETAMINOPHEN 325 MG/1
325 TABLET ORAL
Refills: 0 | Status: ACTIVE | COMMUNITY

## 2023-12-07 RX ORDER — ANASTROZOLE TABLETS 1 MG/1
1 TABLET ORAL
Qty: 12 | Refills: 0 | Status: COMPLETED | COMMUNITY
Start: 2020-12-28 | End: 2023-12-07

## 2023-12-07 RX ORDER — OLMESARTAN MEDOXOMIL 40 MG/1
40 TABLET, FILM COATED ORAL
Refills: 0 | Status: ACTIVE | COMMUNITY

## 2023-12-07 RX ORDER — OXYCODONE HYDROCHLORIDE 30 MG/1
30 TABLET ORAL
Qty: 180 | Refills: 0 | Status: COMPLETED | COMMUNITY
Start: 2022-11-11 | End: 2023-12-07

## 2023-12-07 RX ORDER — COLCHICINE 0.6 MG/1
0.6 TABLET ORAL
Qty: 30 | Refills: 0 | Status: COMPLETED | COMMUNITY
Start: 2018-02-21 | End: 2023-12-07

## 2023-12-07 RX ORDER — OXYCODONE 10 MG/1
10 TABLET ORAL
Qty: 60 | Refills: 0 | Status: COMPLETED | COMMUNITY
Start: 2018-04-28 | End: 2023-12-07

## 2023-12-07 RX ORDER — ACETAZOLAMIDE 250 MG
250 TABLET ORAL
Refills: 0 | Status: ACTIVE | COMMUNITY

## 2023-12-07 RX ORDER — ASPIRIN 81 MG
81 TABLET, DELAYED RELEASE (ENTERIC COATED) ORAL
Refills: 0 | Status: ACTIVE | COMMUNITY

## 2023-12-10 ENCOUNTER — NON-APPOINTMENT (OUTPATIENT)
Age: 58
End: 2023-12-10

## 2023-12-12 ENCOUNTER — APPOINTMENT (OUTPATIENT)
Dept: ORTHOPEDIC SURGERY | Facility: CLINIC | Age: 58
End: 2023-12-12
Payer: MEDICARE

## 2023-12-12 VITALS
SYSTOLIC BLOOD PRESSURE: 124 MMHG | HEIGHT: 73 IN | HEART RATE: 106 BPM | BODY MASS INDEX: 41.75 KG/M2 | WEIGHT: 315 LBS | DIASTOLIC BLOOD PRESSURE: 74 MMHG

## 2023-12-12 PROCEDURE — 73562 X-RAY EXAM OF KNEE 3: CPT | Mod: LT

## 2023-12-12 PROCEDURE — 99024 POSTOP FOLLOW-UP VISIT: CPT

## 2023-12-13 PROCEDURE — 85610 PROTHROMBIN TIME: CPT

## 2023-12-13 PROCEDURE — 70545 MR ANGIOGRAPHY HEAD W/DYE: CPT

## 2023-12-13 PROCEDURE — C1729: CPT

## 2023-12-13 PROCEDURE — 86803 HEPATITIS C AB TEST: CPT

## 2023-12-13 PROCEDURE — 70496 CT ANGIOGRAPHY HEAD: CPT | Mod: MA

## 2023-12-13 PROCEDURE — 82945 GLUCOSE OTHER FLUID: CPT

## 2023-12-13 PROCEDURE — 85730 THROMBOPLASTIN TIME PARTIAL: CPT

## 2023-12-13 PROCEDURE — 84157 ASSAY OF PROTEIN OTHER: CPT

## 2023-12-13 PROCEDURE — 97163 PT EVAL HIGH COMPLEX 45 MIN: CPT

## 2023-12-13 PROCEDURE — 85027 COMPLETE CBC AUTOMATED: CPT

## 2023-12-13 PROCEDURE — 99285 EMERGENCY DEPT VISIT HI MDM: CPT

## 2023-12-13 PROCEDURE — 86900 BLOOD TYPING SEROLOGIC ABO: CPT

## 2023-12-13 PROCEDURE — 87205 SMEAR GRAM STAIN: CPT

## 2023-12-13 PROCEDURE — 82728 ASSAY OF FERRITIN: CPT

## 2023-12-13 PROCEDURE — 83735 ASSAY OF MAGNESIUM: CPT

## 2023-12-13 PROCEDURE — 70498 CT ANGIOGRAPHY NECK: CPT | Mod: MA

## 2023-12-13 PROCEDURE — 71045 X-RAY EXAM CHEST 1 VIEW: CPT

## 2023-12-13 PROCEDURE — 83550 IRON BINDING TEST: CPT

## 2023-12-13 PROCEDURE — 0042T: CPT | Mod: MA

## 2023-12-13 PROCEDURE — 83540 ASSAY OF IRON: CPT

## 2023-12-13 PROCEDURE — 86850 RBC ANTIBODY SCREEN: CPT

## 2023-12-13 PROCEDURE — 84100 ASSAY OF PHOSPHORUS: CPT

## 2023-12-13 PROCEDURE — 80053 COMPREHEN METABOLIC PANEL: CPT

## 2023-12-13 PROCEDURE — 83615 LACTATE (LD) (LDH) ENZYME: CPT

## 2023-12-13 PROCEDURE — 85652 RBC SED RATE AUTOMATED: CPT

## 2023-12-13 PROCEDURE — 82962 GLUCOSE BLOOD TEST: CPT

## 2023-12-13 PROCEDURE — 85025 COMPLETE CBC W/AUTO DIFF WBC: CPT

## 2023-12-13 PROCEDURE — 86140 C-REACTIVE PROTEIN: CPT

## 2023-12-13 PROCEDURE — 36415 COLL VENOUS BLD VENIPUNCTURE: CPT

## 2023-12-13 PROCEDURE — 73562 X-RAY EXAM OF KNEE 3: CPT

## 2023-12-13 PROCEDURE — 87070 CULTURE OTHR SPECIMN AEROBIC: CPT

## 2023-12-13 PROCEDURE — 77003 FLUOROGUIDE FOR SPINE INJECT: CPT

## 2023-12-13 PROCEDURE — 86780 TREPONEMA PALLIDUM: CPT

## 2023-12-13 PROCEDURE — 84466 ASSAY OF TRANSFERRIN: CPT

## 2023-12-13 PROCEDURE — 97110 THERAPEUTIC EXERCISES: CPT

## 2023-12-13 PROCEDURE — 70543 MRI ORBT/FAC/NCK W/O &W/DYE: CPT | Mod: MA

## 2023-12-13 PROCEDURE — 70553 MRI BRAIN STEM W/O & W/DYE: CPT | Mod: MA

## 2023-12-13 PROCEDURE — 97116 GAIT TRAINING THERAPY: CPT

## 2023-12-13 PROCEDURE — 70450 CT HEAD/BRAIN W/O DYE: CPT | Mod: MA

## 2023-12-13 PROCEDURE — 86901 BLOOD TYPING SEROLOGIC RH(D): CPT

## 2023-12-13 PROCEDURE — 87483 CNS DNA AMP PROBE TYPE 12-25: CPT

## 2023-12-13 PROCEDURE — 93005 ELECTROCARDIOGRAM TRACING: CPT

## 2023-12-13 PROCEDURE — 89051 BODY FLUID CELL COUNT: CPT

## 2023-12-13 PROCEDURE — 62270 DX LMBR SPI PNXR: CPT

## 2023-12-13 PROCEDURE — 93998 UNLISTD NONINVAS VASC DX STD: CPT

## 2023-12-13 PROCEDURE — 80048 BASIC METABOLIC PNL TOTAL CA: CPT

## 2023-12-15 ENCOUNTER — TRANSCRIPTION ENCOUNTER (OUTPATIENT)
Age: 58
End: 2023-12-15

## 2024-01-02 ENCOUNTER — NON-APPOINTMENT (OUTPATIENT)
Age: 59
End: 2024-01-02

## 2024-01-31 ENCOUNTER — APPOINTMENT (OUTPATIENT)
Dept: ORTHOPEDIC SURGERY | Facility: CLINIC | Age: 59
End: 2024-01-31
Payer: MEDICARE

## 2024-01-31 VITALS
DIASTOLIC BLOOD PRESSURE: 86 MMHG | HEIGHT: 73 IN | BODY MASS INDEX: 41.75 KG/M2 | SYSTOLIC BLOOD PRESSURE: 143 MMHG | HEART RATE: 102 BPM | WEIGHT: 315 LBS

## 2024-01-31 DIAGNOSIS — Z47.1 AFTERCARE FOLLOWING JOINT REPLACEMENT SURGERY: ICD-10-CM

## 2024-01-31 DIAGNOSIS — Z96.652 PRESENCE OF LEFT ARTIFICIAL KNEE JOINT: ICD-10-CM

## 2024-01-31 DIAGNOSIS — Z96.652 AFTERCARE FOLLOWING JOINT REPLACEMENT SURGERY: ICD-10-CM

## 2024-01-31 PROCEDURE — 99024 POSTOP FOLLOW-UP VISIT: CPT

## 2024-01-31 PROCEDURE — 73562 X-RAY EXAM OF KNEE 3: CPT | Mod: LT

## 2024-01-31 NOTE — END OF VISIT
[FreeTextEntry3] : I, Rosemary Arrington, acted solely as a scribe for Dr. Valente Prater on 01/31/2024

## 2024-01-31 NOTE — HISTORY OF PRESENT ILLNESS
[Clean/Dry/Intact] : clean, dry and intact [Healed] : healed [1] : the patient reports pain that is 1/10 in severity [Xray (Date:___)] : [unfilled] Xray -  [Chills] : no chills [Constipation] : no constipation [Diarrhea] : no diarrhea [Dysuria] : no dysuria [Fever] : no fever [Nausea] : no nausea [Vomiting] : no vomiting [Erythema] : not erythematous [Discharge] : absent of discharge [Swelling] : not swollen [Dehiscence] : not dehisced [de-identified] : s/p Left total knee arthroplasty.  Computer-assisted tibial resection, OrthAlign procedure DOS:11/21/23.   [de-identified] : The patient is 2 months postop left total knee arthroplasty.  He reports overall he is doing very well.  He completed a course of physical therapy and made excellent progress he has no pain at the knee at this time.  The patient did lose some eyesight in the surgery and is seeing an ophthalmologist who believes he may have a retinal artery occlusion.  He has gotten some eyesight back provide is still having issues with his eyesight [de-identified] : Left knee range of motion 0/120 well-healed incision no sign of infection [de-identified] : Three-view imaging of the left knee shows well-fixed implants no sign of loosening or wear [de-identified] : Postoperatively, the patient is doing well, has excellent pain control and is showing no signs of infection. [de-identified] : Patient will continue with the physical therapy and low impact exercise. I discussed use of antibiotic before dental work for 2 years. F/u with us a year from surgery.

## 2024-02-15 ENCOUNTER — APPOINTMENT (OUTPATIENT)
Dept: PULMONOLOGY | Facility: CLINIC | Age: 59
End: 2024-02-15

## 2024-02-28 ENCOUNTER — TRANSCRIPTION ENCOUNTER (OUTPATIENT)
Age: 59
End: 2024-02-28

## 2024-04-05 RX ORDER — HYALURONATE SODIUM 20 MG/2 ML
20 SYRINGE (ML) INTRAARTICULAR
Qty: 3 | Refills: 0 | Status: ACTIVE | OUTPATIENT
Start: 2024-04-04

## 2024-04-10 ENCOUNTER — APPOINTMENT (OUTPATIENT)
Dept: ORTHOPEDIC SURGERY | Facility: CLINIC | Age: 59
End: 2024-04-10
Payer: MEDICARE

## 2024-04-10 PROCEDURE — 20610 DRAIN/INJ JOINT/BURSA W/O US: CPT | Mod: RT

## 2024-04-10 PROCEDURE — 99213 OFFICE O/P EST LOW 20 MIN: CPT | Mod: 25

## 2024-04-10 NOTE — REASON FOR VISIT
[Follow-Up Visit] : a follow-up visit for [Other: ____] : [unfilled] [FreeTextEntry2] : Euflexxa #1 lot #[id number] exp 04/19/25

## 2024-04-10 NOTE — PHYSICAL EXAM
[de-identified] : GENERAL APPEARANCE: Well nourished and hydrated, pleasant, alert, and oriented x 3. Appears their stated age.  HEENT: Normocephalic, extraocular eye motion intact. Nasal septum midline. Oral cavity clear. External auditory canal clear.  RESPIRATORY: Breath sounds clear and audible in all lobes. No wheezing, No accessory muscle use.  CARDIOVASCULAR: No apparent abnormalities. No lower leg edema. No varicosities. Pedal pulses are palpable.  NEUROLOGIC: Sensation is normal, no muscle weakness in the upper or lower extremities.  DERMATOLOGIC: No apparent skin lesions, moist, warm, no rash.  SPINE: Cervical spine appears normal and moves freely; thoracic spine appears normal and moves freely; lumbosacral spine appears normal and moves freely, normal, nontender.  MUSCULOSKELETAL: Hands, wrists, and elbows are normal and move freely, shoulders are normal and move freely.  Musculoskeletal:. Bilateral knee exam shows medial jointline tenderness and lateral jointline tenderness, ROM 0-120. moderate left knee effusion is present  5/5 motor strength in bilateral lower extremities. Sensory: Intact in bilateral lower extremities. DTRs: Biceps, brachioradialis, triceps, patellar, ankle and plantar 2+ and symmetric bilaterally. Pulses: dorsalis pedis, posterior tibial, femoral, popliteal, and radial 2+ and symmetric bilaterally.

## 2024-04-10 NOTE — PROCEDURE
[de-identified] :  I injected the patient's right knee  Euflexxa #1 lot #[id number] exp 04/19/25. Knee injection visco-supplementation: I discussed at length with the patient the planned steroid and lidocaine injection for primary osteoarthritis. The risks, benefits, convalescence and alternatives were reviewed and pt consented for injection. The possible side effects discussed included but were not limited to: pain, swelling, heat and redness. There symptoms are generally mild but if they are extensive then contact the office. Giving pain relievers by mouth such as NSAIDs or Tylenol can generally treat the reactions to injection. Rare cases of infection have been noted. Rash, hives and itching may occur post injection. If you have muscle pain or cramps, flushing and or swelling of the face, rapid heart beat, nausea, dizziness, fever, chills, headache, difficulty breathing, swelling in the arms or legs, or have a prickly feeling of your skin, contact a health care provider immediately. Following this discussion, the knee was prepped with alcohol and under sterile condition the injection was performed through a superolateral injection site with a 20 gauge needle. The needle was introduced into the joint, aspiration was performed to ensure intra-articular placement and the medication was injected. Upon withdrawal of the needle the site was cleaned with alcohol and a band aid applied. The patient tolerated the injection well and there were no adverse effects. Post injection instructions included no strenuous activity for 24 hours, cryotherapy and if there are any adverse effects to contact the office.

## 2024-04-10 NOTE — DISCUSSION/SUMMARY
[de-identified] : Patient is a 58-year-old male who presents today for his first Euflexxa injection.  He is doing well status post left total knee arthroplasty.  He will see us next week for the second injection.

## 2024-04-10 NOTE — HISTORY OF PRESENT ILLNESS
[de-identified] : Patient is a 58-year-old male here for follow-up of right knee pain.  Patient is status post left total knee arthroplasty 3 months ago and reports his left knee is doing very well.  He is here today to start gel injections on the right knee.  He is going to [place] in May and would like to proceed with surgery sometime after this trip

## 2024-04-16 ENCOUNTER — APPOINTMENT (OUTPATIENT)
Dept: NEUROLOGY | Facility: CLINIC | Age: 59
End: 2024-04-16

## 2024-04-16 ENCOUNTER — APPOINTMENT (OUTPATIENT)
Dept: ORTHOPEDIC SURGERY | Facility: CLINIC | Age: 59
End: 2024-04-16
Payer: MEDICARE

## 2024-04-16 PROCEDURE — 20610 DRAIN/INJ JOINT/BURSA W/O US: CPT | Mod: RT

## 2024-04-16 NOTE — PROCEDURE
[de-identified] : Pt received right knee 2/3 Euflexxa injection for o.a Knee injection viscosupplementation: I discussed at length with the patient the planned H.A injection for primary osteoarthritis. The risks, benefits, convalescence and alternatives were reviewed and pt consented for injection. The possible side effects discussed included but were not limited to: pain, swelling, heat, stiffness and fullness. There symptoms are generally mild but if they are extensive then contact the office. Giving pain relievers by mouth such as NSAID's or Tylenol can generally treat the reactions to injection. Rare cases of infection have been noted. Rash, hives and itching may occur post injection. If you have muscle pain or cramps, flushing and or swelling of the face, rapid heart beat, nausea, dizziness, fever, chills, headache, difficulty breathing, swelling in the arms or legs, or have a prickly feeling of your skin, contact a health care provider immediately. Following this discussion, the knee was prepped with alcohol and under sterile condition the injection was performed through a superolateral injection site with a 20 gauge needle. The needle was introduced into the joint, aspiration was performed to ensure intra-articular placement and the medication was injected. Upon withdrawal of the needle the site was cleaned with alcohol and a band aid applied. The patient tolerated the injection well and there were no adverse effects. Post injection instructions included no strenuous activity for 24 hours, cryotherapy and if there are any adverse effects to contact the office.

## 2024-04-16 NOTE — DISCUSSION/SUMMARY
[de-identified] : Patient is a 58-year-old male with end-stage right knee osteoarthritis , patient is a candidate for a right knee replacement. he is doing well status post left total knee arthroplasty on November 21, 2023.  he had cortisone injection with good relief , patient presents today for his second Euflexxa injection.  He is chronic opioid user (oxycodone 30 mg) for back pain under the pain managemen. Pt had history of sleep apnea but after he lost some weight it went away he is not using CPAP or BiPAP he denies a history of diabetes he does have hypertension and takes the medication.   He will see us next week for the 3rd injection.  The patient is a  58 year individual with end stage arthritis of their right  knee joint. Based upon the patient's continued symptoms and failure to respond to conservative treatment , including Tylenol and anti-inflammatories, physical therapy, activity modification , local modalities such as apply ice or heat, and injections. I have recommended a total knee arthroplasty for this patient. A long discussion took place with the patient describing what a total joint replacement is and what the procedure would entail. A total knee arthroplasty model, similar to the implant that was used during the operation, was utilized to demonstrate and to discuss the various bearing surfaces of the implants. The hospitalization and post-operative care and rehabilitation were also discussed. The use of perioperative antibiotics and DVT prophylaxis were discussed. The risk, benefits and alternatives to a surgical intervention were discussed at length with the patient. The patient was also advised of risks related to the medical comorbidities, elevated body mass index (BMI), and smoking where applicable. We discussed how to reduce modifiable risk factors and encouraged smoking cessation were applicable.. A lengthy discussion took place to review the most common complications including but not limited to: deep vein thrombosis, pulmonary embolus, heart attack, stroke, infection, wound breakdown, numbness, damage to nerves, tendon, muscles, arteries or other blood vessels, death and other possible complications from anesthesia. The patient was told that we will take steps to minimize these risks by using sterile technique, antibiotics and DVT prophylaxis when appropriate and follow the patient postoperatively in the office setting to monitor progress. The possibility of recurrent pain, no improvement in pain and actual worsening of pain were also discussed with the patient. The discharge plan of care focused on the patient going home following surgery. The patient was encouraged to make the necessary arrangements to have someone stay with them when they are discharged home. Following discharge, a home care nurse was to the patient. The home care nurse would open the patients home care case and request home physical therapy services. Home physical therapy was to commence following discharge provided it was appropriate and covered by the health insurance benefit plan.  The benefits of surgery were discussed with the patient including the potential for improving her current clinical condition through operative intervention. Alternatives to surgical intervention including continued conservative management were also discussed in detail. All questions were answered to the satisfaction of the patient. The treatment plan of care, as well as a model of a total knee arthroplasty equivalent to the one that will be used for their total joint replacement, was shared with the patient. The patient agreed to the plan of care as well as the use of implants in their total joint replacement.

## 2024-04-23 ENCOUNTER — APPOINTMENT (OUTPATIENT)
Dept: ORTHOPEDIC SURGERY | Facility: CLINIC | Age: 59
End: 2024-04-23
Payer: MEDICARE

## 2024-04-23 DIAGNOSIS — M17.11 UNILATERAL PRIMARY OSTEOARTHRITIS, RIGHT KNEE: ICD-10-CM

## 2024-04-23 PROCEDURE — 20610 DRAIN/INJ JOINT/BURSA W/O US: CPT | Mod: RT

## 2024-04-23 NOTE — REASON FOR VISIT
[Follow-Up Visit] : a follow-up visit for [Other: ____] : [unfilled] [FreeTextEntry2] : Euflexxa #3 right knee lot #[id number] exp 04/19/25.

## 2024-04-23 NOTE — DISCUSSION/SUMMARY
[de-identified] : Patient is a 58-year-old male with end-stage right knee osteoarthritis , patient is a candidate for a right knee replacement. he is doing well status post left total knee arthroplasty on November 21, 2023.  he had cortisone injection with good relief , patient presents today for his 3rd Euflexxa injection.  He is chronic opioid user (oxycodone 30 mg) for back pain under the pain managemen. Pt had history of sleep apnea but after he lost some weight it went away he is not using CPAP or BiPAP he denies a history of diabetes he does have hypertension and takes the medication. he reports good releif with Euflexxa. He would like to repeat ha injection in 6m  The patient is a  58 year individual with end stage arthritis of their right  knee joint. Based upon the patient's continued symptoms and failure to respond to conservative treatment , including Tylenol and anti-inflammatories, physical therapy, activity modification , local modalities such as apply ice or heat, and injections. I have recommended a total knee arthroplasty for this patient. A long discussion took place with the patient describing what a total joint replacement is and what the procedure would entail. A total knee arthroplasty model, similar to the implant that was used during the operation, was utilized to demonstrate and to discuss the various bearing surfaces of the implants. The hospitalization and post-operative care and rehabilitation were also discussed. The use of perioperative antibiotics and DVT prophylaxis were discussed. The risk, benefits and alternatives to a surgical intervention were discussed at length with the patient. The patient was also advised of risks related to the medical comorbidities, elevated body mass index (BMI), and smoking where applicable. We discussed how to reduce modifiable risk factors and encouraged smoking cessation were applicable.. A lengthy discussion took place to review the most common complications including but not limited to: deep vein thrombosis, pulmonary embolus, heart attack, stroke, infection, wound breakdown, numbness, damage to nerves, tendon, muscles, arteries or other blood vessels, death and other possible complications from anesthesia. The patient was told that we will take steps to minimize these risks by using sterile technique, antibiotics and DVT prophylaxis when appropriate and follow the patient postoperatively in the office setting to monitor progress. The possibility of recurrent pain, no improvement in pain and actual worsening of pain were also discussed with the patient. The discharge plan of care focused on the patient going home following surgery. The patient was encouraged to make the necessary arrangements to have someone stay with them when they are discharged home. Following discharge, a home care nurse was to the patient. The home care nurse would open the patients home care case and request home physical therapy services. Home physical therapy was to commence following discharge provided it was appropriate and covered by the health insurance benefit plan.  The benefits of surgery were discussed with the patient including the potential for improving her current clinical condition through operative intervention. Alternatives to surgical intervention including continued conservative management were also discussed in detail. All questions were answered to the satisfaction of the patient. The treatment plan of care, as well as a model of a total knee arthroplasty equivalent to the one that will be used for their total joint replacement, was shared with the patient. The patient agreed to the plan of care as well as the use of implants in their total joint replacement.

## 2024-04-23 NOTE — PROCEDURE
[de-identified] : Pt received right knee 3/3 Euflexxa injection for o.a Knee injection viscosupplementation: I discussed at length with the patient the planned H.A injection for primary osteoarthritis. The risks, benefits, convalescence and alternatives were reviewed and pt consented for injection. The possible side effects discussed included but were not limited to: pain, swelling, heat, stiffness and fullness. There symptoms are generally mild but if they are extensive then contact the office. Giving pain relievers by mouth such as NSAID's or Tylenol can generally treat the reactions to injection. Rare cases of infection have been noted. Rash, hives and itching may occur post injection. If you have muscle pain or cramps, flushing and or swelling of the face, rapid heart beat, nausea, dizziness, fever, chills, headache, difficulty breathing, swelling in the arms or legs, or have a prickly feeling of your skin, contact a health care provider immediately. Following this discussion, the knee was prepped with alcohol and under sterile condition the injection was performed through a superolateral injection site with a 20 gauge needle. The needle was introduced into the joint, aspiration was performed to ensure intra-articular placement and the medication was injected. Upon withdrawal of the needle the site was cleaned with alcohol and a band aid applied. The patient tolerated the injection well and there were no adverse effects. Post injection instructions included no strenuous activity for 24 hours, cryotherapy and if there are any adverse effects to contact the office.

## 2024-04-24 ENCOUNTER — NON-APPOINTMENT (OUTPATIENT)
Age: 59
End: 2024-04-24

## 2024-05-30 RX ORDER — AMOXICILLIN 500 MG/1
500 TABLET, FILM COATED ORAL
Qty: 4 | Refills: 2 | Status: ACTIVE | COMMUNITY
Start: 2024-05-30 | End: 1900-01-01

## 2024-05-30 RX ORDER — CLINDAMYCIN HYDROCHLORIDE 150 MG/1
150 CAPSULE ORAL
Qty: 3 | Refills: 1 | Status: ACTIVE | COMMUNITY
Start: 2024-05-30 | End: 1900-01-01

## 2024-07-26 ENCOUNTER — APPOINTMENT (OUTPATIENT)
Dept: ORTHOPEDIC SURGERY | Facility: CLINIC | Age: 59
End: 2024-07-26
Payer: MEDICARE

## 2024-07-26 VITALS
SYSTOLIC BLOOD PRESSURE: 122 MMHG | HEART RATE: 100 BPM | WEIGHT: 315 LBS | HEIGHT: 73 IN | BODY MASS INDEX: 41.75 KG/M2 | DIASTOLIC BLOOD PRESSURE: 82 MMHG

## 2024-07-26 DIAGNOSIS — M17.11 UNILATERAL PRIMARY OSTEOARTHRITIS, RIGHT KNEE: ICD-10-CM

## 2024-07-26 DIAGNOSIS — Z79.891 LONG TERM (CURRENT) USE OF OPIATE ANALGESIC: ICD-10-CM

## 2024-07-26 DIAGNOSIS — G47.33 OBSTRUCTIVE SLEEP APNEA (ADULT) (PEDIATRIC): ICD-10-CM

## 2024-07-26 PROCEDURE — 20610 DRAIN/INJ JOINT/BURSA W/O US: CPT | Mod: RT

## 2024-07-26 PROCEDURE — 73564 X-RAY EXAM KNEE 4 OR MORE: CPT | Mod: RT

## 2024-07-26 PROCEDURE — 99214 OFFICE O/P EST MOD 30 MIN: CPT | Mod: 25

## 2024-07-26 NOTE — HISTORY OF PRESENT ILLNESS
[Pain Location] : pain [Worsening] : worsening [___ yrs] : [unfilled] year(s) ago [7] : a current pain level of 7/10 [8] : a maximum pain level of 8/10 [Walking] : worsened by walking [Opioid Analgesics] : relieved by opioid analgesics [Rest] : relieved by rest [de-identified] : This is a 58-year-old male with right knee pain he has end-stage knee osteoarthritis and he had cortisone injection which gave him good relief.  He is under the care of a pain management taking oxycodone 30 mg for back pain and knee pain. He underwent left total knee arthroplasty on November 21, 2023 he is doing very well with the left knee  Regards to his right knee , he has trouble with long standing walking and climbing stairs.   Patient requests repeat injection today.  He informs us that he has a eyesight issue.  And the ophthalmologist recommending elevated blood pressure during the anesthesia.  pt had history of sleep apnea but after he lost some weight it went away he is not using CPAP or BiPAP he denies a history of diabetes he does have hypertension and takes the medication  .

## 2024-07-26 NOTE — PHYSICAL EXAM
[Antalgic] : antalgic [de-identified] : GENERAL APPEARANCE: Well nourished and hydrated, pleasant, alert, and oriented x 3. Appears their stated age.  HEENT: Normocephalic, extraocular eye motion intact. Nasal septum midline. Oral cavity clear. External auditory canal clear.  RESPIRATORY: Breath sounds clear and audible in all lobes. No wheezing, No accessory muscle use. CARDIOVASCULAR: No apparent abnormalities. No lower leg edema. No varicosities. Pedal pulses are palpable. NEUROLOGIC: Sensation is normal, no muscle weakness in the upper or lower extremities. DERMATOLOGIC: No apparent skin lesions, moist, warm, no rash. SPINE: Cervical spine appears normal and moves freely; thoracic spine appears normal and moves freely; lumbosacral spine appears normal and moves freely, normal, nontender. MUSCULOSKELETAL: Hands, wrists, and elbows are normal and move freely, shoulders are normal and move freely.  Musculoskeletal 5/5 motor strength in bilateral lower extremities. Sensory: Intact in bilateral lower extremities. DTRs: Biceps, brachioradialis, triceps, patellar, ankle and plantar 2+ and symmetric bilaterally. Pulses: dorsalis pedis, posterior tibial, femoral, popliteal, and radial 2+ and symmetric bilaterally.  Constitutional: Alert and in no acute distress, but well-appearing.   [de-identified] : Right knee examination shows mild vlagus alignment range of motion is at 0 to 110 degree with pain No erythema no significant joint effusion [de-identified] : 4 view of right knee x-ray shows lateral compartment bone-on-bone end-stage osteoarthritis

## 2024-07-26 NOTE — PROCEDURE
[de-identified] : Patient received right knee 80mg cortisone injection for osteoarthritis  I discussed at length with the patient the planned steroid and lidocaine injection. The risks, benefits, convalescence and alternatives were reviewed. The possible side effects discussed included but were not limited to: pain, swelling, heat, bleeding, and redness. Symptoms are generally mild but if they are extensive then contact the office. Giving pain relievers by mouth such as NSAIDs or Tylenol can generally treat the reactions to steroid and lidocaine. Rare cases of infection have been noted. Rash, hives and itching may occur post injection. If you have muscle pain or cramps, flushing and or swelling of the face, rapid heart beat, nausea, dizziness, fever, chills, headache, difficulty breathing, swelling in the arms or legs, or have a prickly feeling of your skin, contact a health care provider immediately. Following this discussion, the knee was prepped with Alcohol and under sterile condition the 80 mg Depo-Medrol and 6 cc Lidocaine injection was performed with a 20 gauge needle through a superolateral injection site. The needle was introduced into the joint, aspiration was performed to ensure intra-articular placement and the medication was injected. Upon withdrawal of the needle the site was cleaned with alcohol and a band aid applied. The patient tolerated the injection well and there were no adverse effects. Post injection instructions included no strenuous activity for 24 hours, cryotherapy and if there are any adverse effects to contact the office.

## 2024-07-26 NOTE — DISCUSSION/SUMMARY
[de-identified] : Patient is a 59-year-old male with end-stage lateral compartment bone-on-bone  right knee osteoarthritis , patient is a candidate for a right knee replacement. he is doing well status post left total knee arthroplasty on November 21, 2023.  he had cortisone injection with good relief , patient failed on Euflexxa injection.  He is chronic opioid user (oxycodone 30 mg) for back pain under the pain management. Pt had history of sleep apnea but after he lost some weight it went away he is not using CPAP or BiPAP he denies a history of diabetes he does have hypertension and takes the medication.  Patient would like to proceed with right total knee arthroplasty.  I request patients to bring written recommendation from ophthalmologist Regarding keeping the blood pressure up during the anesthesia.  Patient. opted for right knee cortisone injection today and tolerated procedure well.  Patient will return to office in 2-month for presurgical counseling with Dr. Prater  The patient is a  59 year individual with end stage arthritis of their right  knee joint. Based upon the patient's continued symptoms and failure to respond to conservative treatment , including Tylenol and anti-inflammatories, physical therapy, activity modification , local modalities such as apply ice or heat, and injections. I have recommended a total knee arthroplasty for this patient. A long discussion took place with the patient describing what a total joint replacement is and what the procedure would entail. A total knee arthroplasty model, similar to the implant that was used during the operation, was utilized to demonstrate and to discuss the various bearing surfaces of the implants. The hospitalization and post-operative care and rehabilitation were also discussed. The use of perioperative antibiotics and DVT prophylaxis were discussed. The risk, benefits and alternatives to a surgical intervention were discussed at length with the patient. The patient was also advised of risks related to the medical comorbidities, elevated body mass index (BMI), and smoking where applicable. We discussed how to reduce modifiable risk factors and encouraged smoking cessation were applicable.. A lengthy discussion took place to review the most common complications including but not limited to: deep vein thrombosis, pulmonary embolus, heart attack, stroke, infection, wound breakdown, numbness, damage to nerves, tendon, muscles, arteries or other blood vessels, death and other possible complications from anesthesia. The patient was told that we will take steps to minimize these risks by using sterile technique, antibiotics and DVT prophylaxis when appropriate and follow the patient postoperatively in the office setting to monitor progress. The possibility of recurrent pain, no improvement in pain and actual worsening of pain were also discussed with the patient. The discharge plan of care focused on the patient going home following surgery. The patient was encouraged to make the necessary arrangements to have someone stay with them when they are discharged home. Following discharge, a home care nurse was to the patient. The home care nurse would open the patients home care case and request home physical therapy services. Home physical therapy was to commence following discharge provided it was appropriate and covered by the health insurance benefit plan.  The benefits of surgery were discussed with the patient including the potential for improving her current clinical condition through operative intervention. Alternatives to surgical intervention including continued conservative management were also discussed in detail. All questions were answered to the satisfaction of the patient. The treatment plan of care, as well as a model of a total knee arthroplasty equivalent to the one that will be used for their total joint replacement, was shared with the patient. The patient agreed to the plan of care as well as the use of implants in their total joint replacement.

## 2024-09-03 NOTE — PROGRESS NOTE ADULT - ASSESSMENT
58 year old male with PMHx of Morbid obesity AISHA, Lamas's esophagus, HTN, Gout, L knee s/p TKR 11/22/23 presenting with R eye visual deficit in lower fields for several days, s/p outpatient ophthalmology eval showing b/l papilledema, sent to hospital for further management    Bilateral papilledema w/ R vision loss - Concern for idiopathic intracranial hypertension  CTH/CTA/CTP negative on 11/26   MRI brain/orbit negative  LP unsuccessful in ED, IR consulted for LP   Holding ASA and VTE prophylaxis for LP  -[id number]  -Neuro consulted   -IR to perform LP today    Acute Anemia  -Most likely due to recent L TKR  -HGB baseline (12.6/37.6 on 11/1/23 on [facility]IE)  -Denies hematochezia, melena, hematemesis   -Trend H/H   -Check T&S   -Blood consent obtained  irons studies. if low would add oral iron    B/L knee pain, s/p L TKR w/ edema  -c/w oxycodone q6h prn pain   -hold celebrex 200 mg BID, ASA 81 mg QD w/ pending LP   -Ortho consulted - no acute intervention    HTN  -olmesartan 40 mg QD --> losartan 100 mg QD   -Trend BP    Lamas; esophagus   -noted on [facility] GI progress note 11/4/21   -c/w famotidine 20 mg QD   -omeprazole 40 mg --> pantoprazole 40 mg QD     Urinary retention   -c/w tamsulosin .4 mg QD    Gout  -c/w allopurinol 300 mg QD    AISHA  -Currently not on CPAP  -Sees Dr. Askew outpatient        VTE prophylaxis  -SCD for now, hold lovenox w/ pending LP       Dispo: Acute     Yes

## 2024-09-20 NOTE — HISTORY OF PRESENT ILLNESS
[Never] : never [Witnessed Apnea During Sleep] : witnessed apnea during sleep [Excessive Daytime Sleepiness] : excessive daytime sleepiness [Snoring] : snoring [Unrefreshing Sleep] : unrefreshing sleep [Sleepy When Sedentary] : sleepy when sedentary [None] : No associated symptoms are reported [Fair Symptom Control] : fair symptom control [Initial Evaluation] : an initial evaluation of [Excess Weight] : excess weight [Currently Experiencing] : The patient is currently experiencing symptoms. [Dyspnea] : dyspnea [Knee Pain] : knee pain [Back Pain] : back pain [Low Calorie Diet] : low calorie diet [Fair Compliance] : fair compliance with treatment [Fair Tolerance] : fair tolerance of treatment [Poor Symptom Control] : poor symptom control [Dyslipidemia] : dyslipidemia [Sleep Apnea] : sleep apnea [Diabetes] : diabetes [Hypertension] : hypertension [High] : high [Low Calorie] : low calorie [Well Balanced Diet] : well balanced meals [Infrequently] : exercises infrequently Normal gait / station [TextBox_4] : Patient c/o SOBOE but is otherwise without associated respiratory complaints.\par He reports seasonal allergies.\par  [Witnessed Gasping During Sleep] : no witnessed gasping during sleep [Poor Compliance] : poor compliance with treatment [de-identified] : autoCPAP 4-8 cm of water

## 2024-09-21 ENCOUNTER — NON-APPOINTMENT (OUTPATIENT)
Age: 59
End: 2024-09-21

## 2024-09-25 ENCOUNTER — APPOINTMENT (OUTPATIENT)
Dept: ORTHOPEDIC SURGERY | Facility: CLINIC | Age: 59
End: 2024-09-25
Payer: MEDICARE

## 2024-09-25 VITALS
DIASTOLIC BLOOD PRESSURE: 83 MMHG | HEART RATE: 76 BPM | BODY MASS INDEX: 41.75 KG/M2 | WEIGHT: 315 LBS | HEIGHT: 73 IN | SYSTOLIC BLOOD PRESSURE: 137 MMHG

## 2024-09-25 DIAGNOSIS — E11.9 TYPE 2 DIABETES MELLITUS W/OUT COMPLICATIONS: ICD-10-CM

## 2024-09-25 DIAGNOSIS — M17.11 UNILATERAL PRIMARY OSTEOARTHRITIS, RIGHT KNEE: ICD-10-CM

## 2024-09-25 PROCEDURE — 99214 OFFICE O/P EST MOD 30 MIN: CPT | Mod: 25

## 2024-09-25 PROCEDURE — 20610 DRAIN/INJ JOINT/BURSA W/O US: CPT | Mod: RT

## 2024-09-25 NOTE — PROCEDURE
[de-identified] : I injected the patient's right knee today with cortisone for primary osteoarthritis.  I discussed at length with the patient the planned steroid and lidocaine injection. The risks, benefits, convalescence and alternatives were reviewed. The possible side effects discussed included but were not limited to: pain, swelling, heat, bleeding, and redness. Symptoms are generally mild but if they are extensive then contact the office. Giving pain relievers by mouth such as NSAIDs or Tylenol can generally treat the reactions to steroid and lidocaine. Rare cases of infection have been noted. Rash, hives and itching may occur post injection. If you have muscle pain or cramps, flushing and or swelling of the face, rapid heart beat, nausea, dizziness, fever, chills, headache, difficulty breathing, swelling in the arms or legs, or have a prickly feeling of your skin, contact a health care provider immediately. Following this discussion, the knee was prepped with Alcohol and under sterile condition the 80 mg Depo-Medrol and 6 cc Lidocaine injection was performed with a 20 gauge needle through a superolateral injection site. The needle was introduced into the joint, aspiration was performed to ensure intra-articular placement and the medication was injected. Upon withdrawal of the needle the site was cleaned with alcohol and a band aid applied. The patient tolerated the injection well and there were no adverse effects. Post injection instructions included no strenuous activity for 24 hours, cryotherapy and if there are any adverse effects to contact the office.

## 2024-09-25 NOTE — DISCUSSION/SUMMARY
[Medication Risks Reviewed] : Medication risks reviewed [Surgical risks reviewed] : Surgical risks reviewed [de-identified] : This is a 59 year old M pt presents today with bone-on-bone lateral compartmental osteoarthritis of the right knee. The nature of condition and treatment options were discussed in detail. Pt is a candidate for right TKA. The surgery was discussed in detail including pre-op and post-op. He was a chronic opioid user (oxycodone 30 mg) for back pain under the pain management and he hasn't been using it recently. Pt had history of sleep apnea but after he lost some weight it went away. He is not using CPAP or BiPAP. He denies a history of diabetes. He does have hypertension and takes the medication. The pt is having worse pain with walking, stairs, exercise, getting up from seated position. His quality of life is deteriorating. The pt has exhausted over 3 months of conservative treatment including cortisone injections, home therapy, PT, anti-inflammatories, Tylenol. I believe right TKA is reasonable. The pt opted for right knee cortisone injection which he tolerated well.  Pt understands that they cannot have surgery within 3 months of receiving an IA injection. Pt has scheduled to proceed right TKA in early January 2025. All questions and concerns were answered.  The patient has been counseled regarding the elevated risks associated with surgical complications in patients with a BMI> 35. The patient demonstrates an understanding of the increased risk. After a lengthy discussion, the patient agreed to make a coordinated effort at weight loss prior to surgical intervention. The patient understands our BMI policy and they will make a conscious effort to improve their BMI.  The patient is a 59 year old individual with end stage arthritis of their right knee joint. The patient has exhausted a minimum of 3 months conservative treatment including prior injections (cortisone and/or hyaluronic acid injections), physical therapy, over the counter NSAIDS and pain medication where indicated. In addition, the patient's quality of life is diminished due to significant chronic pain. The patient is having difficulty with activities of daily living, including ambulating, descending stairs, and rising from a seated position. Based upon the patients continued symptoms and failure to respond to conservative treatment, I have recommended a right total knee replacement for this patient. A long discussion took place with the patient describing what a total joint replacement is and what the procedure would entail. A knee model, similar to the implant that will be used during the operation, was utilized to demonstrate and to discuss the various bearing surfaces of the implants. Implant fixation, use of cement, was also discussed with the patient. Choices of implant manufacturers, mainly [employer] and [employer], were discussed and reviewed with preference to be made by patient and surgeon prior to operation. Final selection to be based on customary practice as well as preoperative templating with selection confirmed intraoperatively based on the patient's anatomy. The patient participated and agreed with the decision-making process. The hospitalization and post-operative care and rehabilitation were also discussed. The use of perioperative antibiotics and DVT prophylaxis were discussed. The risk, benefits and alternatives to a surgical intervention were discussed at length with the patient. The patient was also advised of risks related to the medical comorbidities and elevated body mass index (BMI). A lengthy discussion took place to review the most common complications including but not limited to: deep vein thrombosis, pulmonary embolism, heart attack, stroke, infection, wound breakdown, numbness, damage to nerves, tendon, muscles, arteries or other blood vessels, death and other possible complications from anesthesia. The patient was told that we will take steps to minimize these risks by using sterile technique, antibiotics and DVT prophylaxis when appropriate and follow the patient postoperatively in the office setting to monitor progress. The possibility of recurrent pain, no improvement in pain and actual worsening of pain were also discussed with the patient.   The discharge plan of care focused on the patient going home following surgery. The patient was encouraged to make the necessary arrangements to have someone stay with them when they are discharged home. Following discharge, a home care nurse will visit the patient. The home care nurse will open your home care case and request home physical therapy services. Home physical therapy will commence following discharge provided it is appropriate and covered by the health insurance benefit plan.   The benefits of surgery were discussed with the patient including the potential for improving his/her current clinical condition through operative intervention. Alternatives to surgical intervention including continued conservative management were also discussed in detail. All questions were answered to the satisfaction of the patient. The treatment plan of care, as well as a model of a total knee equivalent to the one that will be used for their total joint replacement, was shared with the patient. The patient participated and agreed to the plan of care as well as the use of the recommended implants for their total joint replacement surgery.

## 2024-09-25 NOTE — END OF VISIT
[FreeTextEntry3] : I, Gavin Serra, acted solely as a scribe for Dr. Valente Prater on this date 09/25/2024. I personally performed the services described in the documentation, reviewed the documentation recorded by the scribe in my presence, and it accurately and completely records my words and actions.

## 2024-09-25 NOTE — PHYSICAL EXAM
[Antalgic] : antalgic [de-identified] : GENERAL APPEARANCE: Well nourished and hydrated, pleasant, alert, and oriented x 3. Appears their stated age.  HEENT: Normocephalic, extraocular eye motion intact. Nasal septum midline. Oral cavity clear. External auditory canal clear.  RESPIRATORY: Breath sounds clear and audible in all lobes. No wheezing, No accessory muscle use. CARDIOVASCULAR: No apparent abnormalities. No lower leg edema. No varicosities. Pedal pulses are palpable. NEUROLOGIC: Sensation is normal, no muscle weakness in the upper or lower extremities. DERMATOLOGIC: No apparent skin lesions, moist, warm, no rash. SPINE: Cervical spine appears normal and moves freely; thoracic spine appears normal and moves freely; lumbosacral spine appears normal and moves freely, normal, nontender. MUSCULOSKELETAL: Hands, wrists, and elbows are normal and move freely, shoulders are normal and move freely.  Musculoskeletal 5/5 motor strength in bilateral lower extremities. Sensory: Intact in bilateral lower extremities. DTRs: Biceps, brachioradialis, triceps, patellar, ankle and plantar 2+ and symmetric bilaterally. Pulses: dorsalis pedis, posterior tibial, femoral, popliteal, and radial 2+ and symmetric bilaterally.  Constitutional: Alert and in no acute distress, but well-appearing.   [de-identified] : Right knee examination shows mild vlagus alignment range of motion is at 0 to 110 degree with pain No erythema no significant joint effusion

## 2024-09-25 NOTE — HISTORY OF PRESENT ILLNESS
[Pain Location] : pain [Worsening] : worsening [5] : a current pain level of 5/10 [de-identified] : This is a 59-year-old male with right knee pain he has end-stage knee osteoarthritis and complains of worsening pain and inability to walk any distance. he had cortisone injection which gave him good short-term relief in the past.  He was under the care of a pain management taking oxycodone 30 mg for back pain and knee pain and pt states he stopped taking pain medication is taking ibuprofen .  He is interested in right total knee arthroplasty. He underwent left total knee arthroplasty on November 21, 2023. He is doing very well with the left knee  pt had hx of pre DM Regards to his right knee , he has trouble with long standing walking and climbing stairs.   Patient requests repeat injection today.  He informs us that he has a eyesight issue.  And the ophthalmologist recommending elevated blood pressure during the anesthesia.  pt had history of sleep apnea but after he lost some weight it went away he is not using CPAP or BiPAP he denies a history of diabetes he does have hypertension and takes the medication  .

## 2024-11-07 ENCOUNTER — NON-APPOINTMENT (OUTPATIENT)
Age: 59
End: 2024-11-07

## 2024-12-27 ENCOUNTER — NON-APPOINTMENT (OUTPATIENT)
Age: 59
End: 2024-12-27

## 2024-12-30 ENCOUNTER — OUTPATIENT (OUTPATIENT)
Dept: OUTPATIENT SERVICES | Facility: HOSPITAL | Age: 59
LOS: 1 days | End: 2024-12-30
Payer: MEDICARE

## 2024-12-30 VITALS
DIASTOLIC BLOOD PRESSURE: 76 MMHG | HEIGHT: 74 IN | SYSTOLIC BLOOD PRESSURE: 102 MMHG | OXYGEN SATURATION: 95 % | HEART RATE: 73 BPM | WEIGHT: 315 LBS | RESPIRATION RATE: 18 BRPM | TEMPERATURE: 96 F

## 2024-12-30 DIAGNOSIS — Z01.818 ENCOUNTER FOR OTHER PREPROCEDURAL EXAMINATION: ICD-10-CM

## 2024-12-30 DIAGNOSIS — M17.11 UNILATERAL PRIMARY OSTEOARTHRITIS, RIGHT KNEE: ICD-10-CM

## 2024-12-30 DIAGNOSIS — Z96.652 PRESENCE OF LEFT ARTIFICIAL KNEE JOINT: Chronic | ICD-10-CM

## 2024-12-30 DIAGNOSIS — Z98.890 OTHER SPECIFIED POSTPROCEDURAL STATES: Chronic | ICD-10-CM

## 2024-12-30 LAB
A1C WITH ESTIMATED AVERAGE GLUCOSE RESULT: 7.8 % — HIGH (ref 4–5.6)
ALBUMIN SERPL ELPH-MCNC: 4.1 G/DL — SIGNIFICANT CHANGE UP (ref 3.3–5.2)
ALP SERPL-CCNC: 98 U/L — SIGNIFICANT CHANGE UP (ref 40–120)
ALT FLD-CCNC: 55 U/L — HIGH
ANION GAP SERPL CALC-SCNC: 13 MMOL/L — SIGNIFICANT CHANGE UP (ref 5–17)
APTT BLD: 31 SEC — SIGNIFICANT CHANGE UP (ref 24.5–35.6)
AST SERPL-CCNC: 49 U/L — HIGH
BASOPHILS # BLD AUTO: 0.07 K/UL — SIGNIFICANT CHANGE UP (ref 0–0.2)
BASOPHILS NFR BLD AUTO: 0.7 % — SIGNIFICANT CHANGE UP (ref 0–2)
BILIRUB SERPL-MCNC: 0.3 MG/DL — LOW (ref 0.4–2)
BLD GP AB SCN SERPL QL: SIGNIFICANT CHANGE UP
BUN SERPL-MCNC: 19.7 MG/DL — SIGNIFICANT CHANGE UP (ref 8–20)
CALCIUM SERPL-MCNC: 9.3 MG/DL — SIGNIFICANT CHANGE UP (ref 8.4–10.5)
CHLORIDE SERPL-SCNC: 102 MMOL/L — SIGNIFICANT CHANGE UP (ref 96–108)
CO2 SERPL-SCNC: 24 MMOL/L — SIGNIFICANT CHANGE UP (ref 22–29)
CREAT SERPL-MCNC: 0.99 MG/DL — SIGNIFICANT CHANGE UP (ref 0.5–1.3)
EGFR: 88 ML/MIN/1.73M2 — SIGNIFICANT CHANGE UP
EOSINOPHIL # BLD AUTO: 0.37 K/UL — SIGNIFICANT CHANGE UP (ref 0–0.5)
EOSINOPHIL NFR BLD AUTO: 4 % — SIGNIFICANT CHANGE UP (ref 0–6)
ESTIMATED AVERAGE GLUCOSE: 177 MG/DL — HIGH (ref 68–114)
GLUCOSE SERPL-MCNC: 117 MG/DL — HIGH (ref 70–99)
HCT VFR BLD CALC: 42.5 % — SIGNIFICANT CHANGE UP (ref 39–50)
HGB BLD-MCNC: 14.3 G/DL — SIGNIFICANT CHANGE UP (ref 13–17)
IMM GRANULOCYTES NFR BLD AUTO: 0.3 % — SIGNIFICANT CHANGE UP (ref 0–0.9)
INR BLD: 1.05 RATIO — SIGNIFICANT CHANGE UP (ref 0.85–1.16)
LYMPHOCYTES # BLD AUTO: 3.12 K/UL — SIGNIFICANT CHANGE UP (ref 1–3.3)
LYMPHOCYTES # BLD AUTO: 33.4 % — SIGNIFICANT CHANGE UP (ref 13–44)
MCHC RBC-ENTMCNC: 29.8 PG — SIGNIFICANT CHANGE UP (ref 27–34)
MCHC RBC-ENTMCNC: 33.6 G/DL — SIGNIFICANT CHANGE UP (ref 32–36)
MCV RBC AUTO: 88.5 FL — SIGNIFICANT CHANGE UP (ref 80–100)
MONOCYTES # BLD AUTO: 0.95 K/UL — HIGH (ref 0–0.9)
MONOCYTES NFR BLD AUTO: 10.2 % — SIGNIFICANT CHANGE UP (ref 2–14)
MRSA PCR RESULT.: SIGNIFICANT CHANGE UP
NEUTROPHILS # BLD AUTO: 4.8 K/UL — SIGNIFICANT CHANGE UP (ref 1.8–7.4)
NEUTROPHILS NFR BLD AUTO: 51.4 % — SIGNIFICANT CHANGE UP (ref 43–77)
PLATELET # BLD AUTO: 268 K/UL — SIGNIFICANT CHANGE UP (ref 150–400)
POTASSIUM SERPL-MCNC: 4.3 MMOL/L — SIGNIFICANT CHANGE UP (ref 3.5–5.3)
POTASSIUM SERPL-SCNC: 4.3 MMOL/L — SIGNIFICANT CHANGE UP (ref 3.5–5.3)
PROT SERPL-MCNC: 7.6 G/DL — SIGNIFICANT CHANGE UP (ref 6.6–8.7)
PROTHROM AB SERPL-ACNC: 11.9 SEC — SIGNIFICANT CHANGE UP (ref 9.9–13.4)
RBC # BLD: 4.8 M/UL — SIGNIFICANT CHANGE UP (ref 4.2–5.8)
RBC # FLD: 12.4 % — SIGNIFICANT CHANGE UP (ref 10.3–14.5)
S AUREUS DNA NOSE QL NAA+PROBE: DETECTED
SODIUM SERPL-SCNC: 139 MMOL/L — SIGNIFICANT CHANGE UP (ref 135–145)
WBC # BLD: 9.34 K/UL — SIGNIFICANT CHANGE UP (ref 3.8–10.5)
WBC # FLD AUTO: 9.34 K/UL — SIGNIFICANT CHANGE UP (ref 3.8–10.5)

## 2024-12-30 PROCEDURE — 36415 COLL VENOUS BLD VENIPUNCTURE: CPT

## 2024-12-30 PROCEDURE — 83036 HEMOGLOBIN GLYCOSYLATED A1C: CPT

## 2024-12-30 PROCEDURE — 87641 MR-STAPH DNA AMP PROBE: CPT

## 2024-12-30 PROCEDURE — 87640 STAPH A DNA AMP PROBE: CPT

## 2024-12-30 PROCEDURE — 85730 THROMBOPLASTIN TIME PARTIAL: CPT

## 2024-12-30 PROCEDURE — 86900 BLOOD TYPING SEROLOGIC ABO: CPT

## 2024-12-30 PROCEDURE — 86803 HEPATITIS C AB TEST: CPT

## 2024-12-30 PROCEDURE — 85025 COMPLETE CBC W/AUTO DIFF WBC: CPT

## 2024-12-30 PROCEDURE — 86850 RBC ANTIBODY SCREEN: CPT

## 2024-12-30 PROCEDURE — 80053 COMPREHEN METABOLIC PANEL: CPT

## 2024-12-30 PROCEDURE — G0463: CPT

## 2024-12-30 PROCEDURE — 85610 PROTHROMBIN TIME: CPT

## 2024-12-30 PROCEDURE — 93005 ELECTROCARDIOGRAM TRACING: CPT

## 2024-12-30 PROCEDURE — 93010 ELECTROCARDIOGRAM REPORT: CPT

## 2024-12-30 PROCEDURE — 86901 BLOOD TYPING SEROLOGIC RH(D): CPT

## 2024-12-30 RX ORDER — SODIUM CHLORIDE 9 MG/ML
3 INJECTION, SOLUTION INTRAMUSCULAR; INTRAVENOUS; SUBCUTANEOUS EVERY 8 HOURS
Refills: 0 | Status: DISCONTINUED | OUTPATIENT
Start: 2025-01-09 | End: 2025-01-10

## 2024-12-30 NOTE — H&P PST ADULT - PROBLEM SELECTOR PLAN 2
Surgical team to follow. Medical optimization pending. PCP for medical management and follow up as indicated.

## 2024-12-30 NOTE — H&P PST ADULT - ANESTHESIA, PREVIOUS REACTION, PROFILE
States post op 3 days after left knee replacement 11/2023 he lost vision in his right eye and developed "nyon."

## 2024-12-30 NOTE — H&P PST ADULT - TEMPERATURE IN FAHRENHEIT (DEGREES F)
You can access the FollowMyHealth Patient Portal offered by [facility] by registering at the following website: [email or web address]. By joining [facility]’s FollowMyHealth portal, you will also be able to view your health information using other applications (apps) compatible with our system. You can access the FollowMyHealth Patient Portal offered by [facility] by registering at the following website: [email or web address]. By joining [facility]’s FollowMyHealth portal, you will also be able to view your health information using other applications (apps) compatible with our system. 96.1

## 2024-12-30 NOTE — H&P PST ADULT - GASTROINTESTINAL
normal/soft/nontender/nondistended/normal active bowel sounds/no guarding/no rigidity/no organomegaly/no palpable arash/no masses palpable negative

## 2024-12-30 NOTE — H&P PST ADULT - ASSESSMENT
58 y/o male presents today to PST pending right knee total joint replacement on 25 with Dr. Valente Prater secondary to unilateral primary OA right knee.  States he has had the issue in his right knee for over the past year, states pain started to worsen in the last year. States pain is worse since getting his left knee replaced 2023 and starting to move more and now it bothers him severely. States pain is constant when standing or walking, no pain with sitting, rated 7/10, described as a stabbing pain, denies radiation of pain, states he got temporary relief from knee injections, denies recent falls, denies numbness in leg, denies cane/walker. History of HTN.  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  BMI  48.4.    Pt. educated and instructed regarding all preoperative instructions and education as per policy via both verbal and written means of communication and pt. verbalized agreement and understanding.  Patient was given information on joint class, joint book provided, ERP protocol reviewed with patient, MSSA/MRSA swabbed in PST, results pending and pt. verbalized agreement and understanding.  Pt. to have cardiac optimization with Dr. Madera, stats he has appt. and pt. verbalized agreement and understanding.  Pt. to have medical optimization with  Dr. Gray, states he has appt. and pt. verbalized agreement and understanding.  Pt. advised to hold ARB 24 hours prior to procedure and pt. verbalized agreement and understanding.   Pt. educated and instructed regarding all preoperative instructions and education as per policy via both verbal and written means of communication and pt. verbalized agreement and understanding.  Pt instructed to stop vitamins/supplements/herbal medications//NSAIDS for one week prior to surgery and pt. verbalized agreement and understanding.    OPIOID RISK TOOL    SHIRA EACH BOX THAT APPLIES AND ADD TOTALS AT THE END    FAMILY HISTORY OF SUBSTANCE ABUSE                 FEMALE         MALE                                                Alcohol                             [  ]1 pt          [  ]3pts                                               Illegal Durgs                     [  ]2 pts        [  ]3pts                                               Rx Drugs                           [  ]4 pts        [  ]4 pts    PERSONAL HISTORY OF SUBSTANCE ABUSE                                                                                          Alcohol                             [  ]3 pts       [  ]3 pts                                               Illegal Drugs                     [  ]4 pts        [  ]4 pts                                               Rx Drugs                           [  ]5 pts        [  ]5 pts    AGE BETWEEN 16-45 YEARS                                      [  ]1 pt         [  ]1 pt    HISTORY OF PREADOLESCENT   SEXUAL ABUSE                                                             [  ]3 pts        [  ]0pts    PSYCHOLOGICAL DISEASE                     ADD, OCD, Bipolar, Schizophrenia        [  ]2 pts         [  ]2 pts                      Depression                                               [  ]1 pt           [  ]1 pt           SCORING TOTAL   (add numbers and type here)              (0)                                     A score of 3 or lower indicated LOW risk for future opioid abuse  A score of 4 to 7 indicated moderate risk for future opioid abuse  A score of 8 or higher indicates a high risk for opioid abuse  CAPRINI SCORE    AGE RELATED RISK FACTORS                                                             [x ] Age 41-60 years                                            (1 Point)  [ ] Age: 61-74 years                                           (2 Points)                 [ ] Age= 75 years                                                (3 Points)             DISEASE RELATED RISK FACTORS                                                       [ ] Edema in the lower extremities                 (1 Point)                     [ ] Varicose veins                                               (1 Point)                                 [ x] BMI > 25 Kg/m2                                            (1 Point)                                  [ ] Serious infection (ie PNA)                            (1 Point)                     [ ] Lung disease ( COPD, Emphysema)            (1 Point)                                                                          [ ] Acute myocardial infarction                         (1 Point)                  [ ] Congestive heart failure (in the previous month)  (1 Point)         [ ] Inflammatory bowel disease                            (1 Point)                  [ ] Central venous access, PICC or Port               (2 points)       (within the last month)                                                                [ ] Stroke (in the previous month)                        (5 Points)    [ ] Previous or present malignancy                       (2 points)                                                                                                                                                         HEMATOLOGY RELATED FACTORS                                                         [ ] Prior episodes of VTE                                     (3 Points)                     [ ] Positive family history for VTE                      (3 Points)                  [ ] Prothrombin 70448 A                                     (3 Points)                     [ ] Factor V Leiden                                                (3 Points)                        [ ] Lupus anticoagulants                                      (3 Points)                                                           [ ] Anticardiolipin antibodies                              (3 Points)                                                       [ ] High homocysteine in the blood                   (3 Points)                                             [ ] Other congenital or acquired thrombophilia      (3 Points)                                                [ ] Heparin induced thrombocytopenia                  (3 Points)                                        MOBILITY RELATED FACTORS  [ ] Bed rest                                                         (1 Point)  [ ] Plaster cast                                                    (2 points)  [ ] Bed bound for more than 72 hours           (2 Points)    GENDER SPECIFIC FACTORS  [ ] Pregnancy or had a baby within the last month   (1 Point)  [ ] Post-partum < 6 weeks                                   (1 Point)  [ ] Hormonal therapy  or oral contraception   (1 Point)  [ ] History of pregnancy complications              (1 point)  [ ] Unexplained or recurrent              (1 Point)    OTHER RISK FACTORS                                           (1 Point)  [ ] BMI >40, smoking, diabetes requiring insulin, chemotherapy  blood transfusions and length of surgery over 2 hours    SURGERY RELATED RISK FACTORS  [ ]  Section within the last month     (1 Point)  [ ] Minor surgery                                                  (1 Point)  [ ] Arthroscopic surgery                                       (2 Points)  [ ] Planned major surgery lasting more            (2 Points)      than 45 minutes     [ x] Elective hip or knee joint replacement       (5 points)       surgery                                                TRAUMA RELATED RISK FACTORS  [ ] Fracture of the hip, pelvis, or leg                       (5 Points)  [ ] Spinal cord injury resulting in paralysis             (5 points)       (in the previous month)    [ ] Paralysis  (less than 1 month)                             (5 Points)  [ ] Multiple Trauma within 1 month                        (5 Points)    Total Score [  7      ]    Caprini Score 0-2: Low Risk, NO VTE prophylaxis required for most patients, encourage ambulation  Caprini Score 3-6: Moderate Risk , pharmacologic VTE prophylaxis is indicated for most patients (in the absence of contraindications)  Caprini Score Greater than or =7: High risk, pharmocologic VTE prophylaxis indicated for most patients (in the absence of contraindications)

## 2024-12-30 NOTE — H&P PST ADULT - EKG AND INTERPRETATION
EKG NSR with sinus arrythmia 71 bpm pending medical and cardiac optimization and final cardiac interpretation

## 2024-12-30 NOTE — H&P PST ADULT - PROBLEM SELECTOR PLAN 5
Medical and cardiac optimization pending for right knee total joint replacement on 1/9/25 with Dr. Valente Prater secondary to unilateral primary OA right knee.

## 2024-12-30 NOTE — H&P PST ADULT - CARDIOVASCULAR
negative normal/regular rate and rhythm/S1 S2 present/no gallops/no rub/no JVD/normal PMI/no pedal edema/murmur

## 2024-12-30 NOTE — H&P PST ADULT - HISTORY OF PRESENT ILLNESS
States he has had the issue in his right knee for over the past year, states pain started to worsen in the last year. States pain is worse since getting his left knee replaced 11/2023 and starting to move more and now it bothers him severely. States pain is constant when standing or walking, no pain with sitting, rated 7/10, described as a stabbing pain, denies radiation of pain, states he got temporary relief from knee injections, denies recent falls, denies numbness in leg, denies cane/walker. History of HTN.  States post op 3 days after left knee replacement 11/2023 he lost vision in his right eye and developed "nyon." 58 y/o male presents today to PST pending right knee total joint replacement on 1/9/25 with Dr. Valente Prater secondary to unilateral primary OA right knee.  States he has had the issue in his right knee for over the past year, states pain started to worsen in the last year. States pain is worse since getting his left knee replaced 11/2023 and starting to move more and now it bothers him severely. States pain is constant when standing or walking, no pain with sitting, rated 7/10, described as a stabbing pain, denies radiation of pain, states he got temporary relief from knee injections, denies recent falls, denies numbness in leg, denies cane/walker. History of HTN.  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

## 2024-12-30 NOTE — H&P PST ADULT - MUSCULOSKELETAL
normal/ROM intact/decreased ROM/normal gait/strength 5/5 bilateral upper extremities/strength 5/5 bilateral lower extremities details…

## 2024-12-30 NOTE — H&P PST ADULT - NSICDXPASTMEDICALHX_GEN_ALL_CORE_FT
PAST MEDICAL HISTORY:  Lamas esophagus     Gout     HTN (hypertension)     Obstructive sleep apnea      PAST MEDICAL HISTORY:  Lamas esophagus     Gout     Heart murmur     HTN (hypertension)     Morbid obesity     Non-arteritic anterior ischemic optic neuropathy     Obstructive sleep apnea     Unilateral primary osteoarthritis, right knee

## 2024-12-31 LAB
HCV AB S/CO SERPL IA: 0.1 S/CO — SIGNIFICANT CHANGE UP (ref 0–0.99)
HCV AB SERPL-IMP: SIGNIFICANT CHANGE UP

## 2024-12-31 RX ORDER — MUPIROCIN 2 %
1 OINTMENT (GRAM) TOPICAL
Qty: 1 | Refills: 0
Start: 2024-12-31 | End: 2025-01-04

## 2025-01-07 RX ORDER — POVIDONE IODINE USP, 10% W/W 10 MG/ML
1 SWAB TOPICAL ONCE
Refills: 0 | Status: COMPLETED | OUTPATIENT
Start: 2025-01-09 | End: 2025-01-09

## 2025-01-08 NOTE — PROVIDER CONTACT NOTE (OTHER) - SITUATION
Attended joint education session on 1/3/2024 via online method with opportunity to ask questions. Contact information for follow up questions given.

## 2025-01-09 ENCOUNTER — INPATIENT (INPATIENT)
Facility: HOSPITAL | Age: 60
LOS: 0 days | Discharge: HOME HEALTH | DRG: 470 | End: 2025-01-10
Attending: ORTHOPAEDIC SURGERY | Admitting: ORTHOPAEDIC SURGERY
Payer: MEDICARE

## 2025-01-09 ENCOUNTER — TRANSCRIPTION ENCOUNTER (OUTPATIENT)
Age: 60
End: 2025-01-09

## 2025-01-09 ENCOUNTER — APPOINTMENT (OUTPATIENT)
Dept: ORTHOPEDIC SURGERY | Facility: HOSPITAL | Age: 60
End: 2025-01-09

## 2025-01-09 VITALS
SYSTOLIC BLOOD PRESSURE: 149 MMHG | OXYGEN SATURATION: 98 % | WEIGHT: 315 LBS | HEART RATE: 72 BPM | RESPIRATION RATE: 16 BRPM | HEIGHT: 67.32 IN | DIASTOLIC BLOOD PRESSURE: 77 MMHG | TEMPERATURE: 98 F

## 2025-01-09 DIAGNOSIS — Z98.890 OTHER SPECIFIED POSTPROCEDURAL STATES: Chronic | ICD-10-CM

## 2025-01-09 DIAGNOSIS — Z96.652 PRESENCE OF LEFT ARTIFICIAL KNEE JOINT: Chronic | ICD-10-CM

## 2025-01-09 DIAGNOSIS — E66.01 MORBID (SEVERE) OBESITY DUE TO EXCESS CALORIES: ICD-10-CM

## 2025-01-09 DIAGNOSIS — I10 ESSENTIAL (PRIMARY) HYPERTENSION: ICD-10-CM

## 2025-01-09 DIAGNOSIS — G47.33 OBSTRUCTIVE SLEEP APNEA (ADULT) (PEDIATRIC): ICD-10-CM

## 2025-01-09 DIAGNOSIS — R01.1 CARDIAC MURMUR, UNSPECIFIED: ICD-10-CM

## 2025-01-09 DIAGNOSIS — Z29.9 ENCOUNTER FOR PROPHYLACTIC MEASURES, UNSPECIFIED: ICD-10-CM

## 2025-01-09 DIAGNOSIS — M17.11 UNILATERAL PRIMARY OSTEOARTHRITIS, RIGHT KNEE: ICD-10-CM

## 2025-01-09 LAB
GLUCOSE BLDC GLUCOMTR-MCNC: 121 MG/DL — HIGH (ref 70–99)
GLUCOSE BLDC GLUCOMTR-MCNC: 123 MG/DL — HIGH (ref 70–99)
GLUCOSE BLDC GLUCOMTR-MCNC: 132 MG/DL — HIGH (ref 70–99)
GLUCOSE BLDC GLUCOMTR-MCNC: 224 MG/DL — HIGH (ref 70–99)

## 2025-01-09 PROCEDURE — 27447 TOTAL KNEE ARTHROPLASTY: CPT | Mod: AS,RT

## 2025-01-09 PROCEDURE — 73560 X-RAY EXAM OF KNEE 1 OR 2: CPT | Mod: 26,RT

## 2025-01-09 PROCEDURE — 27447 TOTAL KNEE ARTHROPLASTY: CPT | Mod: RT

## 2025-01-09 PROCEDURE — 20985 CPTR-ASST DIR MS PX: CPT

## 2025-01-09 DEVICE — COMP FEM NON POROUS SZ 10 RT: Type: IMPLANTABLE DEVICE | Site: RIGHT | Status: FUNCTIONAL

## 2025-01-09 DEVICE — STEM MOD UNIV TIB 12X40MM: Type: IMPLANTABLE DEVICE | Site: RIGHT | Status: FUNCTIONAL

## 2025-01-09 DEVICE — GUIDE PIN/SCREW ORTHO 3.2 X 37MM: Type: IMPLANTABLE DEVICE | Site: RIGHT | Status: FUNCTIONAL

## 2025-01-09 DEVICE — IMPLANTABLE DEVICE: Type: IMPLANTABLE DEVICE | Site: RIGHT | Status: FUNCTIONAL

## 2025-01-09 DEVICE — GUIDE PIN SCREW ORTHO STR FLUTED: Type: IMPLANTABLE DEVICE | Site: RIGHT | Status: FUNCTIONAL

## 2025-01-09 DEVICE — CEMENT PALACOS R: Type: IMPLANTABLE DEVICE | Site: RIGHT | Status: FUNCTIONAL

## 2025-01-09 DEVICE — COMP PATELLA TRI-PEG E-PLUS POLY 9X38MM: Type: IMPLANTABLE DEVICE | Site: RIGHT | Status: FUNCTIONAL

## 2025-01-09 DEVICE — GUIDE PIN SCREW ORTHO THRD SQ HEADED: Type: IMPLANTABLE DEVICE | Site: RIGHT | Status: FUNCTIONAL

## 2025-01-09 DEVICE — INSERT TIB NONPOROUS UNIV SZ 10 RT: Type: IMPLANTABLE DEVICE | Site: RIGHT | Status: FUNCTIONAL

## 2025-01-09 RX ORDER — ONDANSETRON 4 MG/1
4 TABLET ORAL ONCE
Refills: 0 | Status: DISCONTINUED | OUTPATIENT
Start: 2025-01-09 | End: 2025-01-09

## 2025-01-09 RX ORDER — HYDROMORPHONE HCL 4 MG
4 TABLET ORAL
Refills: 0 | Status: DISCONTINUED | OUTPATIENT
Start: 2025-01-09 | End: 2025-01-10

## 2025-01-09 RX ORDER — CELECOXIB 200 MG
200 CAPSULE ORAL EVERY 12 HOURS
Refills: 0 | Status: DISCONTINUED | OUTPATIENT
Start: 2025-01-11 | End: 2025-01-10

## 2025-01-09 RX ORDER — ACETAMINOPHEN 80 MG/.8ML
975 SOLUTION/ DROPS ORAL ONCE
Refills: 0 | Status: COMPLETED | OUTPATIENT
Start: 2025-01-09 | End: 2025-01-09

## 2025-01-09 RX ORDER — MAGNESIUM HYDROXIDE 400 MG/5ML
30 SUSPENSION, ORAL (FINAL DOSE FORM) ORAL DAILY
Refills: 0 | Status: DISCONTINUED | OUTPATIENT
Start: 2025-01-09 | End: 2025-01-10

## 2025-01-09 RX ORDER — OXYCODONE HCL 15 MG
5 TABLET ORAL
Refills: 0 | Status: DISCONTINUED | OUTPATIENT
Start: 2025-01-09 | End: 2025-01-10

## 2025-01-09 RX ORDER — SENNOSIDES 8.6 MG/1
2 TABLET, FILM COATED ORAL AT BEDTIME
Refills: 0 | Status: DISCONTINUED | OUTPATIENT
Start: 2025-01-09 | End: 2025-01-10

## 2025-01-09 RX ORDER — SODIUM CHLORIDE 9 MG/ML
1000 INJECTION, SOLUTION INTRAMUSCULAR; INTRAVENOUS; SUBCUTANEOUS
Refills: 0 | Status: DISCONTINUED | OUTPATIENT
Start: 2025-01-09 | End: 2025-01-10

## 2025-01-09 RX ORDER — PANTOPRAZOLE 40 MG/1
40 TABLET, DELAYED RELEASE ORAL
Refills: 0 | Status: DISCONTINUED | OUTPATIENT
Start: 2025-01-09 | End: 2025-01-10

## 2025-01-09 RX ORDER — SODIUM CHLORIDE 9 MG/ML
500 INJECTION, SOLUTION INTRAMUSCULAR; INTRAVENOUS; SUBCUTANEOUS ONCE
Refills: 0 | Status: COMPLETED | OUTPATIENT
Start: 2025-01-09 | End: 2025-01-09

## 2025-01-09 RX ORDER — ACETAMINOPHEN 80 MG/.8ML
975 SOLUTION/ DROPS ORAL EVERY 8 HOURS
Refills: 0 | Status: DISCONTINUED | OUTPATIENT
Start: 2025-01-09 | End: 2025-01-10

## 2025-01-09 RX ORDER — BISACODYL 5 MG
10 TABLET, DELAYED RELEASE (ENTERIC COATED) ORAL ONCE
Refills: 0 | Status: DISCONTINUED | OUTPATIENT
Start: 2025-01-11 | End: 2025-01-10

## 2025-01-09 RX ORDER — CEFAZOLIN SODIUM 1 G
3000 VIAL (EA) INJECTION ONCE
Refills: 0 | Status: DISCONTINUED | OUTPATIENT
Start: 2025-01-09 | End: 2025-01-09

## 2025-01-09 RX ORDER — ASPIRIN 81 MG
81 TABLET, DELAYED RELEASE (ENTERIC COATED) ORAL
Refills: 0 | Status: DISCONTINUED | OUTPATIENT
Start: 2025-01-09 | End: 2025-01-10

## 2025-01-09 RX ORDER — OXYCODONE HCL 15 MG
10 TABLET ORAL
Refills: 0 | Status: DISCONTINUED | OUTPATIENT
Start: 2025-01-09 | End: 2025-01-10

## 2025-01-09 RX ORDER — ACETAMINOPHEN 80 MG/.8ML
1000 SOLUTION/ DROPS ORAL ONCE
Refills: 0 | Status: COMPLETED | OUTPATIENT
Start: 2025-01-09 | End: 2025-01-10

## 2025-01-09 RX ORDER — ONDANSETRON 4 MG/1
4 TABLET ORAL EVERY 6 HOURS
Refills: 0 | Status: DISCONTINUED | OUTPATIENT
Start: 2025-01-09 | End: 2025-01-10

## 2025-01-09 RX ORDER — OLMESARTAN MEDOXOMIL 40 MG/1
1 TABLET, FILM COATED ORAL
Refills: 0 | DISCHARGE

## 2025-01-09 RX ORDER — POLYETHYLENE GLYCOL 3350 17 G/DOSE
17 POWDER (GRAM) ORAL AT BEDTIME
Refills: 0 | Status: DISCONTINUED | OUTPATIENT
Start: 2025-01-09 | End: 2025-01-10

## 2025-01-09 RX ORDER — SODIUM CHLORIDE 9 MG/ML
1000 INJECTION, SOLUTION INTRAVENOUS
Refills: 0 | Status: DISCONTINUED | OUTPATIENT
Start: 2025-01-09 | End: 2025-01-09

## 2025-01-09 RX ORDER — ACETAMINOPHEN 80 MG/.8ML
1000 SOLUTION/ DROPS ORAL ONCE
Refills: 0 | Status: COMPLETED | OUTPATIENT
Start: 2025-01-09 | End: 2025-01-09

## 2025-01-09 RX ORDER — CEFAZOLIN SODIUM 1 G
2000 VIAL (EA) INJECTION
Refills: 0 | Status: COMPLETED | OUTPATIENT
Start: 2025-01-09 | End: 2025-01-10

## 2025-01-09 RX ORDER — FENTANYL 75 UG/H
50 PATCH, EXTENDED RELEASE TRANSDERMAL
Refills: 0 | Status: DISCONTINUED | OUTPATIENT
Start: 2025-01-09 | End: 2025-01-09

## 2025-01-09 RX ORDER — APREPITANT 40 MG/1
40 CAPSULE ORAL ONCE
Refills: 0 | Status: COMPLETED | OUTPATIENT
Start: 2025-01-09 | End: 2025-01-09

## 2025-01-09 RX ORDER — B COMPLEX, C NO.20/FOLIC ACID 1 MG
1 CAPSULE ORAL DAILY
Refills: 0 | Status: DISCONTINUED | OUTPATIENT
Start: 2025-01-09 | End: 2025-01-10

## 2025-01-09 RX ORDER — TRANEXAMIC ACID 650 MG/1
1000 TABLET ORAL ONCE
Refills: 0 | Status: DISCONTINUED | OUTPATIENT
Start: 2025-01-09 | End: 2025-01-09

## 2025-01-09 RX ORDER — LOSARTAN POTASSIUM 100 MG/1
100 TABLET, FILM COATED ORAL DAILY
Refills: 0 | Status: DISCONTINUED | OUTPATIENT
Start: 2025-01-11 | End: 2025-01-10

## 2025-01-09 RX ORDER — KETOROLAC TROMETHAMINE 30 MG/ML
15 INJECTION INTRAMUSCULAR; INTRAVENOUS EVERY 6 HOURS
Refills: 0 | Status: COMPLETED | OUTPATIENT
Start: 2025-01-09 | End: 2025-01-10

## 2025-01-09 RX ADMIN — FENTANYL 50 MICROGRAM(S): 75 PATCH, EXTENDED RELEASE TRANSDERMAL at 18:15

## 2025-01-09 RX ADMIN — FENTANYL 50 MICROGRAM(S): 75 PATCH, EXTENDED RELEASE TRANSDERMAL at 19:07

## 2025-01-09 RX ADMIN — Medication 10 MILLIGRAM(S): at 22:18

## 2025-01-09 RX ADMIN — ACETAMINOPHEN 400 MILLIGRAM(S): 80 SOLUTION/ DROPS ORAL at 18:33

## 2025-01-09 RX ADMIN — Medication 81 MILLIGRAM(S): at 18:48

## 2025-01-09 RX ADMIN — FENTANYL 50 MICROGRAM(S): 75 PATCH, EXTENDED RELEASE TRANSDERMAL at 19:02

## 2025-01-09 RX ADMIN — FENTANYL 50 MICROGRAM(S): 75 PATCH, EXTENDED RELEASE TRANSDERMAL at 18:38

## 2025-01-09 RX ADMIN — ACETAMINOPHEN 1000 MILLIGRAM(S): 80 SOLUTION/ DROPS ORAL at 18:53

## 2025-01-09 RX ADMIN — POVIDONE IODINE USP, 10% W/W 1 APPLICATION(S): 10 SWAB TOPICAL at 13:06

## 2025-01-09 RX ADMIN — Medication 10 MILLIGRAM(S): at 23:18

## 2025-01-09 RX ADMIN — ACETAMINOPHEN 975 MILLIGRAM(S): 80 SOLUTION/ DROPS ORAL at 13:01

## 2025-01-09 RX ADMIN — APREPITANT 40 MILLIGRAM(S): 40 CAPSULE ORAL at 13:01

## 2025-01-09 RX ADMIN — KETOROLAC TROMETHAMINE 15 MILLIGRAM(S): 30 INJECTION INTRAMUSCULAR; INTRAVENOUS at 23:52

## 2025-01-09 RX ADMIN — SODIUM CHLORIDE 500 MILLILITER(S): 9 INJECTION, SOLUTION INTRAMUSCULAR; INTRAVENOUS; SUBCUTANEOUS at 18:15

## 2025-01-09 RX ADMIN — KETOROLAC TROMETHAMINE 15 MILLIGRAM(S): 30 INJECTION INTRAMUSCULAR; INTRAVENOUS at 23:41

## 2025-01-09 RX ADMIN — SODIUM CHLORIDE 3 MILLILITER(S): 9 INJECTION, SOLUTION INTRAMUSCULAR; INTRAVENOUS; SUBCUTANEOUS at 22:06

## 2025-01-09 RX ADMIN — FENTANYL 50 MICROGRAM(S): 75 PATCH, EXTENDED RELEASE TRANSDERMAL at 18:20

## 2025-01-09 RX ADMIN — FENTANYL 50 MICROGRAM(S): 75 PATCH, EXTENDED RELEASE TRANSDERMAL at 18:33

## 2025-01-09 RX ADMIN — Medication 2000 MILLIGRAM(S): at 23:41

## 2025-01-09 NOTE — PHYSICAL THERAPY INITIAL EVALUATION ADULT - GENERAL OBSERVATIONS, REHAB EVAL
Pt received in stretcher in PACU + IV + tele//BP monitoring + VCBs, breathing on RA in NAD, in 8/10 right knee pain, agreeable to PT evaluation

## 2025-01-09 NOTE — PHYSICAL THERAPY INITIAL EVALUATION ADULT - LEVEL OF INDEPENDENCE: GAIT, REHAB EVAL
[FreeTextEntry1] : 38 yo complains of vaginal discharge x10 days. Pt reports that she took boric acid last week, still reports discharge no odor.  contact guard

## 2025-01-09 NOTE — DISCHARGE NOTE PROVIDER - NSDCMRMEDTOKEN_GEN_ALL_CORE_FT
Benicar 40 mg oral tablet: 1 tab(s) orally once a day  mupirocin 2% topical ointment: 1 application in each nostril 2 times a day apply to each nostril twice daily for 5 days. start 01/04/2025   acetaminophen 325 mg oral tablet: 3 tab(s) orally every 8 hours  Aspirin EC 81 mg oral delayed release tablet: 1 tab(s) orally 2 times a day  Benicar 40 mg oral tablet: 1 tab(s) orally once a day  celecoxib 200 mg oral capsule: 1 cap(s) orally every 12 hours  Narcan 4 mg/0.1 mL nasal spray: 1 spray(s) intranasally once as needed for oversedation  oxyCODONE 5 mg oral tablet: 1 tab(s) orally every 6 hours as needed for pain MDD: 4  pantoprazole 40 mg oral delayed release tablet: 1 tab(s) orally once a day (before a meal)  Senna S 50 mg-8.6 mg oral tablet: 2 tab(s) orally once a day   acetaminophen 325 mg oral tablet: 3 tab(s) orally every 8 hours  Aspirin EC 81 mg oral delayed release tablet: 1 tab(s) orally 2 times a day  Benicar 40 mg oral tablet: 1 tab(s) orally once a day  celecoxib 200 mg oral capsule: 1 cap(s) orally every 12 hours  metFORMIN 500 mg oral tablet: 1 tab(s) orally 2 times a day follow-up with Primary care physician 1-2 weeks  Narcan 4 mg/0.1 mL nasal spray: 1 spray(s) intranasally once as needed for oversedation  oxyCODONE 5 mg oral tablet: 1 tab(s) orally every 6 hours as needed for pain MDD: 4  pantoprazole 40 mg oral delayed release tablet: 1 tab(s) orally once a day (before a meal)  Senna S 50 mg-8.6 mg oral tablet: 2 tab(s) orally once a day

## 2025-01-09 NOTE — DISCHARGE NOTE PROVIDER - HOSPITAL COURSE
The patient underwent a RIGHT TOTAL KNEE REPLACEMENT on 1/9/25. The patient received antibiotics consistent with SCIP guidelines. The patient underwent the procedure and had no intra-operative complications. Post-operatively, the patient was seen by medicine and PT. The patient received ASPIRIN for DVTP. The patient received pain medications per orthopedic pain management pathway and the pain was appropriately controlled. The patient did not have any post-operative medical complications. The patient was discharged in stable condition.

## 2025-01-09 NOTE — DISCHARGE NOTE PROVIDER - NSDCFUSCHEDAPPT_GEN_ALL_CORE_FT
[facility]  ORTHOSURG Alley CERRATO  Scheduled Appointment: 01/30/2025    Val[facility] Prater  [facility]  Scheduled Appointment: 03/24/2025

## 2025-01-09 NOTE — DISCHARGE NOTE PROVIDER - CARE PROVIDER_API CALL
Valente Prater  Joint Reconstruction  [address] [place] [address]  Phone: [phone]  Fax: [phone]  Follow Up Time:

## 2025-01-09 NOTE — PHYSICAL THERAPY INITIAL EVALUATION ADULT - ADDITIONAL COMMENTS
pt reports living in private home with wife and daughter, wife will be home to assist. Pt has 3 FELICITA with handrail and 6 + 6 stairs inside with handrail however plans to remain on the first floor for a while. Pt owns RW, cane, and tub bench,.

## 2025-01-09 NOTE — DISCHARGE NOTE PROVIDER - NSDCFUADDAPPT_GEN_ALL_CORE_FT
follow-up with Primary care physician 1-2 weeks follow-up with Primary care physician 1-2 weeks regarding elevated blood glucose.

## 2025-01-09 NOTE — PHYSICAL THERAPY INITIAL EVALUATION ADULT - FUNCTIONAL LIMITATIONS, PT EVAL
self-care/home management/community/leisure
Pt is a 1 day old female born to a 24yo  female, HIV negative, RPR NR, Hep B negative, Rubella Immune, GBS positive, COVID 19 neg. Mother PCN allergic and treated with vancomycin. Pt was macrosomic and there was shoulder dystocia at time of delivery of the right shoulder. Code 100 called. (see delivery room note). Apgars 7/9. BW 4500g. Pt admitted to NICU on CPAP for continued grunting. Upon arrival to NICU, grunting resolved and was weaned to RA. CXR obtained which did not show any evidence of fracture of clavicles. Pt observed for 4hrs under NICU status on RA and then downgraded to observation nursery for the remainder of the first 24hrs due to history of vancomycin for GBS. Blood sugars stable. On exam, no creptius palpated, symmetric srinath. Pt moves both arms, but initially did not move right shoulder as much as left, but improved over the next few hours.     General: Alert, awake, responds to touch, pink  HEENT: AFOF, no cleft lip or palate, red reflexes intact  Chest: no increased work of breathing, CTA b/l, equal air entry  Cardio: RRR, no murmur, pulses equal b/l, cap refill <2sec  Abdomen: 3 vessel cord, soft, nondistended, no palpable masses  : normal genitalia  Anus: appears patent  Neuro:  reflexes intact, tone appropriate for gestational age, no sacral dimple  Extremities: FROM all 4 extremities equally, 10 fingers, 10 toes    1 day old female born at 39 weeks with history of shoulder dystocia, LGA with normal d-sticks, and resolved TTN    Respiratory: RA  CVS: Hemodynamically Stable  FENGi: ad iqra Ksim  Heme:   Bilirubin: pending  ID: observation nursery for maternal GBS treated with vancomycin  Neuro: symmetric srinath, no evidence of Erbs palsy  Meds: none  Lines: none   Screen: pending collection    Plan:  - Pt downgraded to observation nursery

## 2025-01-09 NOTE — DISCHARGE NOTE PROVIDER - NSDCFUADDINST_GEN_ALL_CORE_FT
The patient will be seen in the office between 2-3 weeks for wound check.   **Your first post-operative visit has been scheduled prior to your admission. PLEASE CONTACT OFFICE TO CONFIRM THE APPOINTMENT DATE. Sutures/Staples/Tape will be removed at that time.  **  The silver based dressing is to be removed 7 days from the date of surgery.   ** CONTACT THE OFFICE IF THE FOLLOWING DEVELOP:  - the dressing becomes soiled or saturated  - you develop a fever greater that 101F  - the wound becomes red or you develop blistering around the wound  * Patient may shower after post-op day #3.   * The patient will continue home PT consistent with  total knee replacement protocol. Transition to outpatient PT will occur at the time of the first office visit.   * The patient will practice knee extension exercises regularly to minimize hamstring contraction.   * The patient is FULL weight bearing.  * The patient will continue ASPIRIN 81mg twice daily for 6 weeks after surgery for blood clot prevention.  * While on aspirin, the patient will take daily omeprazole or other similar medication to protect the stomach from irritation.   * The patient will take OXYCODONE for pain control and adjust according to prescription and patient needs.  The patient will take TYLENOL 975mg every 8 hours as needed for pain control. Contact the office if pain increases while taking prescribed pain medications or related concerns develop.  * Celebrex will be taken twice daily for 3 weeks for pain control and prevention of excessive bone growth. Additional prescription may be requested at your office follow-up visit.   * The patient will take Senna S while taking oxycodone to prevent narcotic associated constipation.  Additionally, increase water intake (drink at least 8 glasses of water daily) and try adding fiber to the diet by eating fruits, vegetables and foods that are rich in grains. If constipation is experienced, contact the medical/primary care provider to discuss further treatment options.  * To avoid injury at home:  - continue use of rolling walker until cleared by physical therapist  - have family or friend remove all throw rug or objects in hallways that may present a trip hazard.  - if you experience any dizziness or medical concerns, call your medical doctor or  911.  * The implant may activate metal detection devices.    You were found to have Hyperglycemia during your stay.  Your diabetes may need closer control and improvement.  Please follow up with your PMD/Endocrinologist upon discharge.  Please follow up [facility] Endocrinology upon discharge.

## 2025-01-10 ENCOUNTER — TRANSCRIPTION ENCOUNTER (OUTPATIENT)
Age: 60
End: 2025-01-10

## 2025-01-10 VITALS
SYSTOLIC BLOOD PRESSURE: 143 MMHG | OXYGEN SATURATION: 99 % | HEART RATE: 96 BPM | RESPIRATION RATE: 18 BRPM | DIASTOLIC BLOOD PRESSURE: 85 MMHG | TEMPERATURE: 98 F

## 2025-01-10 LAB
ANION GAP SERPL CALC-SCNC: 9 MMOL/L — SIGNIFICANT CHANGE UP (ref 5–17)
BUN SERPL-MCNC: 25.5 MG/DL — HIGH (ref 8–20)
CALCIUM SERPL-MCNC: 8.4 MG/DL — SIGNIFICANT CHANGE UP (ref 8.4–10.5)
CHLORIDE SERPL-SCNC: 100 MMOL/L — SIGNIFICANT CHANGE UP (ref 96–108)
CO2 SERPL-SCNC: 25 MMOL/L — SIGNIFICANT CHANGE UP (ref 22–29)
CREAT SERPL-MCNC: 1.1 MG/DL — SIGNIFICANT CHANGE UP (ref 0.5–1.3)
EGFR: 77 ML/MIN/1.73M2 — SIGNIFICANT CHANGE UP
GLUCOSE BLDC GLUCOMTR-MCNC: 220 MG/DL — HIGH (ref 70–99)
GLUCOSE SERPL-MCNC: 291 MG/DL — HIGH (ref 70–99)
HCT VFR BLD CALC: 32.4 % — LOW (ref 39–50)
HGB BLD-MCNC: 10.7 G/DL — LOW (ref 13–17)
MCHC RBC-ENTMCNC: 30.1 PG — SIGNIFICANT CHANGE UP (ref 27–34)
MCHC RBC-ENTMCNC: 33 G/DL — SIGNIFICANT CHANGE UP (ref 32–36)
MCV RBC AUTO: 91 FL — SIGNIFICANT CHANGE UP (ref 80–100)
PLATELET # BLD AUTO: 210 K/UL — SIGNIFICANT CHANGE UP (ref 150–400)
POTASSIUM SERPL-MCNC: 4.9 MMOL/L — SIGNIFICANT CHANGE UP (ref 3.5–5.3)
POTASSIUM SERPL-SCNC: 4.9 MMOL/L — SIGNIFICANT CHANGE UP (ref 3.5–5.3)
RBC # BLD: 3.56 M/UL — LOW (ref 4.2–5.8)
RBC # FLD: 12.4 % — SIGNIFICANT CHANGE UP (ref 10.3–14.5)
SODIUM SERPL-SCNC: 134 MMOL/L — LOW (ref 135–145)
WBC # BLD: 13.68 K/UL — HIGH (ref 3.8–10.5)
WBC # FLD AUTO: 13.68 K/UL — HIGH (ref 3.8–10.5)

## 2025-01-10 PROCEDURE — C1776: CPT

## 2025-01-10 PROCEDURE — 36415 COLL VENOUS BLD VENIPUNCTURE: CPT

## 2025-01-10 PROCEDURE — 82962 GLUCOSE BLOOD TEST: CPT

## 2025-01-10 PROCEDURE — 80048 BASIC METABOLIC PNL TOTAL CA: CPT

## 2025-01-10 PROCEDURE — 99223 1ST HOSP IP/OBS HIGH 75: CPT

## 2025-01-10 PROCEDURE — 73560 X-RAY EXAM OF KNEE 1 OR 2: CPT

## 2025-01-10 PROCEDURE — 85027 COMPLETE CBC AUTOMATED: CPT

## 2025-01-10 PROCEDURE — 97116 GAIT TRAINING THERAPY: CPT

## 2025-01-10 PROCEDURE — C1713: CPT

## 2025-01-10 PROCEDURE — 97110 THERAPEUTIC EXERCISES: CPT

## 2025-01-10 PROCEDURE — 27447 TOTAL KNEE ARTHROPLASTY: CPT

## 2025-01-10 RX ORDER — INSULIN LISPRO 100/ML
VIAL (ML) SUBCUTANEOUS
Refills: 0 | Status: DISCONTINUED | OUTPATIENT
Start: 2025-01-10 | End: 2025-01-10

## 2025-01-10 RX ORDER — DEXTROSE MONOHYDRATE 25 G/50ML
25 INJECTION, SOLUTION INTRAVENOUS ONCE
Refills: 0 | Status: DISCONTINUED | OUTPATIENT
Start: 2025-01-10 | End: 2025-01-10

## 2025-01-10 RX ORDER — INSULIN LISPRO 100/ML
4 VIAL (ML) SUBCUTANEOUS ONCE
Refills: 0 | Status: COMPLETED | OUTPATIENT
Start: 2025-01-10 | End: 2025-01-10

## 2025-01-10 RX ORDER — NALOXONE HCL 0.4 MG/ML
1 VIAL (ML) INJECTION
Qty: 1 | Refills: 0
Start: 2025-01-10

## 2025-01-10 RX ORDER — DEXTROSE MONOHYDRATE 25 G/50ML
15 INJECTION, SOLUTION INTRAVENOUS ONCE
Refills: 0 | Status: DISCONTINUED | OUTPATIENT
Start: 2025-01-10 | End: 2025-01-10

## 2025-01-10 RX ORDER — DEXTROSE MONOHYDRATE 25 G/50ML
12.5 INJECTION, SOLUTION INTRAVENOUS ONCE
Refills: 0 | Status: DISCONTINUED | OUTPATIENT
Start: 2025-01-10 | End: 2025-01-10

## 2025-01-10 RX ORDER — GLUCAGON INJECTION, SOLUTION 0.5 MG/.1ML
1 INJECTION, SOLUTION SUBCUTANEOUS ONCE
Refills: 0 | Status: DISCONTINUED | OUTPATIENT
Start: 2025-01-10 | End: 2025-01-10

## 2025-01-10 RX ORDER — INFLUENZA A VIRUS A/WISCONSIN/588/2019 (H1N1) RECOMBINANT HEMAGGLUTININ ANTIGEN, INFLUENZA A VIRUS A/DARWIN/6/2021 (H3N2) RECOMBINANT HEMAGGLUTININ ANTIGEN, INFLUENZA B VIRUS B/AUSTRIA/1359417/2021 RECOMBINANT HEMAGGLUTININ ANTIGEN, AND INFLUENZA B VIRUS B/PHUKET/3073/2013 RECOMBINANT HEMAGGLUTININ ANTIGEN 45; 45; 45; 45 UG/.5ML; UG/.5ML; UG/.5ML; UG/.5ML
0.5 INJECTION INTRAMUSCULAR ONCE
Refills: 0 | Status: DISCONTINUED | OUTPATIENT
Start: 2025-01-10 | End: 2025-01-10

## 2025-01-10 RX ORDER — ASPIRIN 81 MG
1 TABLET, DELAYED RELEASE (ENTERIC COATED) ORAL
Qty: 84 | Refills: 0
Start: 2025-01-10

## 2025-01-10 RX ORDER — OXYCODONE HCL 15 MG
1 TABLET ORAL
Qty: 28 | Refills: 0
Start: 2025-01-10

## 2025-01-10 RX ORDER — SENNOSIDES AND DOCUSATE SODIUM 50; 8.6 MG/1; MG/1
2 TABLET ORAL
Qty: 30 | Refills: 0
Start: 2025-01-10

## 2025-01-10 RX ORDER — SODIUM CHLORIDE 9 MG/ML
1000 INJECTION, SOLUTION INTRAVENOUS
Refills: 0 | Status: DISCONTINUED | OUTPATIENT
Start: 2025-01-10 | End: 2025-01-10

## 2025-01-10 RX ORDER — METFORMIN 850 MG/1
1 TABLET ORAL
Qty: 60 | Refills: 0
Start: 2025-01-10 | End: 2025-02-08

## 2025-01-10 RX ORDER — ACETAMINOPHEN 80 MG/.8ML
3 SOLUTION/ DROPS ORAL
Qty: 0 | Refills: 0 | DISCHARGE
Start: 2025-01-10

## 2025-01-10 RX ORDER — INSULIN LISPRO 100/ML
VIAL (ML) SUBCUTANEOUS AT BEDTIME
Refills: 0 | Status: DISCONTINUED | OUTPATIENT
Start: 2025-01-10 | End: 2025-01-10

## 2025-01-10 RX ORDER — PANTOPRAZOLE 40 MG/1
1 TABLET, DELAYED RELEASE ORAL
Qty: 30 | Refills: 2
Start: 2025-01-10

## 2025-01-10 RX ADMIN — KETOROLAC TROMETHAMINE 15 MILLIGRAM(S): 30 INJECTION INTRAMUSCULAR; INTRAVENOUS at 05:38

## 2025-01-10 RX ADMIN — Medication 10 MILLIGRAM(S): at 02:40

## 2025-01-10 RX ADMIN — ACETAMINOPHEN 1000 MILLIGRAM(S): 80 SOLUTION/ DROPS ORAL at 05:38

## 2025-01-10 RX ADMIN — ACETAMINOPHEN 400 MILLIGRAM(S): 80 SOLUTION/ DROPS ORAL at 05:33

## 2025-01-10 RX ADMIN — Medication 4 UNIT(S): at 09:11

## 2025-01-10 RX ADMIN — Medication 10 MILLIGRAM(S): at 07:12

## 2025-01-10 RX ADMIN — Medication 81 MILLIGRAM(S): at 06:05

## 2025-01-10 RX ADMIN — Medication 10 MILLIGRAM(S): at 09:15

## 2025-01-10 RX ADMIN — Medication 10 MILLIGRAM(S): at 10:00

## 2025-01-10 RX ADMIN — SODIUM CHLORIDE 3 MILLILITER(S): 9 INJECTION, SOLUTION INTRAMUSCULAR; INTRAVENOUS; SUBCUTANEOUS at 05:28

## 2025-01-10 RX ADMIN — Medication 10 MILLIGRAM(S): at 03:40

## 2025-01-10 RX ADMIN — Medication 2000 MILLIGRAM(S): at 06:07

## 2025-01-10 RX ADMIN — Medication 10 MILLIGRAM(S): at 06:12

## 2025-01-10 RX ADMIN — PANTOPRAZOLE 40 MILLIGRAM(S): 40 TABLET, DELAYED RELEASE ORAL at 05:34

## 2025-01-10 RX ADMIN — KETOROLAC TROMETHAMINE 15 MILLIGRAM(S): 30 INJECTION INTRAMUSCULAR; INTRAVENOUS at 05:34

## 2025-01-10 RX ADMIN — ACETAMINOPHEN 975 MILLIGRAM(S): 80 SOLUTION/ DROPS ORAL at 13:12

## 2025-01-10 RX ADMIN — ACETAMINOPHEN 975 MILLIGRAM(S): 80 SOLUTION/ DROPS ORAL at 13:09

## 2025-01-10 NOTE — CONSULT NOTE ADULT - PROBLEM SELECTOR RECOMMENDATION 9
S/P right knee replacement  Post op abx as per SCIP  DVT ppx/Pain control/PT as per Ortho  IVF until taking po well  Incentive spirometer S/P right knee replacement  Post op abx as per SCIP  DVT ppx/Pain control/PT as per Ortho  IVF until taking po well  Incentive spirometer  EBL: 50 ml, asymptomatic

## 2025-01-10 NOTE — PATIENT PROFILE ADULT - CHOOSE INDICATION TO IMMUNIZE (AN ORDER WILL BE GENERATED WHEN THIS NOTE IS SAVED):
DISPLAY PLAN FREE TEXT DISPLAY PLAN FREE TEXT DISPLAY PLAN FREE TEXT DISPLAY PLAN FREE TEXT DISPLAY PLAN FREE TEXT DISPLAY PLAN FREE TEXT DISPLAY PLAN FREE TEXT DISPLAY PLAN FREE TEXT DISPLAY PLAN FREE TEXT DISPLAY PLAN FREE TEXT DISPLAY PLAN FREE TEXT DISPLAY PLAN FREE TEXT DISPLAY PLAN FREE TEXT DISPLAY PLAN FREE TEXT DISPLAY PLAN FREE TEXT DISPLAY PLAN FREE TEXT DISPLAY PLAN FREE TEXT DISPLAY PLAN FREE TEXT DISPLAY PLAN FREE TEXT Patient is not pregnant (male or female)

## 2025-01-10 NOTE — PROGRESS NOTE ADULT - SUBJECTIVE AND OBJECTIVE BOX
Patient seen and examined at bedside. comfortable in bed, pain controlled. Denies fever/chills, SOB/chest pain, abdominal pain, numbness/tingling. no complaints at this time.    Vital Signs Last 24 Hrs  T(C): 36.9 (09 Jan 2025 12:35), Max: 36.9 (09 Jan 2025 12:35)  T(F): 98.4 (09 Jan 2025 12:35), Max: 98.4 (09 Jan 2025 12:35)  HR: 72 (09 Jan 2025 12:35) (72 - 72)  BP: 149/77 (09 Jan 2025 12:35) (149/77 - 149/77)  BP(mean): --  RR: 16 (09 Jan 2025 12:35) (16 - 16)  SpO2: 98% (09 Jan 2025 12:35) (98% - 98%)    Parameters below as of 09 Jan 2025 12:35  Patient On (Oxygen Delivery Method): room air    General: NAD  RLE: Dressing C/D/I. SILT. + dorsi/plantarflexion. + EHL/FHL. DP 2+. calf soft NT B/L.    A/P: 59 y.o M s/p right TKA POD #0  - WBAT  - DVTP  - pain control
LEONIDAS DALE    [id number]    History: 59y Male is status post right total knee arthroplasty on 1/9/2025, POD # 1. Patient is doing well and is comfortable. The patient's pain is controlled using the prescribed pain medications. The patient has not had physical therapy. Denies nausea, vomiting, chest pain, shortness of breath, abdominal pain or fever. No new complaints.                              10.7   13.68 )-----------( 210      ( 10 Philip 2025 06:52 )             32.4           Vital Signs Last 24 Hrs  T(C): 36.4 (10 Philip 2025 05:28), Max: 36.9 (09 Jan 2025 12:35)  T(F): 97.5 (10 Philip 2025 05:28), Max: 98.4 (09 Jan 2025 12:35)  HR: 90 (10 Philip 2025 05:28) (72 - 100)  BP: 134/78 (10 Philip 2025 05:28) (117/67 - 159/83)  BP(mean): 84 (09 Jan 2025 20:45) (83 - 106)  RR: 18 (10 Philip 2025 05:28) (13 - 18)  SpO2: 98% (10 Philip 2025 05:28) (94% - 99%)    Parameters below as of 10 Philip 2025 05:28  Patient On (Oxygen Delivery Method): room air        Physical exam: The right knee dressing is clean, dry and intact. No drainage or discharge. No erythema is noted. No blistering. No ecchymosis. The calf is supple nontender.  No calf tenderness. Sensation to light touch is grossly intact distally. Motor function distally is 5/5. Extensor hallucis longus and flexor hallucis longus are intact. No foot drop. 2+ dorsalis pedis pulse. Capillary refill is less than 2 seconds. No cyanosis.    Primary Orthopedic Assessment:  • s/p RIGHT total knee replacement pod #1  Plan:   • DVT prophylaxis asoirin 81mg bid including use of compression devices and ankle pumps  • Continue physical therapy  • Weightbearing as tolerated of the right lower extremity with assistance of a walker  • Incentive spirometry encouraged  • Pain control as clinically indicated  • Discharge planning – anticipated discharge is Home when cleared by PT

## 2025-01-10 NOTE — CONSULT NOTE ADULT - NS ATTEND AMEND GEN_ALL_CORE FT
Patient seen and examined , s/p R TKA , POD#1.   Pain well controlled , denies n/v, voiding ,   tolerating diet , participating with physical therapy .     Vital Signs Last 24 Hrs  T(C): 36.4 (10 Philip 2025 05:28), Max: 36.9 (09 Jan 2025 12:35)  T(F): 97.5 (10 Philip 2025 05:28), Max: 98.4 (09 Jan 2025 12:35)  HR: 90 (10 Philip 2025 05:28) (72 - 100)  BP: 134/78 (10 Philip 2025 05:28) (117/67 - 159/83)  BP(mean): 84 (09 Jan 2025 20:45) (83 - 106)  RR: 18 (10 Philip 2025 05:28) (13 - 18)  SpO2: 98% (10 Philip 2025 05:28) (94% - 99%)    O2 Parameters below as of 10 Philip 2025 05:28  Patient On (Oxygen Delivery Method): room air      Obese   Lungs: CTA B/L  CVS: S1S2 , murmur 2/6 + , no rubs   R knee with dry and clean dressing   Above noted and reviewed with NP     Hx of AISHA- may use own CPAP   Mild elevation of LFT's on  pre op labs noted , patient asymptomatic - made aware , f/u with PCP   EBL - 50 ml - minimal - patient is asymptomatic , stable vitals    Post op leucocytosis - no S/S of infection - likely reactive .     Thank you for the courtesy of thi consult , will follow along with you . Patient seen and examined , s/p R TKA , POD#1.   Pain well controlled , denies n/v, voiding ,   tolerating diet , participating with physical therapy .     Vital Signs Last 24 Hrs  T(C): 36.4 (10 Philip 2025 05:28), Max: 36.9 (09 Jan 2025 12:35)  T(F): 97.5 (10 Philip 2025 05:28), Max: 98.4 (09 Jan 2025 12:35)  HR: 90 (10 Philip 2025 05:28) (72 - 100)  BP: 134/78 (10 Philip 2025 05:28) (117/67 - 159/83)  BP(mean): 84 (09 Jan 2025 20:45) (83 - 106)  RR: 18 (10 Philip 2025 05:28) (13 - 18)  SpO2: 98% (10 Philip 2025 05:28) (94% - 99%)    O2 Parameters below as of 10 Philip 2025 05:28  Patient On (Oxygen Delivery Method): room air      Obese   Lungs: CTA B/L  CVS: S1S2 , murmur 2/6 + , no rubs   R knee with dry and clean dressing   Above noted and reviewed with NP     A1C with Estimated Average Glucose (12.30.24 @ 12:55)    A1C with Estimated Average Glucose Result: 7.8 %    Estimated Average Glucose: 177 mg/dL    Prior A1C in 2023 - 6.4 - patient with pre DM - now with new onset DM - was on Ozempic for weight loss and d/jeanine due to side effects   Noted with hyperglycemia   CAPILLARY BLOOD GLUCOSE  POCT Blood Glucose.: 220 mg/dL (10 Philip 2025 08:06)  POCT Blood Glucose.: 224 mg/dL (09 Jan 2025 22:07)  POCT Blood Glucose.: 132 mg/dL (09 Jan 2025 17:43)  POCT Blood Glucose.: 121 mg/dL (09 Jan 2025 15:33)  POCT Blood Glucose.: 123 mg/dL (09 Jan 2025 12:57)    Patient made aware , will reach out to his PCP and consider to start patient on Metformin for better sugar control , ADA diet .   F/U with PCP in 1-2 wks for further recommendations     Hx of AISHA- may use own CPAP   Mild elevation of LFT's on  pre op labs noted , patient asymptomatic - made aware , f/u with PCP   EBL - 50 ml - minimal - patient is asymptomatic , stable vitals    Post op leucocytosis - no S/S of infection - likely reactive .     Thank you for the courtesy of thi consult , will follow along with you .

## 2025-01-10 NOTE — CONSULT NOTE ADULT - ASSESSMENT
60 y/o male with  OA right knee.  States he has had the issue in his right knee for over the past year, states pain started to worsen in the last year. States pain is worse since getting his left knee replaced 11/2023 and starting to move more and now it bothers him severely. States pain is constant when standing or walking, no pain with sitting, rated 7/10, described as a stabbing pain, denies radiation of pain, states he got temporary relief from knee injections, denies recent falls, denies numbness in leg, denies cane/walker. History of HTN.  States post op 3 days after left knee replacement 11/2023 he lost vision in his right eye and developed NAION ( ischemic optic neuropathy).  History of AISHA on cpap and Demetris's esophagus. S/P  right knee total joint replacement on 1/9/25 with Dr. Valente Prater secondary to unilateral primary OA right knee.

## 2025-01-10 NOTE — CONSULT NOTE ADULT - PROBLEM SELECTOR RECOMMENDATION 3
Dietary/Activity modifications recommended BMI - 58 - CLASS 3 obesity   Dietary/Activity modifications recommended,   further outpatient weight loss option to be d/w PCP

## 2025-01-10 NOTE — DISCHARGE NOTE NURSING/CASE MANAGEMENT/SOCIAL WORK - FINANCIAL ASSISTANCE
[facility] provides services at a reduced cost to those who are determined to be eligible through [facility]’s financial assistance program. Information regarding [facility]’s financial assistance program can be found by going to [email or web address] or by calling [phone].

## 2025-01-10 NOTE — DISCHARGE NOTE NURSING/CASE MANAGEMENT/SOCIAL WORK - PATIENT PORTAL LINK FT
You can access the FollowMyHealth Patient Portal offered by [facility] by registering at the following website: [email or web address]. By joining [facility]’s FollowMyHealth portal, you will also be able to view your health information using other applications (apps) compatible with our system.

## 2025-01-10 NOTE — CONSULT NOTE ADULT - SUBJECTIVE AND OBJECTIVE BOX
HPI:  58 y/o male with  OA right knee.  States he has had the issue in his right knee for over the past year, states pain started to worsen in the last year. States pain is worse since getting his left knee replaced 11/2023 and starting to move more and now it bothers him severely. States pain is constant when standing or walking, no pain with sitting, rated 7/10, described as a stabbing pain, denies radiation of pain, states he got temporary relief from knee injections, denies recent falls, denies numbness in leg, denies cane/walker. History of HTN.  States post op 3 days after left knee replacement 11/2023 he lost vision in his right eye and developed NAION ( ischemic optic neuropathy).  History of AISHA on cpap and Demetris's esophagus. S/P  right knee total joint replacement on 1/9/25 with Dr. Valente Prater secondary to unilateral primary OA right knee.       PAST MEDICAL & SURGICAL HISTORY:  Gout      HTN (hypertension)      Obstructive sleep apnea      Lamas esophagus      Non-arteritic anterior ischemic optic neuropathy      Morbid obesity      Unilateral primary osteoarthritis, right knee      Heart murmur      H/O arthroscopy of knee      H/O total knee replacement, left    Home Medications:   · 	Benicar 40 mg oral tablet  1 tab(s) orally once a day      MEDICATIONS  (STANDING):  acetaminophen     Tablet .. 975 milliGRAM(s) Oral every 8 hours  aspirin enteric coated 81 milliGRAM(s) Oral two times a day  influenza   Vaccine 0.5 milliLiter(s) IntraMuscular once  ketorolac   Injectable 15 milliGRAM(s) IV Push every 6 hours  multivitamin 1 Tablet(s) Oral daily  pantoprazole    Tablet 40 milliGRAM(s) Oral before breakfast  polyethylene glycol 3350 17 Gram(s) Oral at bedtime  senna 2 Tablet(s) Oral at bedtime  sodium chloride 0.9% lock flush 3 milliLiter(s) IV Push every 8 hours  sodium chloride 0.9%. 1000 milliLiter(s) (125 mL/Hr) IV Continuous <Continuous>    MEDICATIONS  (PRN):  HYDROmorphone   Tablet 4 milliGRAM(s) Oral every 3 hours PRN Severe Pain (7 - 10)  magnesium hydroxide Suspension 30 milliLiter(s) Oral daily PRN Constipation  ondansetron Injectable 4 milliGRAM(s) IV Push every 6 hours PRN Nausea and/or Vomiting  oxyCODONE    IR 5 milliGRAM(s) Oral every 3 hours PRN Mild Pain (1 - 3)  oxyCODONE    IR 10 milliGRAM(s) Oral every 3 hours PRN Moderate Pain (4 - 6)      Allergies    penicillin (Rash)  shellfish (Anaphylaxis)    Intolerances        SOCIAL HISTORY:  Never a smoker  Denies ETOH/IVDA    FAMILY HISTORY:  FH: multiple sclerosis (Sibling)        Vital Signs Last 24 Hrs  T(C): 36.4 (10 Philip 2025 05:28), Max: 36.9 (09 Jan 2025 12:35)  T(F): 97.5 (10 Philip 2025 05:28), Max: 98.4 (09 Jan 2025 12:35)  HR: 90 (10 Philip 2025 05:28) (72 - 100)  BP: 134/78 (10 Philip 2025 05:28) (117/67 - 159/83)  BP(mean): 84 (09 Jan 2025 20:45) (83 - 106)  RR: 18 (10 Philip 2025 05:28) (13 - 18)  SpO2: 98% (10 Philip 2025 05:28) (94% - 99%)    Parameters below as of 10 Philip 2025 05:28  Patient On (Oxygen Delivery Method): room air        LABS:                        10.7   13.68 )-----------( 210      ( 10 Philip 2025 06:52 )             32.4                   RADIOLOGY & ADDITIONAL STUDIES: HPI:  58 y/o male with  OA right knee.  States he has had the issue in his right knee for over the past year, states pain started to worsen in the last year. States pain is worse since getting his left knee replaced 11/2023 and starting to move more and now it bothers him severely. States pain is constant when standing or walking, no pain with sitting, rated 7/10, described as a stabbing pain, denies radiation of pain, states he got temporary relief from knee injections, denies recent falls, denies numbness in leg, denies cane/walker. History of HTN.  States post op 3 days after left knee replacement 11/2023 he lost vision in his right eye and developed NAION ( ischemic optic neuropathy).  History of AISHA on cpap and Demetris's esophagus. S/P  right knee total joint replacement on 1/9/25 with Dr. Valente Prater secondary to unilateral primary OA right knee. Seen and examined POD#1. No acute issues overnight. Worked with PT. Pain controlled on current RX. Anticipates going home today.       PAST MEDICAL & SURGICAL HISTORY:  Gout      HTN (hypertension)      Obstructive sleep apnea      Lamas esophagus      Non-arteritic anterior ischemic optic neuropathy      Morbid obesity      Unilateral primary osteoarthritis, right knee      Heart murmur      H/O arthroscopy of knee      H/O total knee replacement, left    Home Medications:   · 	Benicar 40 mg oral tablet  1 tab(s) orally once a day      MEDICATIONS  (STANDING):  acetaminophen     Tablet .. 975 milliGRAM(s) Oral every 8 hours  aspirin enteric coated 81 milliGRAM(s) Oral two times a day  influenza   Vaccine 0.5 milliLiter(s) IntraMuscular once  ketorolac   Injectable 15 milliGRAM(s) IV Push every 6 hours  multivitamin 1 Tablet(s) Oral daily  pantoprazole    Tablet 40 milliGRAM(s) Oral before breakfast  polyethylene glycol 3350 17 Gram(s) Oral at bedtime  senna 2 Tablet(s) Oral at bedtime  sodium chloride 0.9% lock flush 3 milliLiter(s) IV Push every 8 hours  sodium chloride 0.9%. 1000 milliLiter(s) (125 mL/Hr) IV Continuous <Continuous>    MEDICATIONS  (PRN):  HYDROmorphone   Tablet 4 milliGRAM(s) Oral every 3 hours PRN Severe Pain (7 - 10)  magnesium hydroxide Suspension 30 milliLiter(s) Oral daily PRN Constipation  ondansetron Injectable 4 milliGRAM(s) IV Push every 6 hours PRN Nausea and/or Vomiting  oxyCODONE    IR 5 milliGRAM(s) Oral every 3 hours PRN Mild Pain (1 - 3)  oxyCODONE    IR 10 milliGRAM(s) Oral every 3 hours PRN Moderate Pain (4 - 6)      Allergies    penicillin (Rash)  shellfish (Anaphylaxis)    Intolerances        SOCIAL HISTORY:  Never a smoker  Denies ETOH/IVDA    FAMILY HISTORY:  FH: multiple sclerosis (Sibling)    ROS:  Constitutional, Eyes, ENT, Cardiovascular, Respiratory, Gastrointestinal, Genitourinary, Musculoskeletal, Integumentary, Neurological, Psychiatric, Endocrine, Heme/Lymph, and Allergic/Immunologic review of systems are otherwise negative except as noted in the HPI      Vital Signs Last 24 Hrs  T(C): 36.4 (10 Philip 2025 05:28), Max: 36.9 (09 Jan 2025 12:35)  T(F): 97.5 (10 Philip 2025 05:28), Max: 98.4 (09 Jan 2025 12:35)  HR: 90 (10 Philip 2025 05:28) (72 - 100)  BP: 134/78 (10 Philip 2025 05:28) (117/67 - 159/83)  BP(mean): 84 (09 Jan 2025 20:45) (83 - 106)  RR: 18 (10 Philip 2025 05:28) (13 - 18)  SpO2: 98% (10 Philip 2025 05:28) (94% - 99%)    Parameters below as of 10 Philip 2025 05:28  Patient On (Oxygen Delivery Method): room air    PHYSICAL EXAM:    General: Well developed ; in no acute distress  Eyes: PERRLA, EOMI; conjunctiva and sclera clear  Head: Normocephalic; atraumatic  ENMT: No nasal discharge; airway clear  Neck: Supple; non tender; no JVD  Respiratory: No wheezes, rales or rhonchi  Cardiovascular: Regular rate and rhythm. S1 and S2 Normal; No murmurs, gallops or rubs  Gastrointestinal: Soft non-tender non-distended; Normal bowel sounds  Extremities: Right knee dressing C/D/I. No clubbing, cyanosis or edema  Vascular: Peripheral pulses palpable 2+ bilaterally  Neurological: Alert and oriented x4  Skin: Warm and dry. No acute rash  Psychiatric: Cooperative and appropriate        LABS:                        10.7   13.68 )-----------( 210      ( 10 Philip 2025 06:52 )             32.4                   RADIOLOGY & ADDITIONAL STUDIES: HPI:  60 y/o male with  OA right knee.  States he has had the issue in his right knee for over the past year, states pain started to worsen in the last year. States pain is worse since getting his left knee replaced 11/2023 and starting to move more and now it bothers him severely. States pain is constant when standing or walking, no pain with sitting, rated 7/10, described as a stabbing pain, denies radiation of pain, states he got temporary relief from knee injections, denies recent falls, denies numbness in leg, denies cane/walker. History of HTN.  States post op 3 days after left knee replacement 11/2023 he lost vision in his right eye and developed NAION ( ischemic optic neuropathy).  History of AISHA on cpap and Demetris's esophagus. S/P  right knee total joint replacement on 1/9/25 with Dr. Valente Prater secondary to unilateral primary OA right knee. Seen and examined POD#1. No acute issues overnight. Worked with PT. Pain controlled on current RX. Anticipates going home today.       PAST MEDICAL & SURGICAL HISTORY:  Gout      HTN (hypertension)      Obstructive sleep apnea      Lamas esophagus      Non-arteritic anterior ischemic optic neuropathy      Morbid obesity      Unilateral primary osteoarthritis, right knee      Heart murmur      H/O arthroscopy of knee      H/O total knee replacement, left    Home Medications:   · 	Benicar 40 mg oral tablet  1 tab(s) orally once a day      MEDICATIONS  (STANDING):  acetaminophen     Tablet .. 975 milliGRAM(s) Oral every 8 hours  aspirin enteric coated 81 milliGRAM(s) Oral two times a day  influenza   Vaccine 0.5 milliLiter(s) IntraMuscular once  ketorolac   Injectable 15 milliGRAM(s) IV Push every 6 hours  multivitamin 1 Tablet(s) Oral daily  pantoprazole    Tablet 40 milliGRAM(s) Oral before breakfast  polyethylene glycol 3350 17 Gram(s) Oral at bedtime  senna 2 Tablet(s) Oral at bedtime  sodium chloride 0.9% lock flush 3 milliLiter(s) IV Push every 8 hours  sodium chloride 0.9%. 1000 milliLiter(s) (125 mL/Hr) IV Continuous <Continuous>    MEDICATIONS  (PRN):  HYDROmorphone   Tablet 4 milliGRAM(s) Oral every 3 hours PRN Severe Pain (7 - 10)  magnesium hydroxide Suspension 30 milliLiter(s) Oral daily PRN Constipation  ondansetron Injectable 4 milliGRAM(s) IV Push every 6 hours PRN Nausea and/or Vomiting  oxyCODONE    IR 5 milliGRAM(s) Oral every 3 hours PRN Mild Pain (1 - 3)  oxyCODONE    IR 10 milliGRAM(s) Oral every 3 hours PRN Moderate Pain (4 - 6)      Allergies    penicillin (Rash)  shellfish (Anaphylaxis)    Intolerances        SOCIAL HISTORY:  Never a smoker  Denies ETOH/IVDA    FAMILY HISTORY:  FH: multiple sclerosis (Sibling)    ROS:  Constitutional, Eyes, ENT, Cardiovascular, Respiratory, Gastrointestinal, Genitourinary, Musculoskeletal, Integumentary, Neurological, Psychiatric, Endocrine, Heme/Lymph, and Allergic/Immunologic review of systems are otherwise negative except as noted in the HPI      Vital Signs Last 24 Hrs  T(C): 36.4 (10 Philip 2025 05:28), Max: 36.9 (09 Jan 2025 12:35)  T(F): 97.5 (10 Philip 2025 05:28), Max: 98.4 (09 Jan 2025 12:35)  HR: 90 (10 Philip 2025 05:28) (72 - 100)  BP: 134/78 (10 Philip 2025 05:28) (117/67 - 159/83)  BP(mean): 84 (09 Jan 2025 20:45) (83 - 106)  RR: 18 (10 Philip 2025 05:28) (13 - 18)  SpO2: 98% (10 Philip 2025 05:28) (94% - 99%)    Parameters below as of 10 Philip 2025 05:28  Patient On (Oxygen Delivery Method): room air    PHYSICAL EXAM:    General: Well developed ; in no acute distress  Eyes: PERRLA, EOMI; conjunctiva and sclera clear  Head: Normocephalic; atraumatic  ENMT: No nasal discharge; airway clear  Neck: Supple; non tender; no JVD  Respiratory: No wheezes, rales or rhonchi  Cardiovascular: Regular rate and rhythm. S1 and S2 Normal; No murmurs, gallops or rubs  Gastrointestinal: Soft non-tender non-distended; Normal bowel sounds  Extremities: Right knee dressing C/D/I. No clubbing, cyanosis or edema  Vascular: Peripheral pulses palpable 2+ bilaterally  Neurological: Alert and oriented x4  Skin: Warm and dry. No acute rash  Psychiatric: Cooperative and appropriate        LABS:                        10.7   13.68 )-----------( 210      ( 10 Philip 2025 06:52 )             32.4         RADIOLOGY & ADDITIONAL STUDIES:

## 2025-01-10 NOTE — PATIENT PROFILE ADULT - FALL HARM RISK - HARM RISK INTERVENTIONS
Assistance with ambulation/Assistance OOB with selected safe patient handling equipment/Communicate Risk of Fall with Harm to all staff/Discuss with provider need for PT consult/Monitor gait and stability/Provide patient with walking aids - walker, cane, crutches/Reinforce activity limits and safety measures with patient and family/Sit up slowly, dangle for a short time, stand at bedside before walking/Tailored Fall Risk Interventions/Use of alarms - bed, chair and/or voice tab/Visual Cue: Yellow wristband and red socks/Bed in lowest position, wheels locked, appropriate side rails in place/Call bell, personal items and telephone in reach/Instruct patient to call for assistance before getting out of bed or chair/Non-slip footwear when patient is out of bed/[place] to call system/Physically safe environment - no spills, clutter or unnecessary equipment/Purposeful Proactive Rounding/Room/bathroom lighting operational, light cord in reach

## 2025-01-10 NOTE — DISCHARGE NOTE NURSING/CASE MANAGEMENT/SOCIAL WORK - NSDCPEFALRISK_GEN_ALL_CORE
For information on Fall & Injury Prevention, visit: [email or web address] OR  [email or web address] OR  https://www.cdc.gov/steadi/patient.html

## 2025-01-11 RX ORDER — CELECOXIB 200 MG
1 CAPSULE ORAL
Qty: 42 | Refills: 0
Start: 2025-01-11

## 2025-01-28 ENCOUNTER — NON-APPOINTMENT (OUTPATIENT)
Age: 60
End: 2025-01-28

## 2025-01-30 ENCOUNTER — APPOINTMENT (OUTPATIENT)
Dept: ORTHOPEDIC SURGERY | Facility: CLINIC | Age: 60
End: 2025-01-30
Payer: MEDICARE

## 2025-01-30 DIAGNOSIS — Z47.1 AFTERCARE FOLLOWING JOINT REPLACEMENT SURGERY: ICD-10-CM

## 2025-01-30 DIAGNOSIS — Z96.651 AFTERCARE FOLLOWING JOINT REPLACEMENT SURGERY: ICD-10-CM

## 2025-01-30 PROCEDURE — 73562 X-RAY EXAM OF KNEE 3: CPT | Mod: RT

## 2025-01-30 PROCEDURE — 99024 POSTOP FOLLOW-UP VISIT: CPT

## 2025-02-13 ENCOUNTER — APPOINTMENT (OUTPATIENT)
Dept: ORTHOPEDIC SURGERY | Facility: CLINIC | Age: 60
End: 2025-02-13
Payer: MEDICARE

## 2025-02-13 PROCEDURE — 99024 POSTOP FOLLOW-UP VISIT: CPT

## 2025-02-19 ENCOUNTER — APPOINTMENT (OUTPATIENT)
Age: 60
End: 2025-02-19
Payer: MEDICARE

## 2025-02-19 VITALS — WEIGHT: 315 LBS | BODY MASS INDEX: 41.75 KG/M2 | HEIGHT: 73 IN

## 2025-02-19 DIAGNOSIS — Z96.651 PRESENCE OF RIGHT ARTIFICIAL KNEE JOINT: ICD-10-CM

## 2025-02-19 DIAGNOSIS — Z47.1 AFTERCARE FOLLOWING JOINT REPLACEMENT SURGERY: ICD-10-CM

## 2025-02-19 DIAGNOSIS — Z96.651 AFTERCARE FOLLOWING JOINT REPLACEMENT SURGERY: ICD-10-CM

## 2025-02-19 DIAGNOSIS — T81.41XA INFECTION FOLLOWING A PROCEDURE,SUPERFICIAL INCISIONAL SURGI SITE,INITIAL ENC: ICD-10-CM

## 2025-02-19 PROCEDURE — 99024 POSTOP FOLLOW-UP VISIT: CPT

## 2025-02-20 ENCOUNTER — NON-APPOINTMENT (OUTPATIENT)
Age: 60
End: 2025-02-20

## 2025-03-24 ENCOUNTER — APPOINTMENT (OUTPATIENT)
Dept: ORTHOPEDIC SURGERY | Facility: CLINIC | Age: 60
End: 2025-03-24
Payer: MEDICARE

## 2025-03-24 DIAGNOSIS — Z96.652 AFTERCARE FOLLOWING JOINT REPLACEMENT SURGERY: ICD-10-CM

## 2025-03-24 DIAGNOSIS — Z47.1 AFTERCARE FOLLOWING JOINT REPLACEMENT SURGERY: ICD-10-CM

## 2025-03-24 DIAGNOSIS — Z96.652 PRESENCE OF LEFT ARTIFICIAL KNEE JOINT: ICD-10-CM

## 2025-03-24 PROCEDURE — 99024 POSTOP FOLLOW-UP VISIT: CPT

## 2025-03-24 PROCEDURE — 73562 X-RAY EXAM OF KNEE 3: CPT | Mod: RT

## 2025-03-24 RX ORDER — CLINDAMYCIN HYDROCHLORIDE 300 MG/1
300 CAPSULE ORAL
Qty: 2 | Refills: 5 | Status: ACTIVE | COMMUNITY
Start: 2025-03-24 | End: 1900-01-01

## 2025-04-01 ENCOUNTER — APPOINTMENT (OUTPATIENT)
Dept: ORTHOPEDIC SURGERY | Facility: CLINIC | Age: 60
End: 2025-04-01
Payer: MEDICARE

## 2025-04-01 VITALS — HEIGHT: 73 IN | BODY MASS INDEX: 41.75 KG/M2 | WEIGHT: 315 LBS

## 2025-04-01 DIAGNOSIS — Z96.651 AFTERCARE FOLLOWING JOINT REPLACEMENT SURGERY: ICD-10-CM

## 2025-04-01 DIAGNOSIS — Z96.651 PRESENCE OF RIGHT ARTIFICIAL KNEE JOINT: ICD-10-CM

## 2025-04-01 DIAGNOSIS — Z47.1 AFTERCARE FOLLOWING JOINT REPLACEMENT SURGERY: ICD-10-CM

## 2025-04-01 PROCEDURE — 99024 POSTOP FOLLOW-UP VISIT: CPT

## 2025-04-01 RX ORDER — DICLOFENAC SODIUM AND MISOPROSTOL 75; 200 MG/1; UG/1
75-0.2 TABLET, DELAYED RELEASE ORAL
Qty: 28 | Refills: 0 | Status: ACTIVE | COMMUNITY
Start: 2025-04-01 | End: 1900-01-01

## 2025-04-29 NOTE — PATIENT PROFILE ADULT - NSPRESCRALCAMT_GEN_A_NUR
Patient Education     Well Child Exam 12 Months   About this topic   Your child's 12-month well child exam is a visit with the doctor to check your child's health. The doctor measures your child's weight, height, and head size. The doctor plots these numbers on a growth curve. The growth curve gives a picture of your child's growth at each visit. The doctor may listen to your child's heart, lungs, and belly. Your doctor will do a full exam of your child from the head to the toes.  Your child may also need shots or blood tests during this visit.  General   Growth and Development   Your doctor will ask you how your child is developing. The doctor will focus on the skills that most children your child's age are expected to do. During this time of your child's life, here are some things you can expect.  Movement - Your child may:  Stand and walk holding on to something  Begin to walk without help  Use finger and thumb to  small objects  Point to objects  Wave bye-bye  Hearing, seeing, and talking - Your child will likely:  Say Mama or Simone  Have 1 or 2 other words  Begin to understand “no”. Try to distract or redirect to correct your child.  Be able to follow simple commands  Imitate your gestures  Be more comfortable with familiar people and toys. Be prepared for tears when saying good bye. Say I love you and then leave. Your child may be upset, but will calm down in a little bit.  Feeding - Your child:  Can start to drink whole milk instead of formula or breastmilk. Limit milk to 24 ounces per day and juice to 4 ounces per day.  Is ready to give up the bottle and drink from a cup or sippy cup  Will be eating 3 meals and 2 to 3 snacks a day. However, your child may eat less than before, and this is normal.  May be ready to start eating table foods that are soft, mashed, or pureed.  Don't force your child to eat foods. You may have to offer a food more than 10 times before your child will like it.  Give your  child small bites of soft finger foods like bananas or well cooked vegetables.  Watch for signs your child is full, like turning the head or leaning back.  Should be allowed to eat without help. Mealtime will be messy.  Should have small pieces of fruit instead fruit juice.  Will need you to clean the teeth after a feeding with a wet washcloth or a wet child's toothbrush. You may use a smear of toothpaste with fluoride in it 2 times each day.  Sleep - Your child:  Should still sleep in a safe crib, on the back, alone for naps and at night. Keep soft bedding, bumpers, and toys out of your child's bed. It is OK if your child rolls over without help at night.  Is likely sleeping about 10 to 12 hours in a row at night  Needs 1 to 2 naps each day  Sleeps about a total of 14 hours each day  Should be able to fall asleep without help. If your child wakes up at night, check on your child. Do not pick your child up, offer a bottle, or play with your child. Doing these things will not help your child fall asleep without help.  Should not have a bottle in bed. This can cause tooth decay or ear infections. Give a bottle before putting your child in the crib for the night.  Vaccines - It is important for your child to get shots on time. This protects from very serious illnesses like lung infections, meningitis, or infections that harm the nervous system. Your baby may also need a flu shot. Check with your doctor to make sure your baby's shots are up to date. Your child may need:  DTaP or diphtheria, tetanus, and pertussis vaccine  Hib or Haemophilus influenzae type b vaccine  PCV or pneumococcal conjugate vaccine  MMR or measles, mumps, and rubella vaccine  Varicella or chickenpox vaccine  Hep A or hepatitis A vaccine  Flu or Influenza vaccine  Your child may get some of these combined into one shot. This lowers the number of shots your child may get and yet keeps them protected.  Help for Parents   Play with your child.  Give  your child soft balls, blocks, and containers to play with. Toys that can be stacked or nest inside of one another are also good.  Cars, trains, and toys to push, pull, or walk behind are fun. So are puzzles and animal or people figures.  Read to your child. Name the things in the pictures in the book. Talk and sing to your child. This helps your child learn language skills.  Here are some things you can do to help keep your child safe and healthy.  Do not allow anyone to smoke in your home or around your child.  Have the right size car seat for your child and use it every time your child is in the car. Your child should be rear facing until at least 2 years of age or older.  Be sure furniture, shelves, and televisions are secure and cannot tip over onto your child.  Take extra care around water. Close bathroom doors. Never leave your child in the tub alone.  Never leave your child alone. Do not leave your child in the car, in the bath, or at home alone, even for a few minutes.  Avoid long exposure to direct sunlight by keeping your child in the shade. Use sunscreen if shade is not possible.  Protect your child from gun injuries. If you have a gun, use a trigger lock. Keep the gun locked up and the bullets kept in a separate place.  Avoid screen time for children under 2 years old. This means no TV, computers, or video games. They can cause problems with brain development.  Parents need to think about:  Having emergency numbers, including poison control, in your phone or posted near the phone  How to distract your child when doing something you don’t want your child to do  Using positive words to tell your child what you want, rather than saying no or what not to do  Your next well child visit will most likely be when your child is 15 months old. At this visit your doctor may:  Do a full check up on your child  Talk about making sure your home is safe for your child, how well your child is eating, and how to correct  your child  Give your child the next set of shots  When do I need to call the doctor?   Fever of 100.4°F (38°C) or higher  Sleeps all the time or has trouble sleeping  Won't stop crying  You are worried about your child's development  Last Reviewed Date   2021-09-17  Consumer Information Use and Disclaimer   This generalized information is a limited summary of diagnosis, treatment, and/or medication information. It is not meant to be comprehensive and should be used as a tool to help the user understand and/or assess potential diagnostic and treatment options. It does NOT include all information about conditions, treatments, medications, side effects, or risks that may apply to a specific patient. It is not intended to be medical advice or a substitute for the medical advice, diagnosis, or treatment of a health care provider based on the health care provider's examination and assessment of a patient’s specific and unique circumstances. Patients must speak with a health care provider for complete information about their health, medical questions, and treatment options, including any risks or benefits regarding use of medications. This information does not endorse any treatments or medications as safe, effective, or approved for treating a specific patient. [employer] and its affiliates disclaim any warranty or liability relating to this information or the use thereof. The use of this information is governed by the Terms of Use, available at https://www.[employer].com/en/know/clinical-effectiveness-terms   Copyright   Copyright © 2024 [employer] and its affiliates and/or licensors. All rights reserved.     1 or 2

## 2025-07-08 ENCOUNTER — APPOINTMENT (OUTPATIENT)
Dept: ORTHOPEDIC SURGERY | Facility: CLINIC | Age: 60
End: 2025-07-08
Payer: MEDICARE

## 2025-07-08 VITALS
HEIGHT: 73 IN | BODY MASS INDEX: 41.75 KG/M2 | DIASTOLIC BLOOD PRESSURE: 84 MMHG | SYSTOLIC BLOOD PRESSURE: 135 MMHG | WEIGHT: 315 LBS

## 2025-07-08 PROBLEM — M25.512 ACUTE PAIN OF LEFT SHOULDER: Status: ACTIVE | Noted: 2025-07-08

## 2025-07-08 PROCEDURE — 73030 X-RAY EXAM OF SHOULDER: CPT | Mod: LT

## 2025-07-08 PROCEDURE — 99213 OFFICE O/P EST LOW 20 MIN: CPT

## 2025-07-08 RX ORDER — OMEPRAZOLE 20 MG/1
20 CAPSULE, DELAYED RELEASE ORAL DAILY
Qty: 30 | Refills: 0 | Status: ACTIVE | COMMUNITY
Start: 2025-07-08 | End: 1900-01-01

## 2025-07-08 RX ORDER — DICLOFENAC SODIUM 75 MG/1
75 TABLET, DELAYED RELEASE ORAL
Qty: 60 | Refills: 0 | Status: ACTIVE | COMMUNITY
Start: 2025-07-08 | End: 1900-01-01

## 2025-07-29 ENCOUNTER — NON-APPOINTMENT (OUTPATIENT)
Age: 60
End: 2025-07-29

## (undated) DEVICE — ZIMMER PULSAVAC PLUS FAN KIT

## (undated) DEVICE — DRAPE 3/4 SHEET 52X76"

## (undated) DEVICE — TAPE SILK 3"

## (undated) DEVICE — NDL HYPO SAFE 20G X 1.5" (YELLOW)

## (undated) DEVICE — WOUND IRR SURGIPHOR

## (undated) DEVICE — PACK TOTAL KNEE

## (undated) DEVICE — ELCTR STRYKER NEPTUNE SMOKE EVACUATION PENCIL (GREEN)

## (undated) DEVICE — DRAPE U LONG 47X70"

## (undated) DEVICE — DRAPE XL SHEET 77X98"

## (undated) DEVICE — GLV 8 PROTEXIS ORTHO (BROWN)

## (undated) DEVICE — SUT MONOCRYL 3-0 27" PS-2 UNDYED

## (undated) DEVICE — DRSG MEPILEX 10 X 25CM (4 X 10") POST OP AG SILVER

## (undated) DEVICE — GLV 8 PROTEXIS (BLUE)

## (undated) DEVICE — DRAPE 1/2 SHEET 40X57"

## (undated) DEVICE — SAW BLADE ZIMMER RECIPROCATING DOUBLE SIDED 12.5X96X1.19MM

## (undated) DEVICE — VENODYNE/SCD SLEEVE CALF MEDIUM

## (undated) DEVICE — ORTHOALIGN PLUS UNIT

## (undated) DEVICE — HOOD FLYTE STRYKER SURGICOOL W PEELAWAY

## (undated) DEVICE — SOL IRR BAG NS 0.9% 3000ML

## (undated) DEVICE — SOL IRR POUR NS 0.9% 1000ML

## (undated) DEVICE — SUT VICRYL 2-0 27" CT-2 UNDYED

## (undated) DEVICE — DRAPE U LONG (CLEAR) 47 X 70"

## (undated) DEVICE — ZIMMER BLADE PATELLA REAMER SIZE 29

## (undated) DEVICE — WARMING BLANKET UPPER ADULT

## (undated) DEVICE — SAW BLADE STRYKER SAGITTAL DUAL CUT 75X18X1.27MM

## (undated) DEVICE — SYR LUER LOK 50CC

## (undated) DEVICE — ZIMMER BLADE PATELLA REAMER W PILOT HOLE SZ 32

## (undated) DEVICE — SOL IRR POUR H2O 1000ML

## (undated) DEVICE — SUT VICRYL PLUS 0 18" CT-1 UNDYED (POP-OFF)

## (undated) DEVICE — DRSG DERMABOND PRINEO 60CM

## (undated) DEVICE — ZIMMER MIXING BOWL

## (undated) DEVICE — SUT VICRYL 0 18" CT-1 UNDYED (POP-OFF)

## (undated) DEVICE — SAW BLADE STRYKER OSCILLATING 18.5MMX1.24MMX104.0MM